# Patient Record
Sex: MALE | Race: WHITE | Employment: UNEMPLOYED | URBAN - METROPOLITAN AREA
[De-identification: names, ages, dates, MRNs, and addresses within clinical notes are randomized per-mention and may not be internally consistent; named-entity substitution may affect disease eponyms.]

---

## 2017-01-11 DIAGNOSIS — Z94.0 S/P KIDNEY TRANSPLANT: Primary | ICD-10-CM

## 2017-01-11 DIAGNOSIS — Z94.83 PANCREAS REPLACED BY TRANSPLANT (H): ICD-10-CM

## 2017-01-11 DIAGNOSIS — Z94.0 KIDNEY REPLACED BY TRANSPLANT: Primary | ICD-10-CM

## 2017-01-11 RX ORDER — SODIUM BICARBONATE 650 MG/1
TABLET ORAL
Qty: 90 TABLET | Refills: 0 | Status: SHIPPED | OUTPATIENT
Start: 2017-01-11 | End: 2017-01-24

## 2017-01-11 RX ORDER — TACROLIMUS 0.5 MG/1
CAPSULE, GELATIN COATED ORAL
Qty: 60 CAPSULE | Refills: 0 | Status: SHIPPED | OUTPATIENT
Start: 2017-01-11 | End: 2017-01-16

## 2017-01-11 RX ORDER — MYCOPHENOLATE MOFETIL 500 MG/1
TABLET, FILM COATED ORAL
Qty: 60 TABLET | Refills: 0 | Status: SHIPPED | OUTPATIENT
Start: 2017-01-11 | End: 2017-01-16

## 2017-01-11 RX ORDER — TACROLIMUS 1 MG/1
1 CAPSULE, GELATIN COATED ORAL 2 TIMES DAILY
Qty: 60 CAPSULE | Refills: 0 | Status: SHIPPED | OUTPATIENT
Start: 2017-01-11 | End: 2017-01-16

## 2017-01-11 NOTE — Clinical Note
January 11, 2017        Dear Jose Alfredo Tomlinson,      This letter is regarding your medication refills.    For the best outcome for your transplant and in order for us to refill your prescriptions, we encourage you to have a yearly clinic appointment with a physician and to have current labs. If you are unable to return to our transplant center there are several alternatives. Please call your coordinator to discuss them. .  To make an appointment with a physician here at [facility], please call:  The Transplant Office at [phone] or [phone].  .      The Transplant Staff at [facility]

## 2017-01-12 NOTE — TELEPHONE ENCOUNTER
aspirin 81 MG      Last Written Prescription Date:  12/23/15  Last Fill Quantity: 100,   # refills: 3  Last Office Visit : 7/26/16  Future Office visit:  none

## 2017-01-16 ENCOUNTER — TELEPHONE (OUTPATIENT)
Dept: TRANSPLANT | Facility: CLINIC | Age: 57
End: 2017-01-16

## 2017-01-16 DIAGNOSIS — Z94.83 PANCREAS REPLACED BY TRANSPLANT (H): ICD-10-CM

## 2017-01-16 DIAGNOSIS — Z94.0 KIDNEY REPLACED BY TRANSPLANT: Primary | ICD-10-CM

## 2017-01-16 RX ORDER — TACROLIMUS 0.5 MG/1
0.5 CAPSULE, GELATIN COATED ORAL 2 TIMES DAILY
Qty: 60 CAPSULE | Refills: 0 | Status: SHIPPED | OUTPATIENT
Start: 2017-01-16 | End: 2017-01-24

## 2017-01-16 RX ORDER — SULFAMETHOXAZOLE AND TRIMETHOPRIM 400; 80 MG/1; MG/1
1 TABLET ORAL
Qty: 10 TABLET | Refills: 0 | Status: SHIPPED | OUTPATIENT
Start: 2017-01-16 | End: 2017-01-24

## 2017-01-16 RX ORDER — MYCOPHENOLATE MOFETIL 500 MG/1
500 TABLET, FILM COATED ORAL 2 TIMES DAILY
Qty: 60 TABLET | Refills: 0 | Status: SHIPPED | OUTPATIENT
Start: 2017-01-16 | End: 2017-01-24

## 2017-01-16 RX ORDER — TACROLIMUS 1 MG/1
1 CAPSULE, GELATIN COATED ORAL 2 TIMES DAILY
Qty: 60 CAPSULE | Refills: 0 | Status: SHIPPED | OUTPATIENT
Start: 2017-01-16 | End: 2017-01-24

## 2017-01-16 NOTE — TELEPHONE ENCOUNTER
I called Chang to find out where he has been having labs drawn as I called [facility] and they said they have nothing on him since 8/2014. He insists that [facility] is where he goes and is concerned that we have not received labs on him since June and no-one from here has notified him or inquired, please give him a call at [phone]

## 2017-01-16 NOTE — Clinical Note
OUTPATIENT LABORATORY TEST ORDER:  After First Year    Patient Name: Jose Alfredo Tomlinson                           Transplant Date: 10-  YOB: 1960                                                 Issue Date and time: 1/16/2017 9:24 AM  [facility] MR: [id number]                                               Exp. Date (1 year after date issued)    Diagnoses:   Kidney Transplant (ICD-10  Z94.0)      Pancreas Transplant (ICD-10  Z94.83)     Long term use of medications (ICD-10  Z79.899)             Lab results to be available on the same day drawn.   Please release results to patient upon their request    Please fax to the Transplant Center at [phone].  Monthly   Complete Blood Count with Platelets    Basic Metabolic Panel (Sodium, Potassium, Chloride, CO2, Creatinine, Urea Nitrogen, Glucose, Calcium)   Amylase, Lipase   Timed Urine Amylase: patient will bring in timed urine collection.  Please report total volume and units per hour as well as units per liter to the patient.  Calculate using the following formula:    units x total volume x 1 = units    liter   1000  # of hours  hour   /Tacrolimus/Prograf drug level (mail to [facility] mailers and instructions provided by the pt)     Rapamune drug level (mail to [facility] mailers and instructions provided by the pt)      Every 6 months,     Due :  and             BK PCR Quantitative/Serum               Complete Lipid Panel fasting (Cholesterol, Triglycerides, HDL, LDL)            Glycosylated Hemoglobin (A1c)              Urine for Protein/Creatinine    Yearly      Due:    DSA/PRA  1. Please draw 20ml of blood in red top (plain) tube for Antileukocyte Antibody (ALA / PRA).  2. Label tubes with the patient s name, complete lab slip.    3. Mailers, lab slips with instructions are sent to patient separately.  4. Call the Outreach Lab at [phone] to reorder mailers.  5. Mail blood to (this address is also on the  mailers):  IMMUNOLOGY LABORATORY  [facility]  [address] [facility] [address]    [address] [place]  [address]  If you have any questions, please call The Transplant Center at [phone] or [phone].    .

## 2017-01-16 NOTE — Clinical Note
OUTPATIENT LABORATORY TEST ORDER:  After First Year    Patient Name: Jose Alfredo Tomlinson                           Transplant Date: 10-  YOB: 1960                                                 Issue Date and time: 1/16/2017 9:24 AM  [facility] MR: [id number]                                               Exp. Date (1 year after date issued)    Diagnoses:   Kidney Transplant (ICD-10  Z94.0)      Pancreas Transplant (ICD-10  Z94.83)     Long term use of medications (ICD-10  Z79.899)             Lab results to be available on the same day drawn.   Please release results to patient upon their request    Please fax to the Transplant Center at [phone].  Monthly   Complete Blood Count with Platelets    Basic Metabolic Panel (Sodium, Potassium, Chloride, CO2, Creatinine, Urea Nitrogen, Glucose, Calcium)   Amylase, Lipase   Timed Urine Amylase: patient will bring in timed urine collection.  Please report total volume and units per hour as well as units per liter to the patient.  Calculate using the following formula:    units x total volume x 1 = units    liter   1000  # of hours  hour   /Tacrolimus/Prograf drug level (mail to [facility] mailers and instructions provided by the pt)     Rapamune drug level (mail to [facility] mailers and instructions provided by the pt)      Every 6 months,     Due :  and             BK PCR Quantitative/Serum               Complete Lipid Panel fasting (Cholesterol, Triglycerides, HDL, LDL)            Glycosylated Hemoglobin (A1c)              Urine for Protein/Creatinine    Yearly      Due:    DSA/PRA  1. Please draw 20ml of blood in red top (plain) tube for Antileukocyte Antibody (ALA / PRA).  2. Label tubes with the patient s name, complete lab slip.    3. Mailers, lab slips with instructions are sent to patient separately.  4. Call the Outreach Lab at [phone] to reorder mailers.  5. Mail blood to (this address is also on the  mailers):  IMMUNOLOGY LABORATORY  [facility]  [address] [facility] [address]    [address] [place]  [address]  If you have any questions, please call The Transplant Center at [phone] or [phone].      .

## 2017-01-17 DIAGNOSIS — Z94.83 PANCREAS REPLACED BY TRANSPLANT (H): Primary | ICD-10-CM

## 2017-01-17 DIAGNOSIS — Z94.0 KIDNEY REPLACED BY TRANSPLANT: ICD-10-CM

## 2017-01-18 NOTE — TELEPHONE ENCOUNTER
"Per pt, [facility] did his lab work under the wrong pt \"they messed up my file.\" Pt is going to get labs at [facility] next week.   "

## 2017-01-24 ENCOUNTER — RESULTS ONLY (OUTPATIENT)
Dept: OTHER | Facility: CLINIC | Age: 57
End: 2017-01-24

## 2017-01-24 ENCOUNTER — OFFICE VISIT (OUTPATIENT)
Dept: NEPHROLOGY | Facility: CLINIC | Age: 57
End: 2017-01-24
Attending: INTERNAL MEDICINE
Payer: MEDICARE

## 2017-01-24 VITALS
DIASTOLIC BLOOD PRESSURE: 75 MMHG | OXYGEN SATURATION: 95 % | BODY MASS INDEX: 24.98 KG/M2 | HEART RATE: 81 BPM | SYSTOLIC BLOOD PRESSURE: 149 MMHG | WEIGHT: 184.4 LBS | TEMPERATURE: 98 F | HEIGHT: 72 IN

## 2017-01-24 DIAGNOSIS — Z94.83 PANCREAS REPLACED BY TRANSPLANT (H): ICD-10-CM

## 2017-01-24 DIAGNOSIS — Z94.0 KIDNEY REPLACED BY TRANSPLANT: Primary | ICD-10-CM

## 2017-01-24 DIAGNOSIS — I10 BENIGN ESSENTIAL HYPERTENSION: ICD-10-CM

## 2017-01-24 DIAGNOSIS — G62.9 PERIPHERAL POLYNEUROPATHY: ICD-10-CM

## 2017-01-24 DIAGNOSIS — Z94.0 KIDNEY REPLACED BY TRANSPLANT: ICD-10-CM

## 2017-01-24 DIAGNOSIS — R11.15 NON-INTRACTABLE CYCLICAL VOMITING WITH NAUSEA: ICD-10-CM

## 2017-01-24 LAB
AMYLASE SERPL-CCNC: 48 U/L (ref 30–110)
ANION GAP SERPL CALCULATED.3IONS-SCNC: 7 MMOL/L (ref 3–14)
BUN SERPL-MCNC: 24 MG/DL (ref 7–30)
CALCIUM SERPL-MCNC: 8.5 MG/DL (ref 8.5–10.1)
CHLORIDE SERPL-SCNC: 113 MMOL/L (ref 94–109)
CO2 SERPL-SCNC: 21 MMOL/L (ref 20–32)
CREAT SERPL-MCNC: 0.93 MG/DL (ref 0.66–1.25)
ERYTHROCYTE [DISTWIDTH] IN BLOOD BY AUTOMATED COUNT: 13.1 % (ref 10–15)
GFR SERPL CREATININE-BSD FRML MDRD: 84 ML/MIN/1.7M2
GLUCOSE SERPL-MCNC: 98 MG/DL (ref 70–99)
HCT VFR BLD AUTO: 38.3 % (ref 40–53)
HGB BLD-MCNC: 12.4 G/DL (ref 13.3–17.7)
LIPASE SERPL-CCNC: 132 U/L (ref 73–393)
MCH RBC QN AUTO: 28.6 PG (ref 26.5–33)
MCHC RBC AUTO-ENTMCNC: 32.4 G/DL (ref 31.5–36.5)
MCV RBC AUTO: 88 FL (ref 78–100)
PLATELET # BLD AUTO: 131 10E9/L (ref 150–450)
POTASSIUM SERPL-SCNC: 4.6 MMOL/L (ref 3.4–5.3)
RBC # BLD AUTO: 4.34 10E12/L (ref 4.4–5.9)
SODIUM SERPL-SCNC: 141 MMOL/L (ref 133–144)
WBC # BLD AUTO: 13.6 10E9/L (ref 4–11)

## 2017-01-24 PROCEDURE — 36415 COLL VENOUS BLD VENIPUNCTURE: CPT | Performed by: INTERNAL MEDICINE

## 2017-01-24 PROCEDURE — 86832 HLA CLASS I HIGH DEFIN QUAL: CPT | Performed by: INTERNAL MEDICINE

## 2017-01-24 PROCEDURE — 80048 BASIC METABOLIC PNL TOTAL CA: CPT | Performed by: INTERNAL MEDICINE

## 2017-01-24 PROCEDURE — 85027 COMPLETE CBC AUTOMATED: CPT | Performed by: INTERNAL MEDICINE

## 2017-01-24 PROCEDURE — 86833 HLA CLASS II HIGH DEFIN QUAL: CPT | Performed by: INTERNAL MEDICINE

## 2017-01-24 PROCEDURE — 82150 ASSAY OF AMYLASE: CPT | Performed by: INTERNAL MEDICINE

## 2017-01-24 PROCEDURE — 99212 OFFICE O/P EST SF 10 MIN: CPT | Mod: ZF

## 2017-01-24 PROCEDURE — 83690 ASSAY OF LIPASE: CPT | Performed by: INTERNAL MEDICINE

## 2017-01-24 RX ORDER — TACROLIMUS 0.5 MG/1
0.5 CAPSULE, GELATIN COATED ORAL 2 TIMES DAILY
Qty: 60 CAPSULE | Refills: 11 | Status: SHIPPED | OUTPATIENT
Start: 2017-01-24 | End: 2018-01-09

## 2017-01-24 RX ORDER — SULFAMETHOXAZOLE AND TRIMETHOPRIM 400; 80 MG/1; MG/1
1 TABLET ORAL
Qty: 10 TABLET | Refills: 11 | Status: SHIPPED | OUTPATIENT
Start: 2017-01-26 | End: 2018-02-05

## 2017-01-24 RX ORDER — SODIUM BICARBONATE 650 MG/1
TABLET ORAL
Qty: 90 TABLET | Refills: 11 | Status: SHIPPED | OUTPATIENT
Start: 2017-01-24 | End: 2018-01-09

## 2017-01-24 RX ORDER — AMLODIPINE BESYLATE 2.5 MG/1
2.5 TABLET ORAL DAILY
Qty: 90 TABLET | Refills: 3 | Status: SHIPPED | OUTPATIENT
Start: 2017-01-24 | End: 2018-02-05

## 2017-01-24 RX ORDER — TACROLIMUS 1 MG/1
1 CAPSULE, GELATIN COATED ORAL 2 TIMES DAILY
Qty: 60 CAPSULE | Refills: 11 | Status: SHIPPED | OUTPATIENT
Start: 2017-01-24 | End: 2018-01-09

## 2017-01-24 RX ORDER — ONDANSETRON 4 MG/1
TABLET, ORALLY DISINTEGRATING ORAL
Qty: 10 TABLET | Refills: 3 | Status: SHIPPED | OUTPATIENT
Start: 2017-01-24 | End: 2017-05-31

## 2017-01-24 RX ORDER — MYCOPHENOLATE MOFETIL 500 MG/1
500 TABLET, FILM COATED ORAL 2 TIMES DAILY
Qty: 60 TABLET | Refills: 11 | Status: SHIPPED | OUTPATIENT
Start: 2017-01-24 | End: 2018-01-09

## 2017-01-24 ASSESSMENT — PAIN SCALES - GENERAL: PAINLEVEL: NO PAIN (0)

## 2017-01-24 NOTE — LETTER
1/24/2017       RE: Jose Alfredo Tomlinson  [address]     [place] [address]     Dear Colleague,    Thank you for referring your patient, Jose Alfredo Tomlinson, to the [facility] NEPHROLOGY at [facility]. Please see a copy of my visit note below.    Assessment and Plan:  1. SPK - with stable functions   2. Immunosuppression management   3. Hypertension controlled   4. Renal Osteodystrophy   5. Opportunistic infection prophylaxis     Continue the current regimen. Encouraged him to get his labs regularly. Continue to follow with PCP and nephrologist   RTC in 1 year or sooner as needed     Assessment and plan was discussed with patient and he voiced his understanding and agreement.    Reason for Visit:  Mr. Tomlinson is here for routine follow up.    HPI:   Jose Alfredo Tomlinson is a 56 year old male with ESKD from T1DM and is status post SPK on 10/29/2007.         Transplant Hx:       Tx: SPK  Date: 10/29/2007       Present Maintenance IS: Tacrolimus and Mycophenolate mofetil       Baseline Creatinine: < 1 mg/dL        Recent DSA: No         Biopsy: No    Mr. Tomlinson is here for routine follow up. He is s/p SPK. Remains off insulin. No specific complaints.   He follows up with his PCP     Home BP: Controlled.      ROS:   A comprehensive review of systems was obtained and negative, except as noted in the HPI or PMH.    Active Medical Problems:  Patient Active Problem List   Diagnosis     Pancreas transplanted (H)     S/P kidney transplant     History of diabetes mellitus, type I     Hyperlipidemia     Peripheral neuropathy (H)     PAD (peripheral artery disease) (H)     Skin exam, screening for cancer     Seborrheic keratosis     Acquired perforating dermatosis     Cellulitis     Atrial bigeminy       Personal Hx:  Social History     Social History     Marital Status:      Spouse Name: N/A     Number of Children: N/A     Years of Education: N/A     Occupational History      Not on file.     Social History Main Topics     Smoking status: Never Smoker      Smokeless tobacco: Never Used     Alcohol Use: No     Drug Use: No     Sexual Activity:     Partners: Female     Other Topics Concern     Not on file     Social History Narrative    Lives alone        Allergies:  No Known Allergies    Medications:  Prior to Admission medications    Medication Sig Start Date End Date Taking? Authorizing Provider   PROGRAF 1 MG PO CAPSULE Take 1 capsule (1 mg) by mouth 2 times daily - total dose 1.5mg twice per day. 1/24/17  Yes Bryn Davidson MD   PROGRAF 0.5 MG PO CAPSULE Take 1 capsule (0.5 mg) by mouth 2 times daily Total dose = 1.5 mg BID 1/24/17  Yes Bryn Davidson MD   CELLCEPT 500 MG PO TABLET Take 1 tablet (500 mg) by mouth 2 times daily 1/24/17  Yes Bryn Davidson MD   sulfamethoxazole-trimethoprim (BACTRIM) 400-80 MG per tablet Take 1 tablet by mouth twice a week 1/26/17  Yes Bryn Davidson MD   sodium bicarbonate 650 MG tablet TAKE ONE TABLET BY MOUTH THREE TIMES A DAY 1/24/17  Yes Bryn Davidson MD   ondansetron (ZOFRAN-ODT) 4 MG ODT tab DISSOLVE ONE TABLET ON TONGUE EVERY 8 HOURS AS NEEDED FOR NAUSEA 1/24/17  Yes Bryn Davidson MD   amLODIPine (NORVASC) 2.5 MG tablet Take 1 tablet (2.5 mg) by mouth daily 1/24/17  Yes Bryn Davidson MD   aspirin 81 MG EC tablet Take 1 tablet (81 mg) by mouth daily 1/12/17  Yes Rosio Salas MD   Cholecalciferol 400 UNITS CAPS Take 1 capsule by mouth daily 12/12/16  Yes Rosio Salas MD   multivitamin, therapeutic with minerals (MULTI-VITAMIN) TABS tablet Take 1 tablet by mouth daily 12/12/16  Yes Rosio Salas MD   ascorbic acid (VITAMIN C) 500 MG tablet Take 2 tablets (1,000 mg) by mouth daily 10/17/16  Yes Rosio Salas MD   omeprazole (PRILOSEC) 20 MG capsule Take 1 capsule (20 mg) by mouth 2 times daily Annual [facility] APPT. DUE JAN 2017 9/19/16  Yes Logeais, Rosio Curtis MD   order for DME  Equipment being ordered: therapeutic shoes and insoles. For Peripheral neuropathy, diabetes type 1 and poor circulation 8/16/16  Yes Rosio Salas MD   simvastatin (ZOCOR) 20 MG tablet Take 1 tablet (20 mg) by mouth At Bedtime 5/26/16  Yes Rosio Salas MD   aspirin 81 MG tablet Take 1 tablet (81 mg) by mouth daily 12/23/15  Yes Rosio Salas MD   sildenafil (VIAGRA) 100 MG tablet Take 0.5-1 tablets ( mg) by mouth daily as needed for erectile dysfunction Take 30 min to 4 hours before intercourse.  Never use with nitroglycerin, terazosin or doxazosin. 2/17/15  Yes Rosio Salas MD       Vitals:  /75 mmHg  Pulse 81  Temp(Src) 98  F (36.7  C) (Oral)  Ht 1.829 m (6')  Wt 83.643 kg (184 lb 6.4 oz)  BMI 25.00 kg/m2  SpO2 95%    Exam:   GENERAL APPEARANCE: alert and no distress  HENT: mouth without ulcers or lesions  LYMPHATICS: no cervical or supraclavicular nodes  RESP: lungs clear to auscultation - no rales, rhonchi or wheezes  CV: regular rhythm, normal rate, no rub, no murmur  EDEMA: no LE edema bilaterally  ABDOMEN: soft, nondistended, nontender, bowel sounds normal  MS: extremities normal - no gross deformities noted, no evidence of inflammation in joints, no muscle tenderness  SKIN: no rash    Results:   Reviewed         Again, thank you for allowing me to participate in the care of your patient.      Sincerely,    Bryn Davidson MD

## 2017-01-24 NOTE — NURSING NOTE
Chief Complaint   Patient presents with     RECHECK     FOLLOW UP KIDNEY TRANSPLANT       Initial /75 mmHg  Pulse 81  Temp(Src) 98  F (36.7  C) (Oral)  Ht 1.829 m (6')  Wt 83.643 kg (184 lb 6.4 oz)  BMI 25.00 kg/m2  SpO2 95% Estimated body mass index is 25 kg/(m^2) as calculated from the following:    Height as of this encounter: 1.829 m (6').    Weight as of this encounter: 83.643 kg (184 lb 6.4 oz).  BP completed using cuff size: ema HEREDIA CMA

## 2017-01-24 NOTE — MR AVS SNAPSHOT
After Visit Summary   1/24/2017    Jose Alfredo Tomlinson    MRN: [id number]           Patient Information     Date Of Birth          1960        Visit Information        Provider Department      1/24/2017 3:40 PM Bryn Davidson MD [facility] Nephrology        Today's Diagnoses     Kidney replaced by transplant    -  1     Pancreas replaced by transplant (H)         Non-intractable cyclical vomiting with nausea         Benign essential hypertension            Follow-ups after your visit        Your next 10 appointments already scheduled     Jan 24, 2017  3:40 PM   (Arrive by 3:10 PM)   Return Kidney Transplant with Bryn Davidson MD   [facility] Nephrology ([facility])    [address] [place] [address] [id number] [phone]            Jul 25, 2017  1:35 PM   (Arrive by 1:05 PM)   Return Kidney Transplant with Bryn Davidson MD   [facility] Nephrology ([facility])    [address] [place] [address] [id number] [phone]              Who to contact     If you have questions or need follow up information about today's clinic visit or your schedule please contact [facility] NEPHROLOGY directly at [phone].  Normal or non-critical lab and imaging results will be communicated to you by MyChart, letter or phone within 4 business days after the clinic has received the results. If you do not hear from us within 7 days, please contact the clinic through MyChart or phone. If you have a critical or abnormal lab result, we will notify you by phone as soon as possible.  Submit refill requests through [employer] or call your pharmacy and they will forward the refill request to us. Please allow 3 business days for your refill to be completed.          Additional Information About Your Visit        [employer] Information     [employer] gives you secure access to your electronic health record. If you see a primary care provider, you can  also send messages to your care team and make appointments. If you have questions, please call your primary care clinic.  If you do not have a primary care provider, please call [phone] and they will assist you.        Care EveryWhere ID     This is your Care EveryWhere ID. This could be used by other organizations to access your [facility] medical records  [id number]        Your Vitals Were     Pulse Temperature Height BMI (Body Mass Index) Pulse Oximetry       81 98  F (36.7  C) (Oral) 1.829 m (6') 25.00 kg/m2 95%        Blood Pressure from Last 3 Encounters:   01/24/17 149/75   07/26/16 128/77   06/26/16 110/56    Weight from Last 3 Encounters:   01/24/17 83.643 kg (184 lb 6.4 oz)   07/26/16 90.946 kg (200 lb 8 oz)   06/26/16 86.183 kg (190 lb)              Today, you had the following     No orders found for display         Today's Medication Changes          These changes are accurate as of: 1/24/17  3:17 PM.  If you have any questions, ask your nurse or doctor.               These medicines have changed or have updated prescriptions.        Dose/Directions    ondansetron 4 MG ODT tab   Commonly known as:  ZOFRAN-ODT   This may have changed:  See the new instructions.   Used for:  Non-intractable cyclical vomiting with nausea   Changed by:  Bryn Davidson MD        DISSOLVE ONE TABLET ON TONGUE EVERY 8 HOURS AS NEEDED FOR NAUSEA   Quantity:  10 tablet   Refills:  3       sodium bicarbonate 650 MG tablet   This may have changed:  See the new instructions.   Used for:  Kidney replaced by transplant, Pancreas replaced by transplant (H)   Changed by:  Bryn Davidson MD        TAKE ONE TABLET BY MOUTH THREE TIMES A DAY   Quantity:  90 tablet   Refills:  11            Where to get your medicines      These medications were sent to [facility] - [place], [place] - [address]  [address], [facility] [address]    Hours:  Mon-Fri 8:30am-5:00pm Toll Free  [phone] Phone:  [phone]    - amLODIPine 2.5 MG tablet  - mycophenolate tablet  - ondansetron 4 MG ODT tab  - sodium bicarbonate 650 MG tablet  - sulfamethoxazole-trimethoprim 400-80 MG per tablet  - tacrolimus capsule  - tacrolimus capsule             Primary Care Provider Office Phone # Fax #    Rosio Salas [phone]       [address]  [facility] [address]        Thank you!     Thank you for choosing [facility] NEPHROLOGY  for your care. Our goal is always to provide you with excellent care. Hearing back from our patients is one way we can continue to improve our services. Please take a few minutes to complete the written survey that you may receive in the mail after your visit with us. Thank you!             Your Updated Medication List - Protect others around you: Learn how to safely use, store and throw away your medicines at www.disposemymeds.org.          This list is accurate as of: 1/24/17  3:17 PM.  Always use your most recent med list.                   Brand Name Dispense Instructions for use    amLODIPine 2.5 MG tablet    NORVASC    90 tablet    Take 1 tablet (2.5 mg) by mouth daily       ascorbic acid 500 MG tablet    VITAMIN C    180 tablet    Take 2 tablets (1,000 mg) by mouth daily       * aspirin 81 MG tablet     100 tablet    Take 1 tablet (81 mg) by mouth daily       * aspirin 81 MG EC tablet     90 tablet    Take 1 tablet (81 mg) by mouth daily       Cholecalciferol 400 UNITS Caps     90 capsule    Take 1 capsule by mouth daily       multivitamin, therapeutic with minerals Tabs tablet     100 each    Take 1 tablet by mouth daily       mycophenolate tablet     60 tablet    Take 1 tablet (500 mg) by mouth 2 times daily       omeprazole 20 MG CR capsule    priLOSEC    180 capsule    Take 1 capsule (20 mg) by mouth 2 times daily Annual [facility] APPT. DUE JAN 2017       ondansetron 4 MG ODT tab    ZOFRAN-ODT    10 tablet    DISSOLVE ONE  TABLET ON TONGUE EVERY 8 HOURS AS NEEDED FOR NAUSEA       order for DME     1 Package    Equipment being ordered: therapeutic shoes and insoles. For Peripheral neuropathy, diabetes type 1 and poor circulation       sildenafil 100 MG cap/tab    VIAGRA    2 tablet    Take 0.5-1 tablets ( mg) by mouth daily as needed for erectile dysfunction Take 30 min to 4 hours before intercourse.  Never use with nitroglycerin, terazosin or doxazosin.       simvastatin 20 MG tablet    ZOCOR    90 tablet    Take 1 tablet (20 mg) by mouth At Bedtime       sodium bicarbonate 650 MG tablet     90 tablet    TAKE ONE TABLET BY MOUTH THREE TIMES A DAY       sulfamethoxazole-trimethoprim 400-80 MG per tablet   Start taking on:  1/26/2017    BACTRIM    10 tablet    Take 1 tablet by mouth twice a week       * tacrolimus capsule     60 capsule    Take 1 capsule (1 mg) by mouth 2 times daily - total dose 1.5mg twice per day.       * tacrolimus capsule     60 capsule    Take 1 capsule (0.5 mg) by mouth 2 times daily Total dose = 1.5 mg BID       * Notice:  This list has 4 medication(s) that are the same as other medications prescribed for you. Read the directions carefully, and ask your doctor or other care provider to review them with you.

## 2017-01-25 LAB — PRA DONOR SPECIFIC ABY: NORMAL

## 2017-01-27 LAB
DONOR IDENTIFICATION: NORMAL
DONOR IDENTIFICATION: NORMAL
DSA COMMENTS: NORMAL
DSA COMMENTS: NORMAL
DSA PRESENT: NO
DSA PRESENT: NO
DSA TEST METHOD: NORMAL
DSA TEST METHOD: NORMAL
ORGAN: NORMAL
ORGAN: NORMAL
PROTOCOL CUTOFF: NORMAL
SA1 CELL: NORMAL
SA1 COMMENTS: NORMAL
SA1 HI RISK ABY: NORMAL
SA1 MOD RISK ABY: NORMAL
SA1 TEST METHOD: NORMAL
SA2 CELL: NORMAL
SA2 COMMENTS: NORMAL
SA2 HI RISK ABY UA: NORMAL
SA2 MOD RISK ABY: NORMAL
SA2 TEST METHOD: NORMAL
UNACCEPTABLE ANTIGEN: NORMAL
UNOS CPRA: 4

## 2017-01-30 ENCOUNTER — TELEPHONE (OUTPATIENT)
Dept: TRANSPLANT | Facility: CLINIC | Age: 57
End: 2017-01-30

## 2017-01-30 NOTE — TELEPHONE ENCOUNTER
Pt states you know the issue that is going on with labs and he would like to speak with you so everything is ready for this coming month please call pt

## 2017-01-30 NOTE — TELEPHONE ENCOUNTER
Patient aware [facility] has updated lab order and will get labs in 1 week. Patient asked why transplant office hadn't informed him earlier he was overdue for labs, explained no computer system to inform staff of that and patient can call 1-2 days after labs are drawn to ensure transplant office has received results. Patient agreed.

## 2017-02-02 NOTE — PROGRESS NOTES
Assessment and Plan:  1. SPK - with stable functions   2. Immunosuppression management   3. Hypertension controlled   4. Renal Osteodystrophy   5. Opportunistic infection prophylaxis     Continue the current regimen. Encouraged him to get his labs regularly. Continue to follow with PCP and nephrologist   RTC in 1 year or sooner as needed     Assessment and plan was discussed with patient and he voiced his understanding and agreement.    Reason for Visit:  Mr. Tomlinson is here for routine follow up.    HPI:   Jose Alfredo Tomlinson is a 56 year old male with ESKD from T1DM and is status post SPK on 10/29/2007.         Transplant Hx:       Tx: SPK  Date: 10/29/2007       Present Maintenance IS: Tacrolimus and Mycophenolate mofetil       Baseline Creatinine: < 1 mg/dL        Recent DSA: No         Biopsy: No    Mr. Tomlinson is here for routine follow up. He is s/p SPK. Remains off insulin. No specific complaints.   He follows up with his PCP     Home BP: Controlled.      ROS:   A comprehensive review of systems was obtained and negative, except as noted in the HPI or PMH.    Active Medical Problems:  Patient Active Problem List   Diagnosis     Pancreas transplanted (H)     S/P kidney transplant     History of diabetes mellitus, type I     Hyperlipidemia     Peripheral neuropathy (H)     PAD (peripheral artery disease) (H)     Skin exam, screening for cancer     Seborrheic keratosis     Acquired perforating dermatosis     Cellulitis     Atrial bigeminy       Personal Hx:  Social History     Social History     Marital Status:      Spouse Name: N/A     Number of Children: N/A     Years of Education: N/A     Occupational History     Not on file.     Social History Main Topics     Smoking status: Never Smoker      Smokeless tobacco: Never Used     Alcohol Use: No     Drug Use: No     Sexual Activity:     Partners: Female     Other Topics Concern     Not on file     Social History Narrative    Lives alone         Allergies:  No Known Allergies    Medications:  Prior to Admission medications    Medication Sig Start Date End Date Taking? Authorizing Provider   PROGRAF 1 MG PO CAPSULE Take 1 capsule (1 mg) by mouth 2 times daily - total dose 1.5mg twice per day. 1/24/17  Yes Bryn Davidson MD   PROGRAF 0.5 MG PO CAPSULE Take 1 capsule (0.5 mg) by mouth 2 times daily Total dose = 1.5 mg BID 1/24/17  Yes Bryn Davidson MD   CELLCEPT 500 MG PO TABLET Take 1 tablet (500 mg) by mouth 2 times daily 1/24/17  Yes Bryn Davidson MD   sulfamethoxazole-trimethoprim (BACTRIM) 400-80 MG per tablet Take 1 tablet by mouth twice a week 1/26/17  Yes Bryn Davidson MD   sodium bicarbonate 650 MG tablet TAKE ONE TABLET BY MOUTH THREE TIMES A DAY 1/24/17  Yes Bryn Davidson MD   ondansetron (ZOFRAN-ODT) 4 MG ODT tab DISSOLVE ONE TABLET ON TONGUE EVERY 8 HOURS AS NEEDED FOR NAUSEA 1/24/17  Yes Bryn Davidson MD   amLODIPine (NORVASC) 2.5 MG tablet Take 1 tablet (2.5 mg) by mouth daily 1/24/17  Yes Bryn Davidson MD   aspirin 81 MG EC tablet Take 1 tablet (81 mg) by mouth daily 1/12/17  Yes Rosio Salas MD   Cholecalciferol 400 UNITS CAPS Take 1 capsule by mouth daily 12/12/16  Yes Rosio Salas MD   multivitamin, therapeutic with minerals (MULTI-VITAMIN) TABS tablet Take 1 tablet by mouth daily 12/12/16  Yes Rosio Salas MD   ascorbic acid (VITAMIN C) 500 MG tablet Take 2 tablets (1,000 mg) by mouth daily 10/17/16  Yes Rosio Salas MD   omeprazole (PRILOSEC) 20 MG capsule Take 1 capsule (20 mg) by mouth 2 times daily Annual KRISTIAN WASHINGTON APPT. DUE JAN 2017 9/19/16  Yes Rosio Salas MD   order for DME Equipment being ordered: therapeutic shoes and insoles. For Peripheral neuropathy, diabetes type 1 and poor circulation 8/16/16  Yes Rosio Salas MD   simvastatin (ZOCOR) 20 MG tablet Take 1 tablet (20 mg) by mouth At Bedtime 5/26/16  Yes Rosio Salas MD   aspirin 81  MG tablet Take 1 tablet (81 mg) by mouth daily 12/23/15  Yes Rosio Salas MD   sildenafil (VIAGRA) 100 MG tablet Take 0.5-1 tablets ( mg) by mouth daily as needed for erectile dysfunction Take 30 min to 4 hours before intercourse.  Never use with nitroglycerin, terazosin or doxazosin. 2/17/15  Yes Rosio Salas MD       Vitals:  /75 mmHg  Pulse 81  Temp(Src) 98  F (36.7  C) (Oral)  Ht 1.829 m (6')  Wt 83.643 kg (184 lb 6.4 oz)  BMI 25.00 kg/m2  SpO2 95%    Exam:   GENERAL APPEARANCE: alert and no distress  HENT: mouth without ulcers or lesions  LYMPHATICS: no cervical or supraclavicular nodes  RESP: lungs clear to auscultation - no rales, rhonchi or wheezes  CV: regular rhythm, normal rate, no rub, no murmur  EDEMA: no LE edema bilaterally  ABDOMEN: soft, nondistended, nontender, bowel sounds normal  MS: extremities normal - no gross deformities noted, no evidence of inflammation in joints, no muscle tenderness  SKIN: no rash    Results:   Reviewed

## 2017-02-10 DIAGNOSIS — E78.00 HYPERCHOLESTEREMIA: ICD-10-CM

## 2017-02-13 RX ORDER — SIMVASTATIN 20 MG
TABLET ORAL
Qty: 90 TABLET | Refills: 2 | OUTPATIENT
Start: 2017-02-13

## 2017-02-13 RX ORDER — SIMVASTATIN 20 MG
20 TABLET ORAL AT BEDTIME
Qty: 90 TABLET | Refills: 1 | Status: SHIPPED | OUTPATIENT
Start: 2017-02-13 | End: 2019-02-01

## 2017-02-13 NOTE — TELEPHONE ENCOUNTER
simvastatin (ZOCOR) 20 MG tablet     Last Written Prescription Date: 5-26-16  Last Fill Quantity: 90, # refills: 2  Last Office Visit : 7-26-16  Pending Visit:none    Lab Results   Component Value Date    CHOL 137 05/10/2016     Lab Results   Component Value Date    HDL 49 05/10/2016     Lab Results   Component Value Date    LDL 71 05/10/2016     Lab Results   Component Value Date    TRIG 88 05/10/2016     Lab Results   Component Value Date    CHOLHDLRATIO 2.7 03/03/2015     Kathleen M Doege RN

## 2017-02-18 PROBLEM — I10 BENIGN ESSENTIAL HYPERTENSION: Status: ACTIVE | Noted: 2017-02-18

## 2017-02-18 PROBLEM — Z94.83 PANCREAS REPLACED BY TRANSPLANT (H): Status: ACTIVE | Noted: 2017-02-18

## 2017-02-18 PROBLEM — Z94.0 KIDNEY REPLACED BY TRANSPLANT: Status: ACTIVE | Noted: 2017-02-18

## 2017-03-07 DIAGNOSIS — K21.9 ESOPHAGEAL REFLUX: ICD-10-CM

## 2017-03-09 NOTE — TELEPHONE ENCOUNTER
omeprazole (PRILOSEC) 20 MG      Last Written Prescription Date:  9/19/16  Last Fill Quantity: 180,   # refills: 1  Last Office Visit : 7/26/16  Future Office visit:  NONE

## 2017-04-13 ENCOUNTER — OFFICE VISIT (OUTPATIENT)
Dept: INTERNAL MEDICINE | Facility: CLINIC | Age: 57
End: 2017-04-13

## 2017-04-13 VITALS
HEART RATE: 72 BPM | DIASTOLIC BLOOD PRESSURE: 74 MMHG | RESPIRATION RATE: 16 BRPM | WEIGHT: 189.1 LBS | SYSTOLIC BLOOD PRESSURE: 131 MMHG | BODY MASS INDEX: 25.65 KG/M2

## 2017-04-13 DIAGNOSIS — E10.40 TYPE 1 DIABETES MELLITUS WITH DIABETIC NEUROPATHY (H): Primary | ICD-10-CM

## 2017-04-13 DIAGNOSIS — Z86.31 H/O DIABETIC FOOT ULCER: ICD-10-CM

## 2017-04-13 DIAGNOSIS — M79.89 BILATERAL SWELLING OF FEET: ICD-10-CM

## 2017-04-13 DIAGNOSIS — M21.372 BILATERAL FOOT-DROP: ICD-10-CM

## 2017-04-13 DIAGNOSIS — M79.671 PAIN IN BOTH FEET: ICD-10-CM

## 2017-04-13 DIAGNOSIS — M79.672 PAIN IN BOTH FEET: ICD-10-CM

## 2017-04-13 DIAGNOSIS — M21.371 BILATERAL FOOT-DROP: ICD-10-CM

## 2017-04-13 DIAGNOSIS — R20.0 NUMBNESS IN FEET: ICD-10-CM

## 2017-04-13 ASSESSMENT — PAIN SCALES - GENERAL: PAINLEVEL: NO PAIN (0)

## 2017-04-13 NOTE — NURSING NOTE
Chief Complaint   Patient presents with     Diabetes     patient would like a RX for diabetic shoes     YAYA PRINGLE at 8:06 AM on 4/13/2017.

## 2017-04-13 NOTE — PROGRESS NOTES
"CC:  Needs orthotics/diabetic shoes rx    S:  Here for the above.  Reviewed medical history and indications for special shoes/orthotics.  Also wears AFO's for foot drop and these will be incorporated into the design of the shoes. Though he no longer has \"diabetes\" post pancreas transplantation, he has residual diabetic neuropathy and related pain and numbness. Also, bilateral foot drop. Has had ulcers in the past. We reviewed routine HCM.  Gets labs elsewhere, he will track results down and states that they are up to date.  He is due for a routine eye exam, will rx a referral.  Reviewed any needs for rx refills.    Patient Active Problem List   Diagnosis     Pancreas transplanted (H)     S/P kidney transplant     History of diabetes mellitus, type I     Hyperlipidemia     Peripheral neuropathy (H)     PAD (peripheral artery disease) (H)     Skin exam, screening for cancer     Seborrheic keratosis     Acquired perforating dermatosis     Cellulitis     Atrial bigeminy     Kidney replaced by transplant     Pancreas replaced by transplant (H)     Benign essential hypertension     Current Outpatient Prescriptions   Medication Sig Dispense Refill     simvastatin (ZOCOR) 20 MG tablet Take 1 tablet (20 mg) by mouth At Bedtime 90 tablet 1     PROGRAF 1 MG PO CAPSULE Take 1 capsule (1 mg) by mouth 2 times daily - total dose 1.5mg twice per day. 60 capsule 11     PROGRAF 0.5 MG PO CAPSULE Take 1 capsule (0.5 mg) by mouth 2 times daily Total dose = 1.5 mg BID 60 capsule 11     CELLCEPT 500 MG PO TABLET Take 1 tablet (500 mg) by mouth 2 times daily 60 tablet 11     sulfamethoxazole-trimethoprim (BACTRIM) 400-80 MG per tablet Take 1 tablet by mouth twice a week 10 tablet 11     sodium bicarbonate 650 MG tablet TAKE ONE TABLET BY MOUTH THREE TIMES A DAY 90 tablet 11     ondansetron (ZOFRAN-ODT) 4 MG ODT tab DISSOLVE ONE TABLET ON TONGUE EVERY 8 HOURS AS NEEDED FOR NAUSEA 10 tablet 3     amLODIPine (NORVASC) 2.5 MG tablet Take 1 " tablet (2.5 mg) by mouth daily 90 tablet 3     aspirin 81 MG EC tablet Take 1 tablet (81 mg) by mouth daily 90 tablet 3     Cholecalciferol 400 UNITS CAPS Take 1 capsule by mouth daily 90 capsule 3     multivitamin, therapeutic with minerals (MULTI-VITAMIN) TABS tablet Take 1 tablet by mouth daily 100 each 3     ascorbic acid (VITAMIN C) 500 MG tablet Take 2 tablets (1,000 mg) by mouth daily 180 tablet 3     omeprazole (PRILOSEC) 20 MG capsule Take 1 capsule (20 mg) by mouth 2 times daily Annual LOGEAIS MD APPT. DUE JAN 2017 180 capsule 1     order for DME Equipment being ordered: therapeutic shoes and insoles. For Peripheral neuropathy, diabetes type 1 and poor circulation 1 Package 1     aspirin 81 MG tablet Take 1 tablet (81 mg) by mouth daily 100 tablet 3     No Known Allergies  /74  Pulse 72  Resp 16  Wt 85.8 kg (189 lb 1.6 oz)  BMI 25.65 kg/m2   Monofilament and vibratory sensation absent to level of knees bilaterally  DP pulses 2+  No evidence of tinea pedis, no ulcers    Jose Alfredo was seen today for diabetes.    Diagnoses and all orders for this visit:    Type 1 diabetes mellitus with diabetic neuropathy (H)  -     OPHTHALMOLOGY ADULT REFERRAL  -     ORTHO SHOE CUSTOM SHOES    Pain in both feet  -     ORTHO SHOE CUSTOM SHOES    Numbness in feet  -     ORTHO SHOE CUSTOM SHOES    H/O diabetic foot ulcer  -     ORTHO SHOE CUSTOM SHOES    Bilateral foot-drop  -     ORTHO SHOE CUSTOM SHOES    Bilateral swelling of feet  -     ORTHO SHOE CUSTOM SHOES    Total time spent 15 minutes and 25 minutes.  More than 50% of the time spent with Mr. Tomlinson on counseling / coordinating his care          Barbra Joseph M.D.  Internal Medicine  [facility]   pager [phone]

## 2017-04-13 NOTE — MR AVS SNAPSHOT
After Visit Summary   4/13/2017    Jose Alfredo Tomlinson    MRN: [id number]           Patient Information     Date Of Birth          1960        Visit Information        Provider Department      4/13/2017 8:00 AM Barbra Joseph MD [facility]        Today's Diagnoses     Type 1 diabetes mellitus with diabetic neuropathy (H)    -  1    Pain in both feet        Numbness in feet        H/O diabetic foot ulcer        Bilateral foot-drop        Bilateral swelling of feet          Care Instructions    [facility] Medication Refill Request Information:  * Please contact your pharmacy regarding ANY request for medication refills.  ** [facility] Prescription Fax = [phone]  * Please allow 3 business days for routine medication refills.  * Please allow 5 business days for controlled substance medication refills.     [facility] Test Result notification information:  *You will be notified with in 7-10 days of your appointment day regarding the results of your test.  If you are on MyChart you will be notified as soon as the provider has reviewed the results and signed off on them.          Follow-ups after your visit        Additional Services     OPHTHALMOLOGY ADULT REFERRAL       Your provider has referred you to: [facility]    Please be aware that coverage of these services is subject to the terms and limitations of your health insurance plan.  Call member services at your health plan with any benefit or coverage questions.      Please bring the following with you to your appointment:    (1) Any X-Rays, CTs or MRIs which have been performed.  Contact the facility where they were done to arrange for  prior to your scheduled appointment.    (2) List of current medications  (3) This referral request   (4) Any documents/labs given to you for this referral                  Your next 10 appointments already scheduled     Jul 25, 2017  1:35 PM CDT   (Arrive by 1:05 PM)    Return Kidney Transplant with Bryn Davidson MD   [facility] ([facility])    [address] [address] [phone]              Who to contact     Please call your clinic at [phone] to:    Ask questions about your health    Make or cancel appointments    Discuss your medicines    Learn about your test results    Speak to your doctor   If you have compliments or concerns about an experience at your clinic, or if you wish to file a complaint, please contact [facility] Patient Relations at [phone] or email us at [email or web address]         Additional Information About Your Visit        [employer] Information     [employer] gives you secure access to your electronic health record. If you see a primary care provider, you can also send messages to your care team and make appointments. If you have questions, please call your primary care clinic.  If you do not have a primary care provider, please call [phone] and they will assist you.      [employer] is an electronic gateway that provides easy, online access to your medical records. With [employer], you can request a clinic appointment, read your test results, renew a prescription or communicate with your care team.     To access your existing account, please contact your [facility] Clinic or call [phone] for assistance.        Care EveryWhere ID     This is your Care EveryWhere ID. This could be used by other organizations to access your [facility] medical records  [id number]        Your Vitals Were     Pulse Respirations BMI (Body Mass Index)             72 16 25.65 kg/m2          Blood Pressure from Last 3 Encounters:   04/13/17 131/74   01/24/17 149/75   07/26/16 128/77    Weight from Last 3 Encounters:   04/13/17 85.8 kg (189 lb 1.6 oz)   01/24/17 83.6 kg (184 lb 6.4 oz)   07/26/16 90.9 kg (200 lb 8 oz)              We  Performed the Following     OPHTHALMOLOGY ADULT REFERRAL     ORTHO SHOE CUSTOM SHOES          Today's Medication Changes          These changes are accurate as of: 4/13/17  9:01 AM.  If you have any questions, ask your nurse or doctor.               These medicines have changed or have updated prescriptions.        Dose/Directions    omeprazole 20 MG CR capsule   Commonly known as:  priLOSEC   This may have changed:  Another medication with the same name was removed. Continue taking this medication, and follow the directions you see here.   Used for:  Esophageal reflux   Changed by:  Rosio Salas MD        Dose:  20 mg   Take 1 capsule (20 mg) by mouth 2 times daily Annual KRISTIAN WASHINGTON APPT. DUE JAN 2017   Quantity:  180 capsule   Refills:  1         Stop taking these medicines if you haven't already. Please contact your care team if you have questions.     sildenafil 100 MG cap/tab   Commonly known as:  VIAGRA   Stopped by:  Barbra Joseph MD                    Primary Care Provider Office Phone # Fax #    Rosio Salas [phone]       [address] [address]        Thank you!     Thank you for choosing [facility]  for your care. Our goal is always to provide you with excellent care. Hearing back from our patients is one way we can continue to improve our services. Please take a few minutes to complete the written survey that you may receive in the mail after your visit with us. Thank you!             Your Updated Medication List - Protect others around you: Learn how to safely use, store and throw away your medicines at www.disposemymeds.org.          This list is accurate as of: 4/13/17  9:01 AM.  Always use your most recent med list.                   Brand Name Dispense Instructions for use    amLODIPine 2.5 MG tablet    NORVASC    90 tablet    Take 1 tablet (2.5 mg) by mouth daily       ascorbic acid 500 MG tablet     VITAMIN C    180 tablet    Take 2 tablets (1,000 mg) by mouth daily       * aspirin 81 MG tablet     100 tablet    Take 1 tablet (81 mg) by mouth daily       * aspirin 81 MG EC tablet     90 tablet    Take 1 tablet (81 mg) by mouth daily       Cholecalciferol 400 UNITS Caps     90 capsule    Take 1 capsule by mouth daily       multivitamin, therapeutic with minerals Tabs tablet     100 each    Take 1 tablet by mouth daily       mycophenolate tablet     60 tablet    Take 1 tablet (500 mg) by mouth 2 times daily       omeprazole 20 MG CR capsule    priLOSEC    180 capsule    Take 1 capsule (20 mg) by mouth 2 times daily Annual KRISTIAN WASHINGTON APPT. DUE JAN 2017       ondansetron 4 MG ODT tab    ZOFRAN-ODT    10 tablet    DISSOLVE ONE TABLET ON TONGUE EVERY 8 HOURS AS NEEDED FOR NAUSEA       order for DME     1 Package    Equipment being ordered: therapeutic shoes and insoles. For Peripheral neuropathy, diabetes type 1 and poor circulation       simvastatin 20 MG tablet    ZOCOR    90 tablet    Take 1 tablet (20 mg) by mouth At Bedtime       sodium bicarbonate 650 MG tablet     90 tablet    TAKE ONE TABLET BY MOUTH THREE TIMES A DAY       sulfamethoxazole-trimethoprim 400-80 MG per tablet    BACTRIM    10 tablet    Take 1 tablet by mouth twice a week       * tacrolimus capsule     60 capsule    Take 1 capsule (1 mg) by mouth 2 times daily - total dose 1.5mg twice per day.       * tacrolimus capsule     60 capsule    Take 1 capsule (0.5 mg) by mouth 2 times daily Total dose = 1.5 mg BID       * Notice:  This list has 4 medication(s) that are the same as other medications prescribed for you. Read the directions carefully, and ask your doctor or other care provider to review them with you.

## 2017-04-13 NOTE — PATIENT INSTRUCTIONS
Primary Care Center Medication Refill Request Information:  * Please contact your pharmacy regarding ANY request for medication refills.  ** [facility] Prescription Fax = [phone]  * Please allow 3 business days for routine medication refills.  * Please allow 5 business days for controlled substance medication refills.     [facility] Test Result notification information:  *You will be notified with in 7-10 days of your appointment day regarding the results of your test.  If you are on MyChart you will be notified as soon as the provider has reviewed the results and signed off on them.

## 2017-04-24 ENCOUNTER — TELEPHONE (OUTPATIENT)
Dept: INTERNAL MEDICINE | Facility: CLINIC | Age: 57
End: 2017-04-24

## 2017-04-24 NOTE — TELEPHONE ENCOUNTER
----- Message from Maye Felder sent at 4/22/2017 11:22 AM CDT -----  Regarding: PT REQUESTING CB  Contact: [phone]  Call from PT in regards to a recorded call he received telling him to call [facility].  When he called, he was on hold for a half hour so decided to call back to us to try to find out why.  I don't see any notes regarding a phone call so I didn't have an answer for him.  Just as an FYI, he was very frustrated with the fact that he received a phone call on a Saturday and then there was no one to help.  The best I could do was tell him I would send a message for a return call on Monday.  If there is info I missed, please let me know.  PT can be reached at [phone].    Message lleft with pt that this writer was unable to locate in his chart a phone call was sent out to him on 02/22/2017.  Betty Wiggins RN 9:40 AM on 4/24/2017.

## 2017-05-25 ENCOUNTER — MEDICAL CORRESPONDENCE (OUTPATIENT)
Dept: HEALTH INFORMATION MANAGEMENT | Facility: CLINIC | Age: 57
End: 2017-05-25

## 2017-05-31 DIAGNOSIS — R11.15 NON-INTRACTABLE CYCLICAL VOMITING WITH NAUSEA: ICD-10-CM

## 2017-05-31 DIAGNOSIS — K21.9 ESOPHAGEAL REFLUX: ICD-10-CM

## 2017-05-31 RX ORDER — ONDANSETRON 4 MG/1
TABLET, ORALLY DISINTEGRATING ORAL
Qty: 10 TABLET | Refills: 3 | Status: SHIPPED | OUTPATIENT
Start: 2017-05-31 | End: 2017-09-19

## 2017-05-31 NOTE — TELEPHONE ENCOUNTER
Drug Name: ondansetron 4mg odt  Last Fill Date: 5/8/17  Quantity: 10    Ruba Luong   [facility]  [phone]

## 2017-06-01 NOTE — TELEPHONE ENCOUNTER
Omeprazole      Last Written Prescription Date:  9/19/16  Last Fill Quantity: 180,   # refills: 1  Last Office Visit : 4/13/17  Future Office visit:  none

## 2017-06-20 ENCOUNTER — MEDICAL CORRESPONDENCE (OUTPATIENT)
Dept: HEALTH INFORMATION MANAGEMENT | Facility: CLINIC | Age: 57
End: 2017-06-20

## 2017-06-22 ENCOUNTER — MEDICAL CORRESPONDENCE (OUTPATIENT)
Dept: HEALTH INFORMATION MANAGEMENT | Facility: CLINIC | Age: 57
End: 2017-06-22

## 2017-07-25 DIAGNOSIS — E78.00 HYPERCHOLESTEREMIA: ICD-10-CM

## 2017-07-26 RX ORDER — SIMVASTATIN 20 MG
20 TABLET ORAL AT BEDTIME
Qty: 90 TABLET | Refills: 2 | Status: SHIPPED | OUTPATIENT
Start: 2017-07-26 | End: 2019-02-23

## 2017-07-26 NOTE — TELEPHONE ENCOUNTER
simvastatin (ZOCOR) 20 MG tablet      Last Written Prescription Date:  2-13-17  Last Fill Quantity: 90,   # refills: 1  Last Office Visit : 4-13-17  Future Office visit:  None      Kathleen M Doege RN

## 2017-09-19 DIAGNOSIS — R11.15 NON-INTRACTABLE CYCLICAL VOMITING WITH NAUSEA: ICD-10-CM

## 2017-09-19 DIAGNOSIS — Z94.0 S/P KIDNEY TRANSPLANT: ICD-10-CM

## 2017-09-19 DIAGNOSIS — G62.9 PERIPHERAL NEUROPATHY: ICD-10-CM

## 2017-09-19 DIAGNOSIS — Z94.83 PANCREAS TRANSPLANTED (H): ICD-10-CM

## 2017-09-21 RX ORDER — ONDANSETRON 4 MG/1
TABLET, ORALLY DISINTEGRATING ORAL
Qty: 10 TABLET | Refills: 3 | Status: SHIPPED | OUTPATIENT
Start: 2017-09-21 | End: 2018-01-09

## 2017-09-21 RX ORDER — ASCORBIC ACID 500 MG
1000 TABLET ORAL DAILY
Qty: 180 TABLET | Refills: 3 | Status: SHIPPED | OUTPATIENT
Start: 2017-09-21 | End: 2019-02-23

## 2017-09-21 NOTE — TELEPHONE ENCOUNTER
VITAMIN C 500MG      Last Written Prescription Date:  10/17/16  Last Fill Quantity: 180,   # refills: 3  Last Office Visit : 4/13/17  Future Office visit:  NONE    ondansetron       Last Written Prescription Date:  5/31/17  Last Fill Quantity: 10,   # refills: 3

## 2017-11-29 ENCOUNTER — TELEPHONE (OUTPATIENT)
Dept: INTERNAL MEDICINE | Facility: CLINIC | Age: 57
End: 2017-11-29

## 2017-11-29 NOTE — TELEPHONE ENCOUNTER
Pt states that orthopedic shoes (ordered by Dr. Joseph on 4/13)  are finally covered by the insurance now and he needs a prescription. I mailed the prescription to home per request.

## 2017-12-12 DIAGNOSIS — Z94.0 S/P KIDNEY TRANSPLANT: Primary | ICD-10-CM

## 2017-12-15 RX ORDER — OMEGA-3S/DHA/EPA/FISH OIL/D3 300MG-1000
400 CAPSULE ORAL DAILY
Qty: 90 TABLET | Refills: 1 | Status: SHIPPED | OUTPATIENT
Start: 2017-12-15 | End: 2018-05-31

## 2017-12-20 ENCOUNTER — MEDICAL CORRESPONDENCE (OUTPATIENT)
Dept: HEALTH INFORMATION MANAGEMENT | Facility: CLINIC | Age: 57
End: 2017-12-20

## 2017-12-20 ENCOUNTER — OFFICE VISIT (OUTPATIENT)
Dept: INTERNAL MEDICINE | Facility: CLINIC | Age: 57
End: 2017-12-20
Payer: MEDICARE

## 2017-12-20 VITALS
BODY MASS INDEX: 25.74 KG/M2 | SYSTOLIC BLOOD PRESSURE: 157 MMHG | WEIGHT: 189.8 LBS | DIASTOLIC BLOOD PRESSURE: 94 MMHG | HEART RATE: 82 BPM

## 2017-12-20 DIAGNOSIS — F51.04 CHRONIC INSOMNIA: ICD-10-CM

## 2017-12-20 DIAGNOSIS — R20.0 TACTILE ANESTHESIA: ICD-10-CM

## 2017-12-20 DIAGNOSIS — Z86.31 PERSONAL HISTORY OF DIABETIC FOOT ULCER: ICD-10-CM

## 2017-12-20 DIAGNOSIS — M21.371 RIGHT FOOT DROP: ICD-10-CM

## 2017-12-20 DIAGNOSIS — M79.671 RIGHT FOOT PAIN: ICD-10-CM

## 2017-12-20 DIAGNOSIS — E10.40 DIABETIC NEUROPATHY, TYPE I DIABETES MELLITUS (H): Primary | ICD-10-CM

## 2017-12-20 DIAGNOSIS — M79.89 SWELLING OF LIMB: ICD-10-CM

## 2017-12-20 PROBLEM — M79.672 LEFT FOOT PAIN: Status: ACTIVE | Noted: 2017-12-20

## 2017-12-20 PROBLEM — M21.331 WRIST DROP, BILATERAL: Status: ACTIVE | Noted: 2017-12-20

## 2017-12-20 PROBLEM — M21.332 WRIST DROP, BILATERAL: Status: ACTIVE | Noted: 2017-12-20

## 2017-12-20 ASSESSMENT — PAIN SCALES - GENERAL: PAINLEVEL: NO PAIN (0)

## 2017-12-20 NOTE — PROGRESS NOTES
CC:  Order for DME    S:  Jose Alfredo is here for a required face-to-face encounter regarding an order for a    Right carbon fiber AFO replacement due to fracture at posterior strut and due to age of brace greater than 5 years.    Jose Alfredo requires AFOs for support and stability to enable safe ambulation    His pertinent medical history is as noted below.    ROS:  Briefly, he mentioned chronic insomnia.  Mainly has trouble getting back to sleep when he wakes up at night.  Has been to a sleep clinic in the past and is not interested in taking medications for it.  We discussed cognitive behavioral therapy.  Patient Active Problem List   Diagnosis     Pancreas transplanted (H)     S/P kidney transplant     History of diabetes mellitus, type I     Hyperlipidemia     Peripheral neuropathy     PAD (peripheral artery disease) (H)     Skin exam, screening for cancer     Seborrheic keratosis     Acquired perforating dermatosis     Cellulitis     Atrial bigeminy     Kidney replaced by transplant     Pancreas replaced by transplant (H)     Benign essential hypertension     Diabetic neuropathy, type I diabetes mellitus (H)     Right foot pain     Left foot pain     Tactile anesthesia     Personal history of diabetic foot ulcer     Right foot drop     Wrist drop, bilateral     Swelling of limb     Past Surgical History:   Procedure Laterality Date     COLONOSCOPY  12/31/2013    Procedure: COMBINED COLONOSCOPY, SINGLE BIOPSY/POLYPECTOMY BY BIOPSY;  COLONOSCOPY;  Surgeon: Isac Colby MD;  Location:  GI     ESOPHAGOSCOPY, GASTROSCOPY, DUODENOSCOPY (EGD), COMBINED  1/20/2012    Procedure:COMBINED ESOPHAGOSCOPY, GASTROSCOPY, DUODENOSCOPY (EGD); Surgeon:GAB MARTIN; Location: GI     TRANSPLANT  2007    K/P     Current Outpatient Prescriptions   Medication Sig Dispense Refill     order for DME Right carbon fiber AFO replacement due to fracture at posterior strut and due to age of brace greater than 5 years.  Patient  requires AFOs for support and stability to enable safe ambulation 1 Units 1     cholecalciferol (VITAMIN D) 400 UNITS tablet Take 1 tablet (400 Units) by mouth daily 90 tablet 1     ascorbic acid (VITAMIN C) 500 MG tablet Take 2 tablets (1,000 mg) by mouth daily 180 tablet 3     ondansetron (ZOFRAN-ODT) 4 MG ODT tab DISSOLVE ONE TABLET ON TONGUE EVERY 8 HOURS AS NEEDED FOR NAUSEA 10 tablet 3     simvastatin (ZOCOR) 20 MG tablet Take 1 tablet (20 mg) by mouth At Bedtime 90 tablet 2     omeprazole (PRILOSEC) 20 MG CR capsule Take 1 capsule (20 mg) by mouth 2 times daily 180 capsule 2     simvastatin (ZOCOR) 20 MG tablet Take 1 tablet (20 mg) by mouth At Bedtime 90 tablet 1     PROGRAF 1 MG PO CAPSULE Take 1 capsule (1 mg) by mouth 2 times daily - total dose 1.5mg twice per day. 60 capsule 11     PROGRAF 0.5 MG PO CAPSULE Take 1 capsule (0.5 mg) by mouth 2 times daily Total dose = 1.5 mg BID 60 capsule 11     CELLCEPT 500 MG PO TABLET Take 1 tablet (500 mg) by mouth 2 times daily 60 tablet 11     sulfamethoxazole-trimethoprim (BACTRIM) 400-80 MG per tablet Take 1 tablet by mouth twice a week 10 tablet 11     sodium bicarbonate 650 MG tablet TAKE ONE TABLET BY MOUTH THREE TIMES A DAY 90 tablet 11     amLODIPine (NORVASC) 2.5 MG tablet Take 1 tablet (2.5 mg) by mouth daily 90 tablet 3     aspirin 81 MG EC tablet Take 1 tablet (81 mg) by mouth daily 90 tablet 3     Cholecalciferol 400 UNITS CAPS Take 1 capsule by mouth daily 90 capsule 3     multivitamin, therapeutic with minerals (MULTI-VITAMIN) TABS tablet Take 1 tablet by mouth daily 100 each 3     omeprazole (PRILOSEC) 20 MG capsule Take 1 capsule (20 mg) by mouth 2 times daily Annual LOGEAIS MD APPT. DUE JAN 2017 180 capsule 1     order for DME Equipment being ordered: therapeutic shoes and insoles. For Peripheral neuropathy, diabetes type 1 and poor circulation 1 Package 1     aspirin 81 MG tablet Take 1 tablet (81 mg) by mouth daily 100 tablet 3     No Known  Allergies  BP (!) 157/94  Pulse 82  Wt 86.1 kg (189 lb 12.8 oz)  BMI 25.74 kg/m2  BP Readings from Last 6 Encounters:   12/20/17 (!) 157/94   04/13/17 131/74   01/24/17 149/75   07/26/16 128/77   06/26/16 110/56   02/05/16 135/76     Jose Alfredo was seen today for dme.    Diagnoses and all orders for this visit:    Diabetic neuropathy, type I diabetes mellitus (H), right foot pain, tactile anesthesia, hx of diabetic foot ulcer, hx foot swelling  -     order for DME; Right carbon fiber AFO replacement due to fracture at posterior strut and due to age of brace greater than 5 years.  Patient requires AFOs for support and stability to enable safe ambulation    Chronic insomnia  -     PSYCHOLOGY REFERRAL; Future for CBT.  Patient will schedule if he decides to pursue therapy.    Total time spent 15 minutes.  More than 50% of the time spent with Mr. Tomlinson on counseling / coordinating his care      Barbra Joseph M.D.  Internal Medicine  [facility]   pager [phone]

## 2017-12-20 NOTE — MR AVS SNAPSHOT
After Visit Summary   12/20/2017    Jose Alfredo Tomlinson    MRN: [id number]           Patient Information     Date Of Birth          1960        Visit Information        Provider Department      12/20/2017 9:40 AM Barbra Joseph MD [facility]        Today's Diagnoses     Diabetic neuropathy, type I diabetes mellitus (H)    -  1    Right foot pain        Tactile anesthesia        Personal history of diabetic foot ulcer        Right foot drop        Swelling of limb        Chronic insomnia          Care Instructions    [facility]: [phone]     [facility] Center Medication Refill Request Information:  * Please contact your pharmacy regarding ANY request for medication refills.  ** [facility] Prescription Fax = [phone]  * Please allow 3 business days for routine medication refills.  * Please allow 5 business days for controlled substance medication refills.     [facility] Test Result notification information:  *You will be notified with in 7-10 days of your appointment day regarding the results of your test.  If you are on MyChart you will be notified as soon as the provider has reviewed the results and signed off on them.            Follow-ups after your visit        Additional Services     PSYCHOLOGY REFERRAL       Referral for COGNITIVE BEHAVIORAL THERAPY for chronic insomnia  Patient will schedule if he decides to pursue therapy                  Future tests that were ordered for you today     Open Future Orders        Priority Expected Expires Ordered    PSYCHOLOGY REFERRAL Routine  12/20/2018 12/20/2017            Who to contact     Please call your clinic at [phone] to:    Ask questions about your health    Make or cancel appointments    Discuss your medicines    Learn about your test results    Speak to your doctor   If you have compliments or concerns about an experience at your clinic, or if you wish to file a complaint, please contact [facility] Patient Relations at [phone] or email us at [email or web address]         Additional Information About Your Visit        [employer] Information     [employer] gives you secure access to your electronic health record. If you see a primary care provider, you can also send messages to your care team and make appointments. If you have questions, please call your primary care clinic.  If you do not have a primary care provider, please call [phone] and they will assist you.      [employer] is an electronic gateway that provides easy, online access to your medical records. With [employer], you can request a clinic appointment, read your test results, renew a prescription or communicate with your care team.     To access your existing account, please contact your [facility] or call [phone] for assistance.        Care EveryWhere ID     This is your Care EveryWhere ID. This could be used by other organizations to access your [facility] medical records  [id number]        Your Vitals Were     Pulse BMI (Body Mass Index)                82 25.74 kg/m2           Blood Pressure from Last 3 Encounters:   12/20/17 (!) 157/94   04/13/17 131/74   01/24/17 149/75    Weight from Last 3 Encounters:   12/20/17 86.1 kg (189 lb 12.8 oz)   04/13/17 85.8 kg (189 lb 1.6 oz)   01/24/17 83.6 kg (184 lb 6.4 oz)                 Today's Medication Changes          These changes are accurate as of: 12/20/17  4:57 PM.  If you have any questions, ask your nurse or doctor.               These medicines have changed or have updated prescriptions.        Dose/Directions    * order for DME   This may have changed:  Another medication with the same name was added. Make sure you understand how and when to take each.   Used for:  Poor circulation, Peripheral neuropathy, Type 1 diabetes mellitus (H)   Changed by:  Rosio Salas MD        Equipment being ordered: therapeutic shoes  and insoles. For Peripheral neuropathy, diabetes type 1 and poor circulation   Quantity:  1 Package   Refills:  1       * order for DME   This may have changed:  You were already taking a medication with the same name, and this prescription was added. Make sure you understand how and when to take each.   Used for:  Diabetic neuropathy, type I diabetes mellitus (H), Right foot pain, Tactile anesthesia, Personal history of diabetic foot ulcer, Right foot drop   Changed by:  Barbra Joseph MD        Right carbon fiber AFO replacement due to fracture at posterior strut and due to age of brace greater than 5 years. Patient requires AFOs for support and stability to enable safe ambulation   Quantity:  1 Units   Refills:  1       * Notice:  This list has 2 medication(s) that are the same as other medications prescribed for you. Read the directions carefully, and ask your doctor or other care provider to review them with you.         Where to get your medicines      Some of these will need a paper prescription and others can be bought over the counter.  Ask your nurse if you have questions.     Bring a paper prescription for each of these medications     order for DME                Primary Care Provider Office Phone # Fax #    Rosio Salas [phone]       [address] [place] [address]  [facility] [id number]        Equal Access to Services     MARY URIBE AH: Charanjit Zamora, camilla akbar, андрей vergara, clemencia swift. So [facility] [phone].    ATENCIÓN: Si habla español, tiene a abel disposición servicios gratuitos de asistencia lingüística. Llame al [phone].    We comply with applicable federal civil rights laws and [place] laws. We do not discriminate on the basis of race, color, national origin, age, disability, sex, sexual orientation, or gender identity.            Thank you!     Thank you for choosing [facility]  for  your care. Our goal is always to provide you with excellent care. Hearing back from our patients is one way we can continue to improve our services. Please take a few minutes to complete the written survey that you may receive in the mail after your visit with us. Thank you!             Your Updated Medication List - Protect others around you: Learn how to safely use, store and throw away your medicines at www.disposemymeds.org.          This list is accurate as of: 12/20/17  4:57 PM.  Always use your most recent med list.                   Brand Name Dispense Instructions for use Diagnosis    amLODIPine 2.5 MG tablet    NORVASC    90 tablet    Take 1 tablet (2.5 mg) by mouth daily    Benign essential hypertension       ascorbic acid 500 MG tablet    VITAMIN C    180 tablet    Take 2 tablets (1,000 mg) by mouth daily    Pancreas transplanted (H), S/P kidney transplant, Peripheral neuropathy       * aspirin 81 MG tablet     100 tablet    Take 1 tablet (81 mg) by mouth daily    Pancreas transplanted (H), S/P kidney transplant, Peripheral neuropathy       * aspirin 81 MG EC tablet     90 tablet    Take 1 tablet (81 mg) by mouth daily    S/P kidney transplant       * Cholecalciferol 400 UNITS Caps     90 capsule    Take 1 capsule by mouth daily    S/P kidney transplant       * cholecalciferol 400 UNITS tablet    Vitamin D    90 tablet    Take 1 tablet (400 Units) by mouth daily    S/P kidney transplant       multivitamin, therapeutic with minerals Tabs tablet     100 each    Take 1 tablet by mouth daily    Pancreas transplanted (H), S/P kidney transplant, Peripheral neuropathy       mycophenolate 500 MG tablet     60 tablet    Take 1 tablet (500 mg) by mouth 2 times daily    Kidney replaced by transplant, Pancreas replaced by transplant (H)       * omeprazole 20 MG CR capsule    priLOSEC    180 capsule    Take 1 capsule (20 mg) by mouth 2 times daily Annual LOGEAIS MD APPT. DUE JAN 2017    Esophageal reflux       *  omeprazole 20 MG CR capsule    priLOSEC    180 capsule    Take 1 capsule (20 mg) by mouth 2 times daily    Esophageal reflux       ondansetron 4 MG ODT tab    ZOFRAN-ODT    10 tablet    DISSOLVE ONE TABLET ON TONGUE EVERY 8 HOURS AS NEEDED FOR NAUSEA    Non-intractable cyclical vomiting with nausea       * order for DME     1 Package    Equipment being ordered: therapeutic shoes and insoles. For Peripheral neuropathy, diabetes type 1 and poor circulation    Poor circulation, Peripheral neuropathy, Type 1 diabetes mellitus (H)       * order for DME     1 Units    Right carbon fiber AFO replacement due to fracture at posterior strut and due to age of brace greater than 5 years. Patient requires AFOs for support and stability to enable safe ambulation    Diabetic neuropathy, type I diabetes mellitus (H), Right foot pain, Tactile anesthesia, Personal history of diabetic foot ulcer, Right foot drop       * simvastatin 20 MG tablet    ZOCOR    90 tablet    Take 1 tablet (20 mg) by mouth At Bedtime    Hypercholesteremia       * simvastatin 20 MG tablet    ZOCOR    90 tablet    Take 1 tablet (20 mg) by mouth At Bedtime    Hypercholesteremia       sodium bicarbonate 650 MG tablet     90 tablet    TAKE ONE TABLET BY MOUTH THREE TIMES A DAY    Kidney replaced by transplant, Pancreas replaced by transplant (H)       sulfamethoxazole-trimethoprim 400-80 MG per tablet    BACTRIM    10 tablet    Take 1 tablet by mouth twice a week    Kidney replaced by transplant, Pancreas replaced by transplant (H)       * tacrolimus 1 MG capsule     60 capsule    Take 1 capsule (1 mg) by mouth 2 times daily - total dose 1.5mg twice per day.    Kidney replaced by transplant, Pancreas replaced by transplant (H)       * tacrolimus 0.5 MG capsule     60 capsule    Take 1 capsule (0.5 mg) by mouth 2 times daily Total dose = 1.5 mg BID    Kidney replaced by transplant, Pancreas replaced by transplant (H)       * Notice:  This list has 12 medication(s)  that are the same as other medications prescribed for you. Read the directions carefully, and ask your doctor or other care provider to review them with you.

## 2017-12-20 NOTE — NURSING NOTE
Chief Complaint   Patient presents with     Dme     Patient here for DME order.       Ivonne Malik CMA at 9:50 AM on 12/20/2017.

## 2017-12-20 NOTE — PROGRESS NOTES
Rooming Note  Health Maintenance   Health Maintenance Due   Topic Date Due     TSH W/ FREE T4 REFLEX Q2 YEAR  06/10/1961     EYE EXAM Q1 YEAR  03/21/2015     A1C Q6 MO  07/25/2016     MICROALBUMIN Q1 YEAR  01/25/2017     LIPID MONITORING Q1 YEAR  05/10/2017     ADVANCE DIRECTIVE PLANNING Q5 YRS  06/10/2017     INFLUENZA VACCINE (SYSTEM ASSIGNED)  09/01/2017    Patient declined to discuss HM.  Blood Pressure   BP Readings from Last 1 Encounters:   04/13/17 131/74    Single BP recheck started, 9:52 AM (4 minutes)

## 2017-12-20 NOTE — PATIENT INSTRUCTIONS
[facility]: [phone]     [facility] Medication Refill Request Information:  * Please contact your pharmacy regarding ANY request for medication refills.  ** [facility] Prescription Fax = [phone]  * Please allow 3 business days for routine medication refills.  * Please allow 5 business days for controlled substance medication refills.     [facility] Test Result notification information:  *You will be notified with in 7-10 days of your appointment day regarding the results of your test.  If you are on MyChart you will be notified as soon as the provider has reviewed the results and signed off on them.

## 2018-01-09 DIAGNOSIS — Z94.0 S/P KIDNEY TRANSPLANT: ICD-10-CM

## 2018-01-09 DIAGNOSIS — Z94.0 KIDNEY REPLACED BY TRANSPLANT: ICD-10-CM

## 2018-01-09 DIAGNOSIS — Z94.83 PANCREAS REPLACED BY TRANSPLANT (H): ICD-10-CM

## 2018-01-09 DIAGNOSIS — R11.15 NON-INTRACTABLE CYCLICAL VOMITING WITH NAUSEA: ICD-10-CM

## 2018-01-09 RX ORDER — TACROLIMUS 1 MG/1
1 CAPSULE, GELATIN COATED ORAL 2 TIMES DAILY
Qty: 60 CAPSULE | Refills: 0 | Status: SHIPPED | OUTPATIENT
Start: 2018-01-09 | End: 2018-02-05

## 2018-01-09 RX ORDER — MYCOPHENOLATE MOFETIL 500 MG/1
500 TABLET, FILM COATED ORAL 2 TIMES DAILY
Qty: 60 TABLET | Refills: 0 | Status: SHIPPED | OUTPATIENT
Start: 2018-01-09 | End: 2018-02-05

## 2018-01-09 RX ORDER — TACROLIMUS 0.5 MG/1
0.5 CAPSULE, GELATIN COATED ORAL 2 TIMES DAILY
Qty: 60 CAPSULE | Refills: 0 | Status: SHIPPED | OUTPATIENT
Start: 2018-01-09 | End: 2018-02-05

## 2018-01-09 NOTE — TELEPHONE ENCOUNTER
Drug: Cellcept   Last Fill Date: 12/18/2017  Quantity: 60              Drug: Prograf 0.5mg  Last Fill Date: 12/18/2017  Quantity: 60          Drug: Prograf 1mg  Last Fill Date: 12/18/2017  Quantity: 60

## 2018-01-10 RX ORDER — SODIUM BICARBONATE 650 MG/1
TABLET ORAL
Qty: 90 TABLET | Refills: 11 | Status: SHIPPED | OUTPATIENT
Start: 2018-01-10 | End: 2018-12-11

## 2018-01-11 RX ORDER — ONDANSETRON 4 MG/1
TABLET, ORALLY DISINTEGRATING ORAL
Qty: 10 TABLET | Refills: 5 | Status: SHIPPED | OUTPATIENT
Start: 2018-01-11 | End: 2018-02-07

## 2018-01-11 RX ORDER — MULTIVITAMIN WITH FOLIC ACID 400 MCG
1 TABLET ORAL DAILY
Qty: 90 TABLET | Refills: 3 | Status: SHIPPED | OUTPATIENT
Start: 2018-01-11 | End: 2018-12-11

## 2018-01-16 ENCOUNTER — RADIANT APPOINTMENT (OUTPATIENT)
Dept: GENERAL RADIOLOGY | Facility: CLINIC | Age: 58
End: 2018-01-16
Attending: INTERNAL MEDICINE
Payer: MEDICARE

## 2018-01-16 ENCOUNTER — OFFICE VISIT (OUTPATIENT)
Dept: INTERNAL MEDICINE | Facility: CLINIC | Age: 58
End: 2018-01-16
Payer: MEDICARE

## 2018-01-16 VITALS
HEART RATE: 76 BPM | SYSTOLIC BLOOD PRESSURE: 154 MMHG | BODY MASS INDEX: 26.85 KG/M2 | RESPIRATION RATE: 18 BRPM | WEIGHT: 198 LBS | DIASTOLIC BLOOD PRESSURE: 84 MMHG

## 2018-01-16 DIAGNOSIS — M25.522 LEFT ELBOW PAIN: Primary | ICD-10-CM

## 2018-01-16 DIAGNOSIS — M25.522 LEFT ELBOW PAIN: ICD-10-CM

## 2018-01-16 LAB — RADIOLOGIST FLAGS: NORMAL

## 2018-01-16 NOTE — MR AVS SNAPSHOT
After Visit Summary   1/16/2018    Jose Alfredo Tomlinson    MRN: [id number]           Patient Information     Date Of Birth          1960        Visit Information        Provider Department      1/16/2018 5:00 PM Tereza Arias MD [facility]        Today's Diagnoses     Left elbow pain    -  1      Care Instructions    Primary Care Center Medication Refill Request Information:  * Please contact your pharmacy regarding ANY request for medication refills.  ** [facility] Prescription Fax = [phone]  * Please allow 3 business days for routine medication refills.  * Please allow 5 business days for controlled substance medication refills.     Primary Care Center Test Result notification information:  *You will be notified with in 7-10 days of your appointment day regarding the results of your test.  If you are on MyChart you will be notified as soon as the provider has reviewed the results and signed off on them.    Primary Care Center [phone]     Xray today  Likely this is Golfer's Elbow  Can try at home exercises in handout  Can try elbow brace  Can take Aleve or ibuprofen as needed for pain  Ice may also help          Follow-ups after your visit        Your next 10 appointments already scheduled     Jan 16, 2018  5:00 PM CST   (Arrive by 4:45 PM)   Return Visit with Tereza Acevedo MD   [facility] ([facility])    [address] [place] [id number] [phone]              Who to contact     Please call your clinic at [phone] to:    Ask questions about your health    Make or cancel appointments    Discuss your medicines    Learn about your test results    Speak to your doctor   If you have compliments or concerns about an experience at your clinic, or if you wish to file a complaint, please contact [facility] Patient Relations at [phone] or email us at  [email or web address]         Additional Information About Your Visit        Chapatizhart Information     [employer] gives you secure access to your electronic health record. If you see a primary care provider, you can also send messages to your care team and make appointments. If you have questions, please call your primary care clinic.  If you do not have a primary care provider, please call [phone] and they will assist you.      [email or web address] is an electronic gateway that provides easy, online access to your medical records. With [email or web address], you can request a clinic appointment, read your test results, renew a prescription or communicate with your care team.     To access your existing account, please contact your [facility] or call [phone] for assistance.        Care EveryWhere ID     This is your Care EveryWhere ID. This could be used by other organizations to access your [place] medical records  [id number]        Your Vitals Were     Pulse Respirations BMI (Body Mass Index)             76 18 26.85 kg/m2          Blood Pressure from Last 3 Encounters:   01/16/18 154/84   12/20/17 (!) 157/94   04/13/17 131/74    Weight from Last 3 Encounters:   01/16/18 89.8 kg (198 lb)   12/20/17 86.1 kg (189 lb 12.8 oz)   04/13/17 85.8 kg (189 lb 1.6 oz)              Today, you had the following     No orders found for display       Primary Care Provider Office Phone # Fax #    Rosio Salas [phone]       [address] [place] [address] [address] [place] [address]        Equal Access to Services     MARY URIBE : Hadii aad ku hadasho Soomaali, waaxda luqadaha, qaybta kaalmada adeegyakenyon, clemencia santiago . So [facility] [phone].    ATENCIÓN: Si habla español, tiene a abel disposición servicios gratuitos de asistencia lingüística. Llame al [phone].    We comply with applicable federal civil rights laws and [place] laws. We do not discriminate on  the basis of race, color, national origin, age, disability, sex, sexual orientation, or gender identity.            Thank you!     Thank you for choosing [facility]  for your care. Our goal is always to provide you with excellent care. Hearing back from our patients is one way we can continue to improve our services. Please take a few minutes to complete the written survey that you may receive in the mail after your visit with us. Thank you!             Your Updated Medication List - Protect others around you: Learn how to safely use, store and throw away your medicines at www.disposemymeds.org.          This list is accurate as of: 1/16/18  4:46 PM.  Always use your most recent med list.                   Brand Name Dispense Instructions for use Diagnosis    amLODIPine 2.5 MG tablet    NORVASC    90 tablet    Take 1 tablet (2.5 mg) by mouth daily    Benign essential hypertension       ascorbic acid 500 MG tablet    VITAMIN C    180 tablet    Take 2 tablets (1,000 mg) by mouth daily    Pancreas transplanted (H), S/P kidney transplant, Peripheral neuropathy       * aspirin 81 MG tablet     100 tablet    Take 1 tablet (81 mg) by mouth daily    Pancreas transplanted (H), S/P kidney transplant, Peripheral neuropathy       * aspirin 81 MG EC tablet     90 tablet    Take 1 tablet (81 mg) by mouth daily    S/P kidney transplant       * Cholecalciferol 400 UNITS Caps     90 capsule    Take 1 capsule by mouth daily    S/P kidney transplant       * cholecalciferol 400 UNITS tablet    Vitamin D    90 tablet    Take 1 tablet (400 Units) by mouth daily    S/P kidney transplant       multivitamin, therapeutic with minerals Tabs tablet     100 each    Take 1 tablet by mouth daily    Pancreas transplanted (H), S/P kidney transplant, Peripheral neuropathy       mycophenolate 500 MG tablet     60 tablet    Take 1 tablet (500 mg) by mouth 2 times daily    Kidney replaced by transplant, Pancreas replaced by transplant  (H)       * omeprazole 20 MG CR capsule    priLOSEC    180 capsule    Take 1 capsule (20 mg) by mouth 2 times daily Annual KRISTIAN WASHINGTON APPT. DUE JAN 2017    Esophageal reflux       * omeprazole 20 MG CR capsule    priLOSEC    180 capsule    Take 1 capsule (20 mg) by mouth 2 times daily    Esophageal reflux       ondansetron 4 MG ODT tab    ZOFRAN-ODT    10 tablet    DISSOLVE 1 TABLET ON THE TONGUE EVERY 8 HOURS AS NEEDED FOR NAUSEA    Non-intractable cyclical vomiting with nausea       * order for DME     1 Package    Equipment being ordered: therapeutic shoes and insoles. For Peripheral neuropathy, diabetes type 1 and poor circulation    Poor circulation, Peripheral neuropathy, Type 1 diabetes mellitus (H)       * order for DME     1 Units    Right carbon fiber AFO replacement due to fracture at posterior strut and due to age of brace greater than 5 years. Patient requires AFOs for support and stability to enable safe ambulation    Diabetic neuropathy, type I diabetes mellitus (H), Right foot pain, Tactile anesthesia, Personal history of diabetic foot ulcer, Right foot drop       * simvastatin 20 MG tablet    ZOCOR    90 tablet    Take 1 tablet (20 mg) by mouth At Bedtime    Hypercholesteremia       * simvastatin 20 MG tablet    ZOCOR    90 tablet    Take 1 tablet (20 mg) by mouth At Bedtime    Hypercholesteremia       sodium bicarbonate 650 MG tablet     90 tablet    TAKE ONE TABLET BY MOUTH THREE TIMES A DAY    Kidney replaced by transplant, Pancreas replaced by transplant (H)       sulfamethoxazole-trimethoprim 400-80 MG per tablet    BACTRIM    10 tablet    Take 1 tablet by mouth twice a week    Kidney replaced by transplant, Pancreas replaced by transplant (H)       TAB-A-AMBER Tabs     90 tablet    Take 1 tablet by mouth daily    Non-intractable cyclical vomiting with nausea, S/P kidney transplant       * tacrolimus 0.5 MG capsule     60 capsule    Take 1 capsule (0.5 mg) by mouth 2 times daily Total dose = 1.5  mg BID    Kidney replaced by transplant, Pancreas replaced by transplant (H)       * tacrolimus 1 MG capsule     60 capsule    Take 1 capsule (1 mg) by mouth 2 times daily - total dose 1.5mg twice per day.    Kidney replaced by transplant, Pancreas replaced by transplant (H)       * Notice:  This list has 12 medication(s) that are the same as other medications prescribed for you. Read the directions carefully, and ask your doctor or other care provider to review them with you.

## 2018-01-16 NOTE — PATIENT INSTRUCTIONS
Primary Care Center Medication Refill Request Information:  * Please contact your pharmacy regarding ANY request for medication refills.  ** [facility] Prescription Fax = [phone]  * Please allow 3 business days for routine medication refills.  * Please allow 5 business days for controlled substance medication refills.     Primary Care Center Test Result notification information:  *You will be notified with in 7-10 days of your appointment day regarding the results of your test.  If you are on MyChart you will be notified as soon as the provider has reviewed the results and signed off on them.    Primary Care Center [phone]     Xray today  Likely this is Golfer's Elbow  Can try at home exercises in handout  Can try elbow brace  Can take Aleve or ibuprofen as needed for pain  Ice may also help

## 2018-01-16 NOTE — NURSING NOTE
Chief Complaint   Patient presents with     Elbow left     Patient is here for left elbow pain; possibly related from a fall 1 month ago     Cesario Sy CMA (AAMA) at 4:37 PM on 1/16/2018

## 2018-01-16 NOTE — PROGRESS NOTES
PRIMARY CARE CENTER       SUBJECTIVE:  Jose Alfredo Tomlinson is a 57 year old male who comes in for evaluation of L elbow pain. It is really bothering him today, but also was yesterday. He notes in the past month, he was moving, and may have fallen. It is painful over the medial aspect of his elbow, radiating up into his arm. He denies swelling or erythema.     Past Medical History:   Diagnosis Date     Cataracts      Depression      Gastroparesis      GERD (gastroesophageal reflux disease)      History of diabetes mellitus, type I      Hyperlipidemia      Hypertension      Pancreas transplanted (H) 2007    hx of rejection in past      Peripheral neuropathy      S/P kidney transplant 2007     Medications and allergies reviewed by me today.     ROS:   Constitutional, HEENT, cardiovascular, pulmonary, gi and gu systems are negative, except as otherwise noted.      OBJECTIVE:  /84 (BP Location: Right arm, Patient Position: Chair, Cuff Size: Adult Regular)  Pulse 76  Resp 18  Wt 89.8 kg (198 lb)  BMI 26.85 kg/m2   Wt Readings from Last 1 Encounters:   01/16/18 89.8 kg (198 lb)     Gen: Pleasant male, in NAD  MSK: L elbow without erythema or swelling, no warmth, full ROM, pain with supination. Pain with palpation of medial epicondyle. Pain with flexion of wrist when elbow extended.      ASSESSMENT/PLAN:    Jose Alfredo was seen today for elbow left.    Diagnoses and all orders for this visit:    Left elbow pain. Suspect golfer's elbow. Will r/o fracture with xray. Otherwise, provided handout for treatment of Golfer's Elbow, including bracing and exercises. Also can try ice and NSAIDs.   -     XR Elbow Left G/E 3 Views; Future         Pt should return to clinic for f/u with me in PRN     Tereza Acevedo MD  01/16/18

## 2018-01-17 ENCOUNTER — TELEPHONE (OUTPATIENT)
Dept: INTERNAL MEDICINE | Facility: CLINIC | Age: 58
End: 2018-01-17

## 2018-01-17 NOTE — TELEPHONE ENCOUNTER
Xray shows he may actually have a fracture of his elbow. Please have him see sports med in next few days. Thx.

## 2018-01-19 ENCOUNTER — OFFICE VISIT (OUTPATIENT)
Dept: ORTHOPEDICS | Facility: CLINIC | Age: 58
End: 2018-01-19
Payer: MEDICARE

## 2018-01-19 VITALS — HEIGHT: 71 IN | RESPIRATION RATE: 16 BRPM | WEIGHT: 198 LBS | BODY MASS INDEX: 27.72 KG/M2

## 2018-01-19 DIAGNOSIS — S42.445D CLOSED NONDISPLACED AVULSION FRACTURE OF MEDIAL EPICONDYLE OF LEFT HUMERUS WITH ROUTINE HEALING, SUBSEQUENT ENCOUNTER: Primary | ICD-10-CM

## 2018-01-19 NOTE — MR AVS SNAPSHOT
After Visit Summary   1/19/2018    Jose Alfredo Tomlinson    MRN: [id number]           Patient Information     Date Of Birth          1960        Visit Information        Provider Department      1/19/2018 1:45 PM Marilyn Elizondo MD [facility]        Today's Diagnoses     Closed nondisplaced avulsion fracture of medial epicondyle of left humerus with routine healing, subsequent encounter    -  1       Follow-ups after your visit        Additional Services     TOYA PT, HAND, AND CHIROPRACTIC REFERRAL       **This order will print in the [facility] Scheduling Office**    Physical Therapy, Hand Therapy and Chiropractic Care are available through:    *[facility]  *[facility]  *[facility]    Call one number to schedule at any of the above locations: [phone].    Your provider has referred you to: Hand Therapy    Indication/Reason for Referral: Elbow Pain  Onset of Illness: 1 month  Therapy Orders: Evaluate and Treat  Special Programs: None  Special Request: None    Deion Albarran      Additional Comments for the Therapist or Chiropractor: irregular medial epicondyle, 1 month out avulsion fracture    Please be aware that coverage of these services is subject to the terms and limitations of your health insurance plan.  Call member services at your health plan with any benefit or coverage questions.      Please bring the following to your appointment:    *Your personal calendar for scheduling future appointments  *Comfortable clothing                  Follow-up notes from your care team     Return in about 2 weeks (around 2/2/2018), or if symptoms worsen or fail to improve.      Your next 10 appointments already scheduled     Jan 26, 2018  4:00 PM CST   (Arrive by 3:45 PM)   TOYA Talamantes with [facility] ([facility])    [address] [place] [address]  "  [phone]              Who to contact     Please call your clinic at [phone] to:    Ask questions about your health    Make or cancel appointments    Discuss your medicines    Learn about your test results    Speak to your doctor   If you have compliments or concerns about an experience at your clinic, or if you wish to file a complaint, please contact [facility] Patient Relations at [phone] or email us at [email or web address]         Additional Information About Your Visit        [employer]oSae[employer]hart Information     [employer] gives you secure access to your electronic health record. If you see a primary care provider, you can also send messages to your care team and make appointments. If you have questions, please call your primary care clinic.  If you do not have a primary care provider, please call [phone] and they will assist you.      [employer] is an electronic gateway that provides easy, online access to your medical records. With [employer], you can request a clinic appointment, read your test results, renew a prescription or communicate with your care team.     To access your existing account, please contact your [facility] or call [phone] for assistance.        Care EveryWhere ID     This is your Care EveryWhere ID. This could be used by other organizations to access your [employer] medical records  [id number]        Your Vitals Were     Respirations Height BMI (Body Mass Index)             16 5' 11\" (1.803 m) 27.62 kg/m2          Blood Pressure from Last 3 Encounters:   01/16/18 154/84   12/20/17 (!) 157/94   04/13/17 131/74    Weight from Last 3 Encounters:   01/19/18 198 lb (89.8 kg)   01/16/18 198 lb (89.8 kg)   12/20/17 189 lb 12.8 oz (86.1 kg)              We Performed the Following     TOYA PT, HAND, AND CHIROPRACTIC REFERRAL        Primary Care Provider Office Phone # Fax #    Rosio Salas [phone] "       [address] [facility] [address]  [facility] [address]        Equal Access to Services     MARY URIBE : Hadii aad ku hadannamariesoni Zamora, wanenoda raffy, qaregulota netochecokenyon vergara, clemencia mobellmally swift. So [facility] [phone].    ATENCIÓN: Si habla español, tiene a abel disposición servicios gratuitos de asistencia lingüística. Llame al [phone].    We comply with applicable federal civil rights laws and [place] laws. We do not discriminate on the basis of race, color, national origin, age, disability, sex, sexual orientation, or gender identity.            Thank you!     Thank you for choosing [facility]  for your care. Our goal is always to provide you with excellent care. Hearing back from our patients is one way we can continue to improve our services. Please take a few minutes to complete the written survey that you may receive in the mail after your visit with us. Thank you!             Your Updated Medication List - Protect others around you: Learn how to safely use, store and throw away your medicines at www.disposemymeds.org.          This list is accurate as of: 1/19/18 11:59 PM.  Always use your most recent med list.                   Brand Name Dispense Instructions for use Diagnosis    amLODIPine 2.5 MG tablet    NORVASC    90 tablet    Take 1 tablet (2.5 mg) by mouth daily    Benign essential hypertension       ascorbic acid 500 MG tablet    VITAMIN C    180 tablet    Take 2 tablets (1,000 mg) by mouth daily    Pancreas transplanted (H), S/P kidney transplant, Peripheral neuropathy       * aspirin 81 MG tablet     100 tablet    Take 1 tablet (81 mg) by mouth daily    Pancreas transplanted (H), S/P kidney transplant, Peripheral neuropathy       * aspirin 81 MG EC tablet     90 tablet    Take 1 tablet (81 mg) by mouth daily    S/P kidney transplant       * Cholecalciferol 400 UNITS Caps     90 capsule    Take 1 capsule by mouth daily    S/P kidney transplant       *  cholecalciferol 400 UNITS tablet    Vitamin D    90 tablet    Take 1 tablet (400 Units) by mouth daily    S/P kidney transplant       multivitamin, therapeutic with minerals Tabs tablet     100 each    Take 1 tablet by mouth daily    Pancreas transplanted (H), S/P kidney transplant, Peripheral neuropathy       mycophenolate 500 MG tablet     60 tablet    Take 1 tablet (500 mg) by mouth 2 times daily    Kidney replaced by transplant, Pancreas replaced by transplant (H)       * omeprazole 20 MG CR capsule    priLOSEC    180 capsule    Take 1 capsule (20 mg) by mouth 2 times daily Annual LOGEAALICIA WASHINGTON APPT. DUE JAN 2017    Esophageal reflux       * omeprazole 20 MG CR capsule    priLOSEC    180 capsule    Take 1 capsule (20 mg) by mouth 2 times daily    Esophageal reflux       ondansetron 4 MG ODT tab    ZOFRAN-ODT    10 tablet    DISSOLVE 1 TABLET ON THE TONGUE EVERY 8 HOURS AS NEEDED FOR NAUSEA    Non-intractable cyclical vomiting with nausea       * order for DME     1 Package    Equipment being ordered: therapeutic shoes and insoles. For Peripheral neuropathy, diabetes type 1 and poor circulation    Poor circulation, Peripheral neuropathy, Type 1 diabetes mellitus (H)       * order for DME     1 Units    Right carbon fiber AFO replacement due to fracture at posterior strut and due to age of brace greater than 5 years. Patient requires AFOs for support and stability to enable safe ambulation    Diabetic neuropathy, type I diabetes mellitus (H), Right foot pain, Tactile anesthesia, Personal history of diabetic foot ulcer, Right foot drop       * simvastatin 20 MG tablet    ZOCOR    90 tablet    Take 1 tablet (20 mg) by mouth At Bedtime    Hypercholesteremia       * simvastatin 20 MG tablet    ZOCOR    90 tablet    Take 1 tablet (20 mg) by mouth At Bedtime    Hypercholesteremia       sodium bicarbonate 650 MG tablet     90 tablet    TAKE ONE TABLET BY MOUTH THREE TIMES A DAY    Kidney replaced by transplant, Pancreas  replaced by transplant (H)       sulfamethoxazole-trimethoprim 400-80 MG per tablet    BACTRIM    10 tablet    Take 1 tablet by mouth twice a week    Kidney replaced by transplant, Pancreas replaced by transplant (H)       TAB-A-AMBER Tabs     90 tablet    Take 1 tablet by mouth daily    Non-intractable cyclical vomiting with nausea, S/P kidney transplant       * tacrolimus 0.5 MG capsule     60 capsule    Take 1 capsule (0.5 mg) by mouth 2 times daily Total dose = 1.5 mg BID    Kidney replaced by transplant, Pancreas replaced by transplant (H)       * tacrolimus 1 MG capsule     60 capsule    Take 1 capsule (1 mg) by mouth 2 times daily - total dose 1.5mg twice per day.    Kidney replaced by transplant, Pancreas replaced by transplant (H)       * Notice:  This list has 12 medication(s) that are the same as other medications prescribed for you. Read the directions carefully, and ask your doctor or other care provider to review them with you.

## 2018-01-19 NOTE — PROGRESS NOTES
Subjective:   Jose Alfredo Tomlinson is a 57 year old male who is c/o left elbow pain. Fell while moving boxes and onto left knee and elbow. Reports that pain got worse the past several days. Flexion is the worst movement. GF is nurse and brought him in to physician.   Less pain if keeps in extension.    Background:   Date of injury: One month ago  Duration of symptoms: 1 months  Mechanism of Injury: Acute; Fall onto elbow  Aggravating factors: Typing, moving boxes overhead, flexing  Relieving Factors: acetaminophen  Prior Evaluation: Prior Physician Evalutation: Dr. Cherelle Acevedo    PAST MEDICAL, SOCIAL, SURGICAL AND FAMILY HISTORY: He  has a past medical history of Cataracts; Depression; Gastroparesis; GERD (gastroesophageal reflux disease); History of diabetes mellitus, type I; Hyperlipidemia; Hypertension; Pancreas transplanted (H) (2007); Peripheral neuropathy; and S/P kidney transplant (2007).  He  has a past surgical history that includes transplant (2007); Esophagoscopy, gastroscopy, duodenoscopy (EGD), combined (1/20/2012); and Colonoscopy (12/31/2013).  His family history includes Arthritis in his mother; CANCER in his maternal grandfather and paternal grandfather; DIABETES in his father, paternal grandfather, and son.  He reports that he has never smoked. He has never used smokeless tobacco. He reports that he does not drink alcohol or use illicit drugs.      ALLERGIES: He has No Known Allergies.    CURRENT MEDICATIONS: He has a current medication list which includes the following prescription(s): tab-a-leila, ondansetron, aspirin, sodium bicarbonate, mycophenolate, tacrolimus, tacrolimus, order for dme, cholecalciferol, ascorbic acid, simvastatin, omeprazole, simvastatin, sulfamethoxazole-trimethoprim, amlodipine, cholecalciferol, multivitamin, therapeutic with minerals, omeprazole, order for dme, and aspirin.     REVIEW OF SYSTEMS: 10 point review of systems is negative except as noted above.     Exam:  "  Resp 16  Ht 5' 11\" (1.803 m)  Wt 198 lb (89.8 kg)  BMI 27.62 kg/m2           CONSTITUTIONAL: alert, no distress, cooperative and over weight  HEAD: Normocephalic. No masses, lesions, tenderness or abnormalities  SKIN: no suspicious lesions or rashes  GAIT: normal  NEUROLOGIC: Non-focal, Normal muscle tone and strength, reflexes normal, sensation grossly normal.  PSYCHIATRIC: affect normal/bright and mentation appears normal.    MUSCULOSKELETAL: left elbow pain  Inspection: no swelling, old ecchymosis  Tender: medial epicondyle and flexor muscle of forearm  Non-tender: lateral epicondyle, common extensor tendon and extensor muscle of forearm  Range of Motion: all normal  Strength: elbow strength decreased  Special tests: normal stability, ulnar Tinel's negative, no pain with resisted wrist extension, pain with resisted wrist flexion:          Assessment/Plan:   Pt is a 58 yo white male with PMHx of PAD, transplant kidney and pancreas, Type 1 DM presenting with left elbow pain  1. Left elbow pain- concern for avulsion fracture of medial epicondyle, 1 month out- gentle ROM with PT, if not improving consider further imaging  RTC 2-4 weeks, with reimaging    X-RAY INTERPRETATION:   X-Ray of the Left Elbow: 3-view, ap, lateral, oblique:  ordered and interpreted in the office today was positive for IMPRESSION: Significant irregularity about the medial epicondyles.  Clinical correlation for possible avulsion injury of the medial flexor  tendons.     [Consider Follow Up: Significant cortical irregularity about the  medial epicondyle of the elbow.]  "

## 2018-01-19 NOTE — LETTER
1/19/2018      RE: Jose Alfredo Tomlinson  [address] [place] [address] [address]     [place] [address]        Subjective:   Jose Alfredo Tomlinson is a 57 year old male who is c/o left elbow pain. Fell while moving boxes and onto left knee and elbow. Reports that pain got worse the past several days. Flexion is the worst movement. GF is nurse and brought him in to physician.   Less pain if keeps in extension.    Background:   Date of injury: One month ago  Duration of symptoms: 1 months  Mechanism of Injury: Acute; Fall onto elbow  Aggravating factors: Typing, moving boxes overhead, flexing  Relieving Factors: acetaminophen  Prior Evaluation: Prior Physician Evalutation: Dr. Cherelle Acevedo    PAST MEDICAL, SOCIAL, SURGICAL AND FAMILY HISTORY: He  has a past medical history of Cataracts; Depression; Gastroparesis; GERD (gastroesophageal reflux disease); History of diabetes mellitus, type I; Hyperlipidemia; Hypertension; Pancreas transplanted (H) (2007); Peripheral neuropathy; and S/P kidney transplant (2007).  He  has a past surgical history that includes transplant (2007); Esophagoscopy, gastroscopy, duodenoscopy (EGD), combined (1/20/2012); and Colonoscopy (12/31/2013).  His family history includes Arthritis in his mother; CANCER in his maternal grandfather and paternal grandfather; DIABETES in his father, paternal grandfather, and son.  He reports that he has never smoked. He has never used smokeless tobacco. He reports that he does not drink alcohol or use illicit drugs.      ALLERGIES: He has No Known Allergies.    CURRENT MEDICATIONS: He has a current medication list which includes the following prescription(s): tab-a-leila, ondansetron, aspirin, sodium bicarbonate, mycophenolate, tacrolimus, tacrolimus, order for dme, cholecalciferol, ascorbic acid, simvastatin, omeprazole, simvastatin, sulfamethoxazole-trimethoprim, amlodipine, cholecalciferol, multivitamin, therapeutic with minerals, omeprazole, order for dme, and  "aspirin.     REVIEW OF SYSTEMS: 10 point review of systems is negative except as noted above.     Exam:   Resp 16  Ht 5' 11\" (1.803 m)  Wt 198 lb (89.8 kg)  BMI 27.62 kg/m2           CONSTITUTIONAL: alert, no distress, cooperative and over weight  HEAD: Normocephalic. No masses, lesions, tenderness or abnormalities  SKIN: no suspicious lesions or rashes  GAIT: normal  NEUROLOGIC: Non-focal, Normal muscle tone and strength, reflexes normal, sensation grossly normal.  PSYCHIATRIC: affect normal/bright and mentation appears normal.    MUSCULOSKELETAL: left elbow pain  Inspection: no swelling, old ecchymosis  Tender: medial epicondyle and flexor muscle of forearm  Non-tender: lateral epicondyle, common extensor tendon and extensor muscle of forearm  Range of Motion: all normal  Strength: elbow strength decreased  Special tests: normal stability, ulnar Tinel's negative, no pain with resisted wrist extension, pain with resisted wrist flexion:          Assessment/Plan:   Pt is a 56 yo white male with PMHx of PAD, transplant kidney and pancreas, Type 1 DM presenting with left elbow pain  1. Left elbow pain- concern for avulsion fracture of medial epicondyle, 1 month out- gentle ROM with PT, if not improving consider further imaging  RTC 2-4 weeks, with reimaging    X-RAY INTERPRETATION:   X-Ray of the Left Elbow: 3-view, ap, lateral, oblique:  ordered and interpreted in the office today was positive for IMPRESSION: Significant irregularity about the medial epicondyles.  Clinical correlation for possible avulsion injury of the medial flexor  tendons.     [Consider Follow Up: Significant cortical irregularity about the  medial epicondyle of the elbow.]    Marilyn Elizondo MD    "

## 2018-02-05 DIAGNOSIS — K21.9 ESOPHAGEAL REFLUX: ICD-10-CM

## 2018-02-05 DIAGNOSIS — I10 BENIGN ESSENTIAL HYPERTENSION: ICD-10-CM

## 2018-02-05 DIAGNOSIS — Z94.0 KIDNEY REPLACED BY TRANSPLANT: Primary | ICD-10-CM

## 2018-02-05 DIAGNOSIS — R11.15 NON-INTRACTABLE CYCLICAL VOMITING WITH NAUSEA: ICD-10-CM

## 2018-02-05 DIAGNOSIS — Z94.83 PANCREAS REPLACED BY TRANSPLANT (H): ICD-10-CM

## 2018-02-05 RX ORDER — ONDANSETRON 4 MG/1
TABLET, ORALLY DISINTEGRATING ORAL
Qty: 10 TABLET | Refills: 5 | OUTPATIENT
Start: 2018-02-05

## 2018-02-05 NOTE — TELEPHONE ENCOUNTER
prilosec   Last Written Prescription Date:  6/1/17  Last Fill Quantity: 180,   # refills: 2  norvasc      Last Written Prescription Date:  1/24/17  Last Fill Quantity: 90,   # refills: 3  Last Office Visit : 1/16/18  Future Office visit:  no    Routing refill request to clinic nurse for review/approval because:norvasc failed protocol  Prilosec, med warning/overrride neede

## 2018-02-06 RX ORDER — TACROLIMUS 1 MG/1
1 CAPSULE, GELATIN COATED ORAL 2 TIMES DAILY
Qty: 60 CAPSULE | Refills: 0 | Status: SHIPPED | OUTPATIENT
Start: 2018-02-06 | End: 2018-03-09

## 2018-02-06 RX ORDER — SULFAMETHOXAZOLE AND TRIMETHOPRIM 400; 80 MG/1; MG/1
1 TABLET ORAL
Qty: 10 TABLET | Refills: 0 | Status: SHIPPED | OUTPATIENT
Start: 2018-02-08 | End: 2018-03-09

## 2018-02-06 RX ORDER — TACROLIMUS 0.5 MG/1
0.5 CAPSULE, GELATIN COATED ORAL 2 TIMES DAILY
Qty: 60 CAPSULE | Refills: 0 | Status: SHIPPED | OUTPATIENT
Start: 2018-02-06 | End: 2018-03-09

## 2018-02-06 RX ORDER — MYCOPHENOLATE MOFETIL 500 MG/1
500 TABLET, FILM COATED ORAL 2 TIMES DAILY
Qty: 60 TABLET | Refills: 0 | Status: SHIPPED | OUTPATIENT
Start: 2018-02-06 | End: 2018-03-09

## 2018-02-06 NOTE — TELEPHONE ENCOUNTER
Spoke to patient because no labs in EPIC x 1 year, patient states he goes to [facility] and that he has had problems in the past with labs arriving here. Patient will call lab and have faxed, patient states from now on he will come to [facility] for labs and will do so next week. Patient also agreed to annual neph follow up and request sent to scheduling.

## 2018-02-07 RX ORDER — AMLODIPINE BESYLATE 2.5 MG/1
2.5 TABLET ORAL DAILY
Qty: 90 TABLET | Refills: 3 | Status: SHIPPED | OUTPATIENT
Start: 2018-02-07 | End: 2019-03-05

## 2018-02-07 RX ORDER — ONDANSETRON 4 MG/1
TABLET, ORALLY DISINTEGRATING ORAL
Qty: 10 TABLET | Refills: 5 | Status: SHIPPED | OUTPATIENT
Start: 2018-02-07 | End: 2018-07-24

## 2018-02-23 DIAGNOSIS — Z94.83 PANCREAS REPLACED BY TRANSPLANT (H): ICD-10-CM

## 2018-02-23 DIAGNOSIS — Z94.0 KIDNEY REPLACED BY TRANSPLANT: ICD-10-CM

## 2018-02-23 LAB
AMYLASE SERPL-CCNC: 61 U/L (ref 30–110)
ANION GAP SERPL CALCULATED.3IONS-SCNC: 8 MMOL/L (ref 3–14)
BUN SERPL-MCNC: 31 MG/DL (ref 7–30)
CALCIUM SERPL-MCNC: 9 MG/DL (ref 8.5–10.1)
CHLORIDE SERPL-SCNC: 114 MMOL/L (ref 94–109)
CO2 SERPL-SCNC: 19 MMOL/L (ref 20–32)
CREAT SERPL-MCNC: 0.85 MG/DL (ref 0.66–1.25)
ERYTHROCYTE [DISTWIDTH] IN BLOOD BY AUTOMATED COUNT: 13.2 % (ref 10–15)
GFR SERPL CREATININE-BSD FRML MDRD: >90 ML/MIN/1.7M2
GLUCOSE SERPL-MCNC: 90 MG/DL (ref 70–99)
HCT VFR BLD AUTO: 42.4 % (ref 40–53)
HGB BLD-MCNC: 14.1 G/DL (ref 13.3–17.7)
LIPASE SERPL-CCNC: 146 U/L (ref 73–393)
MCH RBC QN AUTO: 29.3 PG (ref 26.5–33)
MCHC RBC AUTO-ENTMCNC: 33.3 G/DL (ref 31.5–36.5)
MCV RBC AUTO: 88 FL (ref 78–100)
PLATELET # BLD AUTO: 159 10E9/L (ref 150–450)
POTASSIUM SERPL-SCNC: 4.3 MMOL/L (ref 3.4–5.3)
RBC # BLD AUTO: 4.82 10E12/L (ref 4.4–5.9)
SODIUM SERPL-SCNC: 141 MMOL/L (ref 133–144)
TACROLIMUS BLD-MCNC: 7.5 UG/L (ref 5–15)
TME LAST DOSE: NORMAL H
WBC # BLD AUTO: 7.9 10E9/L (ref 4–11)

## 2018-02-23 PROCEDURE — 80197 ASSAY OF TACROLIMUS: CPT | Performed by: INTERNAL MEDICINE

## 2018-03-07 ENCOUNTER — TELEPHONE (OUTPATIENT)
Dept: NEPHROLOGY | Facility: CLINIC | Age: 58
End: 2018-03-07

## 2018-03-07 NOTE — TELEPHONE ENCOUNTER
Left voicemail for patient to call back (follow up from last transplant appointment).    Macrina Andre RN

## 2018-03-09 DIAGNOSIS — Z94.0 KIDNEY REPLACED BY TRANSPLANT: ICD-10-CM

## 2018-03-09 DIAGNOSIS — Z94.83 PANCREAS REPLACED BY TRANSPLANT (H): ICD-10-CM

## 2018-03-09 RX ORDER — TACROLIMUS 0.5 MG/1
0.5 CAPSULE, GELATIN COATED ORAL 2 TIMES DAILY
Qty: 60 CAPSULE | Refills: 11 | Status: SHIPPED | OUTPATIENT
Start: 2018-03-09 | End: 2019-02-04

## 2018-03-09 RX ORDER — TACROLIMUS 1 MG/1
1 CAPSULE, GELATIN COATED ORAL 2 TIMES DAILY
Qty: 60 CAPSULE | Refills: 11 | Status: SHIPPED | OUTPATIENT
Start: 2018-03-09 | End: 2019-02-04

## 2018-03-09 RX ORDER — SULFAMETHOXAZOLE AND TRIMETHOPRIM 400; 80 MG/1; MG/1
1 TABLET ORAL
Qty: 10 TABLET | Refills: 11 | Status: SHIPPED | OUTPATIENT
Start: 2018-03-12 | End: 2019-03-05

## 2018-03-09 RX ORDER — MYCOPHENOLATE MOFETIL 500 MG/1
500 TABLET, FILM COATED ORAL 2 TIMES DAILY
Qty: 60 TABLET | Refills: 11 | Status: SHIPPED | OUTPATIENT
Start: 2018-03-09 | End: 2019-02-04

## 2018-03-13 ENCOUNTER — OFFICE VISIT (OUTPATIENT)
Dept: NEPHROLOGY | Facility: CLINIC | Age: 58
End: 2018-03-13
Attending: INTERNAL MEDICINE
Payer: MEDICARE

## 2018-03-13 VITALS
HEIGHT: 71 IN | HEART RATE: 91 BPM | OXYGEN SATURATION: 96 % | BODY MASS INDEX: 26.74 KG/M2 | WEIGHT: 191 LBS | SYSTOLIC BLOOD PRESSURE: 151 MMHG | TEMPERATURE: 98.5 F | DIASTOLIC BLOOD PRESSURE: 86 MMHG

## 2018-03-13 DIAGNOSIS — I10 BENIGN ESSENTIAL HYPERTENSION: ICD-10-CM

## 2018-03-13 DIAGNOSIS — Z94.83 PANCREAS REPLACED BY TRANSPLANT (H): ICD-10-CM

## 2018-03-13 DIAGNOSIS — R35.0 URINARY FREQUENCY: ICD-10-CM

## 2018-03-13 DIAGNOSIS — Z94.0 STATUS POST KIDNEY TRANSPLANT: Primary | ICD-10-CM

## 2018-03-13 DIAGNOSIS — D84.9 IMMUNOSUPPRESSION (H): ICD-10-CM

## 2018-03-13 DIAGNOSIS — E78.2 MIXED HYPERLIPIDEMIA: ICD-10-CM

## 2018-03-13 LAB
ALBUMIN UR-MCNC: 100 MG/DL
APPEARANCE UR: ABNORMAL
BACTERIA #/AREA URNS HPF: ABNORMAL /HPF
BILIRUB UR QL STRIP: NEGATIVE
COLOR UR AUTO: YELLOW
GLUCOSE UR STRIP-MCNC: NEGATIVE MG/DL
HGB UR QL STRIP: ABNORMAL
KETONES UR STRIP-MCNC: NEGATIVE MG/DL
LEUKOCYTE ESTERASE UR QL STRIP: ABNORMAL
MUCOUS THREADS #/AREA URNS LPF: PRESENT /LPF
NITRATE UR QL: NEGATIVE
PH UR STRIP: 6 PH (ref 5–7)
RBC #/AREA URNS AUTO: 24 /HPF (ref 0–2)
SOURCE: ABNORMAL
SP GR UR STRIP: 1.01 (ref 1–1.03)
UROBILINOGEN UR STRIP-MCNC: 0 MG/DL (ref 0–2)
WBC #/AREA URNS AUTO: >182 /HPF (ref 0–5)
WBC CLUMPS #/AREA URNS HPF: PRESENT /HPF

## 2018-03-13 PROCEDURE — 87088 URINE BACTERIA CULTURE: CPT | Performed by: INTERNAL MEDICINE

## 2018-03-13 PROCEDURE — 81001 URINALYSIS AUTO W/SCOPE: CPT | Performed by: INTERNAL MEDICINE

## 2018-03-13 PROCEDURE — 87186 SC STD MICRODIL/AGAR DIL: CPT | Performed by: INTERNAL MEDICINE

## 2018-03-13 PROCEDURE — G0463 HOSPITAL OUTPT CLINIC VISIT: HCPCS | Mod: ZF

## 2018-03-13 PROCEDURE — 87086 URINE CULTURE/COLONY COUNT: CPT | Performed by: INTERNAL MEDICINE

## 2018-03-13 ASSESSMENT — PAIN SCALES - GENERAL: PAINLEVEL: NO PAIN (0)

## 2018-03-13 NOTE — PROGRESS NOTES
Assessment and Plan:  1. SABINE -the patient currently has excellent allograft function.  His most recent creatinine is 0.85 mg/dL and stable.  From a pancreas perspective he does not check his blood sugars at home, but fasting sugar on most recent check in February was 90 mg/dL. his last hemoglobin A1c was checked in January 2016 and was 4.9 g percent.    The patient's amylase on last check in February was 61.  This is ranged between 48 and 72 over the last 7 years.    Is 146 on last check and this has ranged between 132-197 over the last few years.    From an immunosuppression standpoint he is currently on tacrolimus 1.5 mg p.o. twice daily.  His last Prograf trough was 7.5 ng/mL.  He is currently on CellCept 500 mg p.o. twice daily.  2.  Immunosuppression: See above.  No changes were made today.  3.  Urinary frequency: We will send a urinalysis as well as urine culture to evaluate for urinary tract infection.  4.  Hypertension: The patient is currently on amlodipine 2.5 mg daily.  If his blood pressure is elevated greater than 140/90 consistently at home, we will increase his amlodipine to 5 mg p.o. nightly.  He will also need to check his cuff that he has at home against a cuff in a local clinic.  5.  Hyperlipidemia: The patient is currently on statin medicine and will continue this.  Goal LDL is ideally less than 70 but for sure less than 100.  6.  Need for skin cancer screening: The patient is currently on immunosuppression and will need to be followed by dermatology to evaluate for any potential skin cancers.  Assessment and plan was discussed with patient and he voiced his understanding and agreement.    Reason for Visit:  Mr. Tomlinson is here for routine follow up and sabine.    HPI:   Jose Alfredo Tomlinson is a 57 year old male with ESKD from Type 1 Diabetes and is status post SABINE on 10/29/2007.         Transplant Hx:       Tx: SABINE  Date: 10/29/07       Present Maintenance IS: Tacrolimus and Mycophenolate mofetil        Baseline Creatinine: 0.85 mg / dl       Recent DSA: No; last in 2017       Biopsy: No    The patient is a 57-year-old male with history of type 1 diabetes who is status post pancreas after kidney transplant in 2007.  The patient is currently maintained on immunosuppression with tacrolimus and CellCept.  X    The patient notes that he feels that he may be having onset of a urinary tract infection with urinary frequency.  As such we will send a urinalysis with urine culture today.    Other than urinary frequency he otherwise has no complaints.  Specifically he denies any chest pain or shortness of breath.  He has no nausea or vomiting.  He has no fever shakes or chills.  His bowel movements are regular.    Home BP: at goal.      ROS:   A comprehensive review of systems was obtained and negative, except as noted in the HPI or PMH.    Active Medical Problems:  Patient Active Problem List   Diagnosis     Pancreas transplanted (H)     S/P kidney transplant     History of diabetes mellitus, type I     Hyperlipidemia     Peripheral neuropathy     PAD (peripheral artery disease) (H)     Skin exam, screening for cancer     Seborrheic keratosis     Acquired perforating dermatosis     Cellulitis     Atrial bigeminy     Kidney replaced by transplant     Pancreas replaced by transplant (H)     Benign essential hypertension     Diabetic neuropathy, type I diabetes mellitus (H)     Right foot pain     Left foot pain     Tactile anesthesia     Personal history of diabetic foot ulcer     Right foot drop     Wrist drop, bilateral     Swelling of limb       Personal Hx:  Social History     Social History     Marital status:      Spouse name: N/A     Number of children: N/A     Years of education: N/A     Occupational History     Not on file.     Social History Main Topics     Smoking status: Never Smoker     Smokeless tobacco: Never Used     Alcohol use No     Drug use: No     Sexual activity: Yes     Partners: Female     Other  Topics Concern     Not on file     Social History Narrative    Lives alone        Allergies:  No Known Allergies    Medications:  Prior to Admission medications    Medication Sig Start Date End Date Taking? Authorizing Provider   CELLCEPT (BRAND) 500 MG TABLET Take 1 tablet (500 mg) by mouth 2 times daily 3/9/18  Yes Bryn Davidson MD   sulfamethoxazole-trimethoprim (BACTRIM) 400-80 MG per tablet Take 1 tablet by mouth twice a week 3/12/18  Yes Bryn Davidson MD   PROGRAF (BRAND) 0.5 MG CAPSULE Take 1 capsule (0.5 mg) by mouth 2 times daily (total dose 1.5 mg twice per day) 3/9/18  Yes Bryn Davidson MD   PROGRAF (BRAND) 1 MG CAPSULE Take 1 capsule (1 mg) by mouth 2 times daily (total dose 1.5 mg twice per day) 3/9/18  Yes Bryn Davidson MD   omeprazole (PRILOSEC) 20 MG CR capsule TAKE 1 CAPSULE BY MOUTH 2 TIMES DAILY 2/7/18  Yes Rosio Salas MD   amLODIPine (NORVASC) 2.5 MG tablet Take 1 tablet (2.5 mg) by mouth daily 2/7/18  Yes Rosio Salas MD   ondansetron (ZOFRAN-ODT) 4 MG ODT tab DISSOLVE 1 TABLET ON THE TONGUE EVERY 8 HOURS AS NEEDED FOR NAUSEA 2/7/18  Yes Rosio Salas MD   Multiple Vitamin (TAB-A-AMBER) TABS Take 1 tablet by mouth daily 1/11/18  Yes Rosio Salas MD   aspirin 81 MG EC tablet Take 1 tablet (81 mg) by mouth daily 1/11/18  Yes Rosio Salas MD   sodium bicarbonate 650 MG tablet TAKE ONE TABLET BY MOUTH THREE TIMES A DAY 1/10/18  Yes Bryn Davidson MD   order for DME Right carbon fiber AFO replacement due to fracture at posterior strut and due to age of brace greater than 5 years.  Patient requires AFOs for support and stability to enable safe ambulation 12/20/17  Yes Barbra Joseph MD   cholecalciferol (VITAMIN D) 400 UNITS tablet Take 1 tablet (400 Units) by mouth daily 12/15/17  Yes Rosio Salas MD   ascorbic acid (VITAMIN C) 500 MG tablet Take 2 tablets (1,000 mg) by mouth daily 9/21/17  Yes Rosio Salas MD  "  simvastatin (ZOCOR) 20 MG tablet Take 1 tablet (20 mg) by mouth At Bedtime 7/26/17  Yes Rosio Salas MD   omeprazole (PRILOSEC) 20 MG CR capsule Take 1 capsule (20 mg) by mouth 2 times daily 6/1/17  Yes Rosio Salas MD   simvastatin (ZOCOR) 20 MG tablet Take 1 tablet (20 mg) by mouth At Bedtime 2/13/17  Yes Rosio Salas MD   Cholecalciferol 400 UNITS CAPS Take 1 capsule by mouth daily 12/12/16  Yes Rosio Salas MD   multivitamin, therapeutic with minerals (MULTI-VITAMIN) TABS tablet Take 1 tablet by mouth daily 12/12/16  Yes Rosio Salas MD   omeprazole (PRILOSEC) 20 MG capsule Take 1 capsule (20 mg) by mouth 2 times daily Annual [facility] APPT. DUE JAN 2017 9/19/16  Yes Rosio Salas MD   order for DME Equipment being ordered: therapeutic shoes and insoles. For Peripheral neuropathy, diabetes type 1 and poor circulation 8/16/16  Yes Rosio Salas MD   aspirin 81 MG tablet Take 1 tablet (81 mg) by mouth daily 12/23/15  Yes Rosio Salas MD       Vitals:  /86 (BP Location: Right arm, Patient Position: Sitting, Cuff Size: Adult Large)  Pulse 91  Temp 98.5  F (36.9  C) (Oral)  Ht 1.803 m (5' 11\")  Wt 86.6 kg (191 lb)  SpO2 96%  BMI 26.64 kg/m2    Exam:   GENERAL APPEARANCE: alert and no distress  HENT: mouth without ulcers or lesions  LYMPHATICS: no cervical or supraclavicular nodes  RESP: lungs clear to auscultation - no rales, rhonchi or wheezes  CV: regular rhythm, normal rate, no rub, no murmur  EDEMA: no LE edema bilaterally  ABDOMEN: soft, nondistended, nontender, bowel sounds normal  MS: extremities normal - no gross deformities noted, no evidence of inflammation in joints, no muscle tenderness  SKIN: no rash    Results:   Labs reviewed with patient    "

## 2018-03-13 NOTE — NURSING NOTE
"Chief Complaint   Patient presents with     RECHECK     kidney TX       Initial /86 (BP Location: Right arm, Patient Position: Sitting, Cuff Size: Adult Large)  Pulse 91  Temp 98.5  F (36.9  C) (Oral)  Ht 1.803 m (5' 11\")  Wt 86.6 kg (191 lb)  SpO2 96%  BMI 26.64 kg/m2 Estimated body mass index is 26.64 kg/(m^2) as calculated from the following:    Height as of this encounter: 1.803 m (5' 11\").    Weight as of this encounter: 86.6 kg (191 lb).  Medication Reconciliation: complete     Maynor Booth MA    "

## 2018-03-13 NOTE — MR AVS SNAPSHOT
After Visit Summary   3/13/2018    Jose Alfredo Tomlinson    MRN: [id number]           Patient Information     Date Of Birth          1960        Visit Information        Provider Department      3/13/2018 4:00 PM Sourav Beavers MD [facility]        Today's Diagnoses     Status post kidney transplant    -  1    Urinary frequency        Pancreas replaced by transplant (H)        Immunosuppression (H)        Benign essential hypertension        Mixed hyperlipidemia           Follow-ups after your visit        Who to contact     If you have questions or need follow up information about today's clinic visit or your schedule please contact [facility] directly at [phone].  Normal or non-critical lab and imaging results will be communicated to you by [employer], letter or phone within 4 business days after the clinic has received the results. If you do not hear from us within 7 days, please contact the clinic through [employer] or phone. If you have a critical or abnormal lab result, we will notify you by phone as soon as possible.  Submit refill requests through [employer] or call your pharmacy and they will forward the refill request to us. Please allow 3 business days for your refill to be completed.          Additional Information About Your Visit        MyChart Information     [employer] gives you secure access to your electronic health record. If you see a primary care provider, you can also send messages to your care team and make appointments. If you have questions, please call your primary care clinic.  If you do not have a primary care provider, please call [phone] and they will assist you.        Care EveryWhere ID     This is your Care EveryWhere ID. This could be used by other organizations to access your [place] medical records  [id number]        Your Vitals Were     Pulse Temperature Height Pulse Oximetry BMI (Body Mass Index)       91 98.5  F (36.9  C) (Oral) 1.803 m (5'  "11\") 96% 26.64 kg/m2        Blood Pressure from Last 3 Encounters:   03/13/18 151/86   01/16/18 154/84   12/20/17 (!) 157/94    Weight from Last 3 Encounters:   03/13/18 86.6 kg (191 lb)   01/19/18 89.8 kg (198 lb)   01/16/18 89.8 kg (198 lb)               Primary Care Provider Office Phone # Fax #    PaxinosKirsten Salas [phone] [address] [address]        Equal Access to Services     [facility] : Hadii morenita escalante hadannamarieo Soemile, waaxda luqadaha, qaybta kaalmada aderober, clemencia santiago . So [facility] [phone].    ATENCIÓN: Si habla español, tiene a abel disposición servicios gratuitos de asistencia lingüística. [facility] [phone].    We comply with applicable federal civil rights laws and [place] laws. We do not discriminate on the basis of race, color, national origin, age, disability, sex, sexual orientation, or gender identity.            Thank you!     Thank you for choosing [facility]  for your care. Our goal is always to provide you with excellent care. Hearing back from our patients is one way we can continue to improve our services. Please take a few minutes to complete the written survey that you may receive in the mail after your visit with us. Thank you!             Your Updated Medication List - Protect others around you: Learn how to safely use, store and throw away your medicines at www.disposemymeds.org.          This list is accurate as of 3/13/18 11:59 PM.  Always use your most recent med list.                   Brand Name Dispense Instructions for use Diagnosis    amLODIPine 2.5 MG tablet    NORVASC    90 tablet    Take 1 tablet (2.5 mg) by mouth daily    Benign essential hypertension       ascorbic acid 500 MG tablet    VITAMIN C    180 tablet    Take 2 tablets (1,000 mg) by mouth daily    Pancreas transplanted (H), S/P kidney transplant, Peripheral neuropathy       * aspirin 81 MG tablet     100 tablet    " Take 1 tablet (81 mg) by mouth daily    Pancreas transplanted (H), S/P kidney transplant, Peripheral neuropathy       * aspirin 81 MG EC tablet     90 tablet    Take 1 tablet (81 mg) by mouth daily    S/P kidney transplant       * Cholecalciferol 400 UNITS Caps     90 capsule    Take 1 capsule by mouth daily    S/P kidney transplant       * cholecalciferol 400 UNITS tablet    Vitamin D    90 tablet    Take 1 tablet (400 Units) by mouth daily    S/P kidney transplant       multivitamin, therapeutic with minerals Tabs tablet     100 each    Take 1 tablet by mouth daily    Pancreas transplanted (H), S/P kidney transplant, Peripheral neuropathy       mycophenolate 500 MG tablet     60 tablet    Take 1 tablet (500 mg) by mouth 2 times daily    Kidney replaced by transplant, Pancreas replaced by transplant (H)       * omeprazole 20 MG CR capsule    priLOSEC    180 capsule    Take 1 capsule (20 mg) by mouth 2 times daily Annual [facility] APPT. DUE JAN 2017    Esophageal reflux       * omeprazole 20 MG CR capsule    priLOSEC    180 capsule    Take 1 capsule (20 mg) by mouth 2 times daily    Esophageal reflux       * omeprazole 20 MG CR capsule    priLOSEC    180 capsule    TAKE 1 CAPSULE BY MOUTH 2 TIMES DAILY    Esophageal reflux       ondansetron 4 MG ODT tab    ZOFRAN-ODT    10 tablet    DISSOLVE 1 TABLET ON THE TONGUE EVERY 8 HOURS AS NEEDED FOR NAUSEA    Non-intractable cyclical vomiting with nausea       * order for DME     1 Package    Equipment being ordered: therapeutic shoes and insoles. For Peripheral neuropathy, diabetes type 1 and poor circulation    Poor circulation, Peripheral neuropathy, Type 1 diabetes mellitus (H)       * order for DME     1 Units    Right carbon fiber AFO replacement due to fracture at posterior strut and due to age of brace greater than 5 years. Patient requires AFOs for support and stability to enable safe ambulation    Diabetic neuropathy, type I diabetes mellitus (H), Right foot  pain, Tactile anesthesia, Personal history of diabetic foot ulcer, Right foot drop       * simvastatin 20 MG tablet    ZOCOR    90 tablet    Take 1 tablet (20 mg) by mouth At Bedtime    Hypercholesteremia       * simvastatin 20 MG tablet    ZOCOR    90 tablet    Take 1 tablet (20 mg) by mouth At Bedtime    Hypercholesteremia       sodium bicarbonate 650 MG tablet     90 tablet    TAKE ONE TABLET BY MOUTH THREE TIMES A DAY    Kidney replaced by transplant, Pancreas replaced by transplant (H)       sulfamethoxazole-trimethoprim 400-80 MG per tablet    BACTRIM    10 tablet    Take 1 tablet by mouth twice a week    Kidney replaced by transplant, Pancreas replaced by transplant (H)       TAB-A-AMBER Tabs     90 tablet    Take 1 tablet by mouth daily    Non-intractable cyclical vomiting with nausea, S/P kidney transplant       * tacrolimus 0.5 MG capsule     60 capsule    Take 1 capsule (0.5 mg) by mouth 2 times daily (total dose 1.5 mg twice per day)    Kidney replaced by transplant, Pancreas replaced by transplant (H)       * tacrolimus 1 MG capsule     60 capsule    Take 1 capsule (1 mg) by mouth 2 times daily (total dose 1.5 mg twice per day)    Kidney replaced by transplant, Pancreas replaced by transplant (H)       * Notice:  This list has 13 medication(s) that are the same as other medications prescribed for you. Read the directions carefully, and ask your doctor or other care provider to review them with you.

## 2018-03-13 NOTE — LETTER
3/13/2018      RE: Jose Alfredo Tomlinson  [address] [place] [address] [address]     [place] [address]       Assessment and Plan:  1. SABINE -the patient currently has excellent allograft function.  His most recent creatinine is 0.85 mg/dL and stable.  From a pancreas perspective he does not check his blood sugars at home, but fasting sugar on most recent check in February was 90 mg/dL. his last hemoglobin A1c was checked in January 2016 and was 4.9 g percent.    The patient's amylase on last check in February was 61.  This is ranged between 48 and 72 over the last 7 years.    Is 146 on last check and this has ranged between 132-197 over the last few years.    From an immunosuppression standpoint he is currently on tacrolimus 1.5 mg p.o. twice daily.  His last Prograf trough was 7.5 ng/mL.  He is currently on CellCept 500 mg p.o. twice daily.  2.  Immunosuppression: See above.  No changes were made today.  3.  Urinary frequency: We will send a urinalysis as well as urine culture to evaluate for urinary tract infection.  4.  Hypertension: The patient is currently on amlodipine 2.5 mg daily.  If his blood pressure is elevated greater than 140/90 consistently at home, we will increase his amlodipine to 5 mg p.o. nightly.  He will also need to check his cuff that he has at home against a cuff in a local clinic.  5.  Hyperlipidemia: The patient is currently on statin medicine and will continue this.  Goal LDL is ideally less than 70 but for sure less than 100.  6.  Need for skin cancer screening: The patient is currently on immunosuppression and will need to be followed by dermatology to evaluate for any potential skin cancers.  Assessment and plan was discussed with patient and he voiced his understanding and agreement.    Reason for Visit:  Mr. Tomlinson is here for routine follow up and sabine.    HPI:   Jose Alfredo Tomlinson is a 57 year old male with ESKD from Type 1 Diabetes and is status post SABINE on 10/29/2007.         Transplant Hx:        Tx: SABINE  Date: 10/29/07       Present Maintenance IS: Tacrolimus and Mycophenolate mofetil       Baseline Creatinine: 0.85 mg / dl       Recent DSA: No; last in 2017       Biopsy: No    The patient is a 57-year-old male with history of type 1 diabetes who is status post pancreas after kidney transplant in 2007.  The patient is currently maintained on immunosuppression with tacrolimus and CellCept.  X    The patient notes that he feels that he may be having onset of a urinary tract infection with urinary frequency.  As such we will send a urinalysis with urine culture today.    Other than urinary frequency he otherwise has no complaints.  Specifically he denies any chest pain or shortness of breath.  He has no nausea or vomiting.  He has no fever shakes or chills.  His bowel movements are regular.    Home BP: at goal.      ROS:   A comprehensive review of systems was obtained and negative, except as noted in the HPI or PMH.    Active Medical Problems:  Patient Active Problem List   Diagnosis     Pancreas transplanted (H)     S/P kidney transplant     History of diabetes mellitus, type I     Hyperlipidemia     Peripheral neuropathy     PAD (peripheral artery disease) (H)     Skin exam, screening for cancer     Seborrheic keratosis     Acquired perforating dermatosis     Cellulitis     Atrial bigeminy     Kidney replaced by transplant     Pancreas replaced by transplant (H)     Benign essential hypertension     Diabetic neuropathy, type I diabetes mellitus (H)     Right foot pain     Left foot pain     Tactile anesthesia     Personal history of diabetic foot ulcer     Right foot drop     Wrist drop, bilateral     Swelling of limb       Personal Hx:  Social History     Social History     Marital status:      Spouse name: N/A     Number of children: N/A     Years of education: N/A     Occupational History     Not on file.     Social History Main Topics     Smoking status: Never Smoker     Smokeless tobacco:  Never Used     Alcohol use No     Drug use: No     Sexual activity: Yes     Partners: Female     Other Topics Concern     Not on file     Social History Narrative    Lives alone        Allergies:  No Known Allergies    Medications:  Prior to Admission medications    Medication Sig Start Date End Date Taking? Authorizing Provider   CELLCEPT (BRAND) 500 MG TABLET Take 1 tablet (500 mg) by mouth 2 times daily 3/9/18  Yes Bryn Davidson MD   sulfamethoxazole-trimethoprim (BACTRIM) 400-80 MG per tablet Take 1 tablet by mouth twice a week 3/12/18  Yes Bryn Davidson MD   PROGRAF (BRAND) 0.5 MG CAPSULE Take 1 capsule (0.5 mg) by mouth 2 times daily (total dose 1.5 mg twice per day) 3/9/18  Yes Bryn Davidson MD   PROGRAF (BRAND) 1 MG CAPSULE Take 1 capsule (1 mg) by mouth 2 times daily (total dose 1.5 mg twice per day) 3/9/18  Yes Bryn Davidson MD   omeprazole (PRILOSEC) 20 MG CR capsule TAKE 1 CAPSULE BY MOUTH 2 TIMES DAILY 2/7/18  Yes Rosio Salas MD   amLODIPine (NORVASC) 2.5 MG tablet Take 1 tablet (2.5 mg) by mouth daily 2/7/18  Yes Rosio Salas MD   ondansetron (ZOFRAN-ODT) 4 MG ODT tab DISSOLVE 1 TABLET ON THE TONGUE EVERY 8 HOURS AS NEEDED FOR NAUSEA 2/7/18  Yes Rosio Salas MD   Multiple Vitamin (TAB-A-AMBER) TABS Take 1 tablet by mouth daily 1/11/18  Yes Rosio Salas MD   aspirin 81 MG EC tablet Take 1 tablet (81 mg) by mouth daily 1/11/18  Yes Rosio Salas MD   sodium bicarbonate 650 MG tablet TAKE ONE TABLET BY MOUTH THREE TIMES A DAY 1/10/18  Yes Bryn Davidson MD   order for DME Right carbon fiber AFO replacement due to fracture at posterior strut and due to age of brace greater than 5 years.  Patient requires AFOs for support and stability to enable safe ambulation 12/20/17  Yes Barbra Joseph MD   cholecalciferol (VITAMIN D) 400 UNITS tablet Take 1 tablet (400 Units) by mouth daily 12/15/17  Yes Rosio Salas MD   ascorbic acid (VITAMIN  "C) 500 MG tablet Take 2 tablets (1,000 mg) by mouth daily 9/21/17  Yes Rosio Salas MD   simvastatin (ZOCOR) 20 MG tablet Take 1 tablet (20 mg) by mouth At Bedtime 7/26/17  Yes Rosio Salas MD   omeprazole (PRILOSEC) 20 MG CR capsule Take 1 capsule (20 mg) by mouth 2 times daily 6/1/17  Yes Rosio Salas MD   simvastatin (ZOCOR) 20 MG tablet Take 1 tablet (20 mg) by mouth At Bedtime 2/13/17  Yes Rosio Salas MD   Cholecalciferol 400 UNITS CAPS Take 1 capsule by mouth daily 12/12/16  Yes Rosio Salas MD   multivitamin, therapeutic with minerals (MULTI-VITAMIN) TABS tablet Take 1 tablet by mouth daily 12/12/16  Yes Rosio Salas MD   omeprazole (PRILOSEC) 20 MG capsule Take 1 capsule (20 mg) by mouth 2 times daily Annual KRISTIAN [facility]ASHINGTON APPT. DUE JAN 2017 9/19/16  Yes Rosio Salas MD   order for DME Equipment being ordered: therapeutic shoes and insoles. For Peripheral neuropathy, diabetes type 1 and poor circulation 8/16/16  Yes Rosio Salas MD   aspirin 81 MG tablet Take 1 tablet (81 mg) by mouth daily 12/23/15  Yes Rosio Salas MD       Vitals:  /86 (BP Location: Right arm, Patient Position: Sitting, Cuff Size: Adult Large)  Pulse 91  Temp 98.5  F (36.9  C) (Oral)  Ht 1.803 m (5' 11\")  Wt 86.6 kg (191 lb)  SpO2 96%  BMI 26.64 kg/m2    Exam:   GENERAL APPEARANCE: alert and no distress  HENT: mouth without ulcers or lesions  LYMPHATICS: no cervical or supraclavicular nodes  RESP: lungs clear to auscultation - no rales, rhonchi or wheezes  CV: regular rhythm, normal rate, no rub, no murmur  EDEMA: no LE edema bilaterally  ABDOMEN: soft, nondistended, nontender, bowel sounds normal  MS: extremities normal - no gross deformities noted, no evidence of inflammation in joints, no muscle tenderness  SKIN: no rash    Results:   Labs reviewed with patient      Sourav Beavers MD      "

## 2018-03-13 NOTE — PROGRESS NOTES
Assessment and Plan:  1. SAIBNE - ESRD due to DMI on tac, mmf. Tac last 7.5.     UPC:  BK:  DSA:    Baseline creatinine 0.8.    amlyase and lipase stable.     Glucose fasting less than 100 mg / dl.     2. Immunosuppression: See above.     3. Urine frequency: check UA with culture.    4.     Assessment and plan was discussed with patient and he voiced his understanding and agreement.    Reason for Visit:  Mr. Tomlinson is here for routine follow up and kidney transplant.    HPI:   Jose Alfredo Tomlinson is a 57 year old male with ESKD from Type 1 Diabetes and is status post SPK on 10/29/2007.         Transplant Hx:       Tx: SPK  Date: 10/29/2007       Present Maintenance IS: Tacrolimus and Mycophenolate mofetil       Baseline Creatinine: 0.8-1mg / dl       Recent DSA: No         Biopsy: No        Home BP: at goal.      ROS:   A comprehensive review of systems was obtained and negative, except as noted in the HPI or PMH.    Active Medical Problems:  Patient Active Problem List   Diagnosis     Pancreas transplanted (H)     S/P kidney transplant     History of diabetes mellitus, type I     Hyperlipidemia     Peripheral neuropathy     PAD (peripheral artery disease) (H)     Skin exam, screening for cancer     Seborrheic keratosis     Acquired perforating dermatosis     Cellulitis     Atrial bigeminy     Kidney replaced by transplant     Pancreas replaced by transplant (H)     Benign essential hypertension     Diabetic neuropathy, type I diabetes mellitus (H)     Right foot pain     Left foot pain     Tactile anesthesia     Personal history of diabetic foot ulcer     Right foot drop     Wrist drop, bilateral     Swelling of limb       Personal Hx:  Social History     Social History     Marital status:      Spouse name: N/A     Number of children: N/A     Years of education: N/A     Occupational History     Not on file.     Social History Main Topics     Smoking status: Never Smoker     Smokeless tobacco: Never Used      Alcohol use No     Drug use: No     Sexual activity: Yes     Partners: Female     Other Topics Concern     Not on file     Social History Narrative    Lives alone      Allergies:  No Known Allergies    Medications:  Prior to Admission medications    Medication Sig Start Date End Date Taking? Authorizing Provider   CELLCEPT (BRAND) 500 MG TABLET Take 1 tablet (500 mg) by mouth 2 times daily 3/9/18  Yes Bryn Davidson MD   sulfamethoxazole-trimethoprim (BACTRIM) 400-80 MG per tablet Take 1 tablet by mouth twice a week 3/12/18  Yes Bryn Davidson MD   PROGRAF (BRAND) 0.5 MG CAPSULE Take 1 capsule (0.5 mg) by mouth 2 times daily (total dose 1.5 mg twice per day) 3/9/18  Yes Bryn Davidson MD   PROGRAF (BRAND) 1 MG CAPSULE Take 1 capsule (1 mg) by mouth 2 times daily (total dose 1.5 mg twice per day) 3/9/18  Yes Bryn Davidson MD   omeprazole (PRILOSEC) 20 MG CR capsule TAKE 1 CAPSULE BY MOUTH 2 TIMES DAILY 2/7/18  Yes Rosio Salas MD   amLODIPine (NORVASC) 2.5 MG tablet Take 1 tablet (2.5 mg) by mouth daily 2/7/18  Yes Rosio Salas MD   ondansetron (ZOFRAN-ODT) 4 MG ODT tab DISSOLVE 1 TABLET ON THE TONGUE EVERY 8 HOURS AS NEEDED FOR NAUSEA 2/7/18  Yes Rosio Salas MD   Multiple Vitamin (TAB-A-AMBER) TABS Take 1 tablet by mouth daily 1/11/18  Yes Rosio Salas MD   aspirin 81 MG EC tablet Take 1 tablet (81 mg) by mouth daily 1/11/18  Yes Rosio Salas MD   sodium bicarbonate 650 MG tablet TAKE ONE TABLET BY MOUTH THREE TIMES A DAY 1/10/18  Yes Bryn Davidson MD   order for DME Right carbon fiber AFO replacement due to fracture at posterior strut and due to age of brace greater than 5 years.  Patient requires AFOs for support and stability to enable safe ambulation 12/20/17  Yes Barbra Joseph MD   cholecalciferol (VITAMIN D) 400 UNITS tablet Take 1 tablet (400 Units) by mouth daily 12/15/17  Yes Rosio Salas MD   ascorbic acid (VITAMIN C) 500 MG tablet  "Take 2 tablets (1,000 mg) by mouth daily 9/21/17  Yes Rosio Salas MD   simvastatin (ZOCOR) 20 MG tablet Take 1 tablet (20 mg) by mouth At Bedtime 7/26/17  Yes Rosio Salas MD   omeprazole (PRILOSEC) 20 MG CR capsule Take 1 capsule (20 mg) by mouth 2 times daily 6/1/17  Yes Rosio Salas MD   simvastatin (ZOCOR) 20 MG tablet Take 1 tablet (20 mg) by mouth At Bedtime 2/13/17  Yes Rosio Salas MD   Cholecalciferol 400 UNITS CAPS Take 1 capsule by mouth daily 12/12/16  Yes Rosio Salas MD   multivitamin, therapeutic with minerals (MULTI-VITAMIN) TABS tablet Take 1 tablet by mouth daily 12/12/16  Yes Rosio Salas MD   omeprazole (PRILOSEC) 20 MG capsule Take 1 capsule (20 mg) by mouth 2 times daily Annual KRISTIAN [facility]ASHINGTON APPT. DUE JAN 2017 9/19/16  Yes Rosio Salas MD   order for DME Equipment being ordered: therapeutic shoes and insoles. For Peripheral neuropathy, diabetes type 1 and poor circulation 8/16/16  Yes Rosio Salas MD   aspirin 81 MG tablet Take 1 tablet (81 mg) by mouth daily 12/23/15  Yes Rosio Salas MD     Vitals:  /86 (BP Location: Right arm, Patient Position: Sitting, Cuff Size: Adult Large)  Pulse 91  Temp 98.5  F (36.9  C) (Oral)  Ht 1.803 m (5' 11\")  Wt 86.6 kg (191 lb)  SpO2 96%  BMI 26.64 kg/m2    Exam:   GENERAL APPEARANCE: alert and no distress  HENT: mouth without ulcers or lesions  LYMPHATICS: no cervical or supraclavicular nodes  RESP: lungs clear to auscultation - no rales, rhonchi or wheezes  CV: regular rhythm, normal rate, no rub, no murmur  EDEMA: no LE edema bilaterally  ABDOMEN: soft, nondistended, nontender, bowel sounds normal  MS: extremities normal - no gross deformities noted, no evidence of inflammation in joints, no muscle tenderness  SKIN: no rash    Results:   Labs reviewed with patient.     "

## 2018-03-14 DIAGNOSIS — N39.0 URINARY TRACT INFECTION WITHOUT HEMATURIA, SITE UNSPECIFIED: Primary | ICD-10-CM

## 2018-03-14 RX ORDER — CIPROFLOXACIN 500 MG/1
500 TABLET, FILM COATED ORAL 2 TIMES DAILY
Qty: 28 TABLET | Refills: 0 | Status: SHIPPED | OUTPATIENT
Start: 2018-03-14 | End: 2019-02-01

## 2018-03-16 LAB
BACTERIA SPEC CULT: ABNORMAL
Lab: ABNORMAL
SPECIMEN SOURCE: ABNORMAL

## 2018-04-02 ENCOUNTER — TELEPHONE (OUTPATIENT)
Dept: NEPHROLOGY | Facility: CLINIC | Age: 58
End: 2018-04-02

## 2018-04-02 DIAGNOSIS — R30.0 DYSURIA: Primary | ICD-10-CM

## 2018-04-02 NOTE — TELEPHONE ENCOUNTER
"S-(situation): Jose Alfredo wanting to come in for UA and blood work today.    B-(background): Treated for UTI per Dr. Beavers on 3/13/18, took Bactrim x 1 week.  S/p kidney transplant.    A-(assessment): Does report improved symptoms for \"a couple of days', now having urinary urgency, and voiding in smaller amounts, urine darker than normal.  No fever, no back pain, no nausea.  Does report feeling \"blah\" for past two days.      R-(recommendations): Wondering if he can come in for OV , or if unable to see provider today, if he can come in for labs ?  Please f/u with Jose Alfredo.        Thanks  Lakisha Shah RN  FNA    "

## 2018-04-02 NOTE — TELEPHONE ENCOUNTER
"[facility] Call Center    Phone Message    May a detailed message be left on voicemail: yes    Reason for Call: Symptoms or Concerns     If patient has red-flag symptoms, warm transfer to triage line    Current symptom or concern: Bladder infection; pt f/u on his request from this morning    Symptoms have been present for:  \"a couple\" day(s)    Has patient previously been seen for this? Yes    By Dr. Sourav Beavers:     Date: 3/13/2018    Are there any new or worsening symptoms? Yes: Increasing discomfort. Requesting update; pt states he could be seen in [facility], if provider wants to order labs.      Action Taken: Message routed to:  [facility]      "

## 2018-04-03 NOTE — TELEPHONE ENCOUNTER
Reviewed with Dr. Beavers, who advised patient have a UA/UC done. Left detailed voicemail updating patient.    Macrina Andre RN

## 2018-04-04 DIAGNOSIS — E78.00 HYPERCHOLESTEREMIA: ICD-10-CM

## 2018-04-05 DIAGNOSIS — Z94.83 PANCREAS REPLACED BY TRANSPLANT (H): ICD-10-CM

## 2018-04-05 DIAGNOSIS — Z94.0 KIDNEY REPLACED BY TRANSPLANT: ICD-10-CM

## 2018-04-05 DIAGNOSIS — R30.0 DYSURIA: ICD-10-CM

## 2018-04-05 LAB
ALBUMIN UR-MCNC: 100 MG/DL
AMYLASE SERPL-CCNC: 67 U/L (ref 30–110)
ANION GAP SERPL CALCULATED.3IONS-SCNC: 8 MMOL/L (ref 3–14)
APPEARANCE UR: ABNORMAL
BILIRUB UR QL STRIP: NEGATIVE
BUN SERPL-MCNC: 38 MG/DL (ref 7–30)
CALCIUM SERPL-MCNC: 8.7 MG/DL (ref 8.5–10.1)
CHLORIDE SERPL-SCNC: 114 MMOL/L (ref 94–109)
CO2 SERPL-SCNC: 20 MMOL/L (ref 20–32)
COLOR UR AUTO: ABNORMAL
CREAT SERPL-MCNC: 0.96 MG/DL (ref 0.66–1.25)
ERYTHROCYTE [DISTWIDTH] IN BLOOD BY AUTOMATED COUNT: 13.2 % (ref 10–15)
GFR SERPL CREATININE-BSD FRML MDRD: 81 ML/MIN/1.7M2
GLUCOSE SERPL-MCNC: 80 MG/DL (ref 70–99)
GLUCOSE UR STRIP-MCNC: NEGATIVE MG/DL
HCT VFR BLD AUTO: 40.6 % (ref 40–53)
HGB BLD-MCNC: 13.3 G/DL (ref 13.3–17.7)
HGB UR QL STRIP: ABNORMAL
KETONES UR STRIP-MCNC: NEGATIVE MG/DL
LEUKOCYTE ESTERASE UR QL STRIP: ABNORMAL
LIPASE SERPL-CCNC: 263 U/L (ref 73–393)
MCH RBC QN AUTO: 29.2 PG (ref 26.5–33)
MCHC RBC AUTO-ENTMCNC: 32.8 G/DL (ref 31.5–36.5)
MCV RBC AUTO: 89 FL (ref 78–100)
MUCOUS THREADS #/AREA URNS LPF: PRESENT /LPF
NITRATE UR QL: NEGATIVE
PH UR STRIP: 8 PH (ref 5–7)
PLATELET # BLD AUTO: 139 10E9/L (ref 150–450)
POTASSIUM SERPL-SCNC: 4.4 MMOL/L (ref 3.4–5.3)
RBC # BLD AUTO: 4.55 10E12/L (ref 4.4–5.9)
RBC #/AREA URNS AUTO: >182 /HPF (ref 0–2)
SODIUM SERPL-SCNC: 142 MMOL/L (ref 133–144)
SOURCE: ABNORMAL
SP GR UR STRIP: 1.01 (ref 1–1.03)
UROBILINOGEN UR STRIP-MCNC: 0 MG/DL (ref 0–2)
WBC # BLD AUTO: 6.5 10E9/L (ref 4–11)
WBC #/AREA URNS AUTO: 13 /HPF (ref 0–5)

## 2018-04-05 PROCEDURE — 87086 URINE CULTURE/COLONY COUNT: CPT | Performed by: INTERNAL MEDICINE

## 2018-04-05 RX ORDER — SIMVASTATIN 20 MG
20 TABLET ORAL AT BEDTIME
Qty: 30 TABLET | Refills: 0 | Status: ON HOLD | OUTPATIENT
Start: 2018-04-05 | End: 2019-03-15

## 2018-04-05 NOTE — TELEPHONE ENCOUNTER
simvastatin (ZOCOR) 20 MG tablet  Last Written Prescription Date:  7/26/17  Last Fill Quantity: 90   # refills: 2  Last Office Visit : 1/25/16  Future Office visit: none      Routing  Because: LDL

## 2018-04-06 LAB
BACTERIA SPEC CULT: NO GROWTH
Lab: NORMAL
SPECIMEN SOURCE: NORMAL

## 2018-04-13 ENCOUNTER — TELEPHONE (OUTPATIENT)
Dept: TRANSPLANT | Facility: CLINIC | Age: 58
End: 2018-04-13

## 2018-04-13 DIAGNOSIS — Z94.0 KIDNEY REPLACED BY TRANSPLANT: ICD-10-CM

## 2018-04-13 DIAGNOSIS — Z94.83 PANCREAS TRANSPLANTED (H): Primary | ICD-10-CM

## 2018-04-13 NOTE — TELEPHONE ENCOUNTER
ISSUE:  Sourav Beavers MD [place], Whitney, RN                   He has culture negative hematuria / leukocyturia. Can we send to urology for urinary frequency, burning now with negative urine culture?     Thanks,     d       PLAN:   Message left for pt: message sent to scheduling.

## 2018-04-18 ENCOUNTER — PRE VISIT (OUTPATIENT)
Dept: UROLOGY | Facility: CLINIC | Age: 58
End: 2018-04-18

## 2018-04-18 NOTE — TELEPHONE ENCOUNTER
MEDICAL RECORDS REQUEST   [facility]  Urology Clinic  [address] [place] [address]  PHONE: [phone]  Fax: [phone]        FUTURE VISIT INFORMATION                                                   Jose Alfredo Tomlinson, : 1960 scheduled for future visit at [facility]    APPOINTMENT INFORMATION:    Date: 05/15/2018    Provider:  MAUREEN CARLISLE    Reason for Visit/Diagnosis: FREQUENCY    REFERRAL INFORMATION:    Referring provider:  ISIDRA SUAREZ    Specialty: TRANSPLANT    Referring providers clinic:  [facility]    Clinic contact number:  [phone]    RECORDS REQUESTED FOR VISIT                                                     NOTES  STATUS/DETAILS   OFFICE NOTE from referring provider  yes   OFFICE NOTE from other specialist  yes   DISCHARGE SUMMARY from hospital  no   DISCHARGE REPORT from the ER  no   OPERATIVE REPORT  no   MEDICATION LIST  yes       PRE-VISIT CHECKLIST      Record collection complete Yes ALL RECORDS IN EPIC.. [facility]   Appointment appropriately scheduled           (right time/right provider) Yes   Joint diagnostic appointment coordinated correctly          (ensure right order & amount of time) Yes   MyChart activation Yes   Questionnaire complete If no, please explain IN PROCESS     Completed by: Griselda Rm

## 2018-04-24 ENCOUNTER — TELEPHONE (OUTPATIENT)
Dept: TRANSPLANT | Facility: CLINIC | Age: 58
End: 2018-04-24

## 2018-04-24 NOTE — TELEPHONE ENCOUNTER
Padmini Singh Whitney, RN                     Talked to pt   He said he would prefer to make his own appointment            Previous Messages       ----- Message -----      From: Fay Garcia RN      Sent: 4/13/2018  12:45 PM        To: Maye Tyler   Subject: urology referral                                 Appointment Request: Urology   Orders Placed: Yes   Patient Aware? Yes.   Physician Override Approved? N/A   Appointment Timeframe Requested: raul Garcia

## 2018-04-25 ENCOUNTER — PRE VISIT (OUTPATIENT)
Dept: UROLOGY | Facility: CLINIC | Age: 58
End: 2018-04-25

## 2018-05-04 ENCOUNTER — OFFICE VISIT (OUTPATIENT)
Dept: OPHTHALMOLOGY | Facility: CLINIC | Age: 58
End: 2018-05-04
Payer: MEDICARE

## 2018-05-04 DIAGNOSIS — B00.52 HERPES SIMPLEX KERATITIS OF RIGHT EYE: Primary | ICD-10-CM

## 2018-05-04 ASSESSMENT — VISUAL ACUITY
OD_SC+: -2
OS_SC: 20/40
OD_SC: 20/25
METHOD: SNELLEN - LINEAR

## 2018-05-04 ASSESSMENT — EXTERNAL EXAM - LEFT EYE: OS_EXAM: NORMAL

## 2018-05-04 ASSESSMENT — TONOMETRY
OD_IOP_MMHG: 14
IOP_METHOD: ICARE
OS_IOP_MMHG: 18

## 2018-05-04 ASSESSMENT — CONF VISUAL FIELD
OS_NORMAL: 1
OD_NORMAL: 1

## 2018-05-04 ASSESSMENT — EXTERNAL EXAM - RIGHT EYE: OD_EXAM: NORMAL

## 2018-05-04 ASSESSMENT — SLIT LAMP EXAM - LIDS
COMMENTS: NORMAL
COMMENTS: NORMAL

## 2018-05-04 NOTE — PROGRESS NOTES
Assessment/Plan  (B00.52) Herpes simplex keratitis of right eye  (primary encounter diagnosis)  Comment: New onset this morning  Plan: ganciclovir 0.15 % GEL        Discussed findings with patient. Patient to begin taking Zirgan 5x daily OD for next week. Patient should RTC early next week for evaluation (Tues or Wed ok if needed). If symptoms worsen, patient should contact clinic. Patient should use PFAT's PRN. Samples dispensed.       Complete documentation of historical and exam elements from today's encounter can  be found in the full encounter summary report (not reduplicated in this progress  note). I personally obtained the chief complaint(s) and history of present illness. I  confirmed and edited as necessary the review of systems, past medical/surgical  history, family history, social history, and examination findings as documented by  others; and I examined the patient myself. I personally reviewed the relevant tests,  images, and reports as documented above. I formulated and edited as necessary the  assessment and plan and discussed the findings and management plan with the  patient and family.    Marty Cornell, OD

## 2018-05-04 NOTE — MR AVS SNAPSHOT
After Visit Summary   5/4/2018    Jose Alfredo Tomlinson    MRN: [id number]           Patient Information     Date Of Birth          1960        Visit Information        Provider Department      5/4/2018 10:00 AM Marty Cornell, ANUP M [facility] Ophthalmology        Today's Diagnoses     Herpes simplex keratitis of right eye    -  1       Follow-ups after your visit        Who to contact     Please call your clinic at [phone] to:    Ask questions about your health    Make or cancel appointments    Discuss your medicines    Learn about your test results    Speak to your doctor            Additional Information About Your Visit        MyChart Information     [employer] gives you secure access to your electronic health record. If you see a primary care provider, you can also send messages to your care team and make appointments. If you have questions, please call your primary care clinic.  If you do not have a primary care provider, please call [phone] and they will assist you.      [employer] is an electronic gateway that provides easy, online access to your medical records. With [employer], you can request a clinic appointment, read your test results, renew a prescription or communicate with your care team.     To access your existing account, please contact your [facility] or call [phone] for assistance.        Care EveryWhere ID     This is your Care EveryWhere ID. This could be used by other organizations to access your [facility] medical records  [id number]         Blood Pressure from Last 3 Encounters:   03/13/18 151/86   01/16/18 154/84   12/20/17 (!) 157/94    Weight from Last 3 Encounters:   03/13/18 86.6 kg (191 lb)   01/19/18 89.8 kg (198 lb)   01/16/18 89.8 kg (198 lb)              Today, you had the following     No orders found for display         Today's Medication Changes          These changes are accurate as of 5/4/18 11:50 AM.  If you have any  questions, ask your nurse or doctor.               Start taking these medicines.        Dose/Directions    ganciclovir 0.15 % Gel   Used for:  Herpes simplex keratitis of right eye   Started by:  Marty Cornell, OD        Use one drop in right eye 5 times each day for 7 days.   Quantity:  1 Tube   Refills:  1            Where to get your medicines      These medications were sent to [place], [place] - [address]  [address], [facility] [address]    Hours:  TRANSPLANT PHONE NUMBER [phone] Phone:  [phone]     ganciclovir 0.15 % Gel                Primary Care Provider Office Phone # Fax #    Rosio Salas -[phone]       [address] [facility] [address]        Equal Access to Services     MARY URIBE : Charanjit escalante hadasho Soemile, waaxda luqadaha, qaybta kaalmada adegustavoyada, clemencia swift. So [facility] [phone].    ATENCIÓN: Si habla español, tiene a abel disposición servicios gratuitos de asistencia lingüística. Sang al [phone].    We comply with applicable federal civil rights laws and [place] laws. We do not discriminate on the basis of race, color, national origin, age, disability, sex, sexual orientation, or gender identity.            Thank you!     Thank you for choosing [facility] OPHTHALMOLOGY  for your care. Our goal is always to provide you with excellent care. Hearing back from our patients is one way we can continue to improve our services. Please take a few minutes to complete the written survey that you may receive in the mail after your visit with us. Thank you!             Your Updated Medication List - Protect others around you: Learn how to safely use, store and throw away your medicines at www.disposemymeds.org.          This list is accurate as of 5/4/18 11:50 AM.  Always use your most recent med list.                   Brand Name Dispense  Instructions for use Diagnosis    amLODIPine 2.5 MG tablet    NORVASC    90 tablet    Take 1 tablet (2.5 mg) by mouth daily    Benign essential hypertension       ascorbic acid 500 MG tablet    VITAMIN C    180 tablet    Take 2 tablets (1,000 mg) by mouth daily    Pancreas transplanted (H), S/P kidney transplant, Peripheral neuropathy       * aspirin 81 MG tablet     100 tablet    Take 1 tablet (81 mg) by mouth daily    Pancreas transplanted (H), S/P kidney transplant, Peripheral neuropathy       * aspirin 81 MG EC tablet     90 tablet    Take 1 tablet (81 mg) by mouth daily    S/P kidney transplant       * Cholecalciferol 400 units Caps     90 capsule    Take 1 capsule by mouth daily    S/P kidney transplant       * cholecalciferol 400 units tablet    Vitamin D    90 tablet    Take 1 tablet (400 Units) by mouth daily    S/P kidney transplant       ciprofloxacin 500 MG tablet    CIPRO    28 tablet    Take 1 tablet (500 mg) by mouth 2 times daily    Urinary tract infection without hematuria, site unspecified       ganciclovir 0.15 % Gel     1 Tube    Use one drop in right eye 5 times each day for 7 days.    Herpes simplex keratitis of right eye       multivitamin, therapeutic with minerals Tabs tablet     100 each    Take 1 tablet by mouth daily    Pancreas transplanted (H), S/P kidney transplant, Peripheral neuropathy       mycophenolate 500 MG tablet     60 tablet    Take 1 tablet (500 mg) by mouth 2 times daily    Kidney replaced by transplant, Pancreas replaced by transplant (H)       * omeprazole 20 MG CR capsule    priLOSEC    180 capsule    Take 1 capsule (20 mg) by mouth 2 times daily Annual LOGEAIS MD APPT. DUE JAN 2017    Esophageal reflux       * omeprazole 20 MG CR capsule    priLOSEC    180 capsule    Take 1 capsule (20 mg) by mouth 2 times daily    Esophageal reflux       * omeprazole 20 MG CR capsule    priLOSEC    180 capsule    TAKE 1 CAPSULE BY MOUTH 2 TIMES DAILY    Esophageal reflux        ondansetron 4 MG ODT tab    ZOFRAN-ODT    10 tablet    DISSOLVE 1 TABLET ON THE TONGUE EVERY 8 HOURS AS NEEDED FOR NAUSEA    Non-intractable cyclical vomiting with nausea       * order for DME     1 Package    Equipment being ordered: therapeutic shoes and insoles. For Peripheral neuropathy, diabetes type 1 and poor circulation    Poor circulation, Peripheral neuropathy, Type 1 diabetes mellitus (H)       * order for DME     1 Units    Right carbon fiber AFO replacement due to fracture at posterior strut and due to age of brace greater than 5 years. Patient requires AFOs for support and stability to enable safe ambulation    Diabetic neuropathy, type I diabetes mellitus (H), Right foot pain, Tactile anesthesia, Personal history of diabetic foot ulcer, Right foot drop       * simvastatin 20 MG tablet    ZOCOR    90 tablet    Take 1 tablet (20 mg) by mouth At Bedtime    Hypercholesteremia       * simvastatin 20 MG tablet    ZOCOR    90 tablet    Take 1 tablet (20 mg) by mouth At Bedtime    Hypercholesteremia       * simvastatin 20 MG tablet    ZOCOR    30 tablet    Take 1 tablet (20 mg) by mouth At Bedtime    Hypercholesteremia       sodium bicarbonate 650 MG tablet     90 tablet    TAKE ONE TABLET BY MOUTH THREE TIMES A DAY    Kidney replaced by transplant, Pancreas replaced by transplant (H)       sulfamethoxazole-trimethoprim 400-80 MG per tablet    BACTRIM    10 tablet    Take 1 tablet by mouth twice a week    Kidney replaced by transplant, Pancreas replaced by transplant (H)       TAB-A-AMBER Tabs     90 tablet    Take 1 tablet by mouth daily    Non-intractable cyclical vomiting with nausea, S/P kidney transplant       * tacrolimus 0.5 MG capsule     60 capsule    Take 1 capsule (0.5 mg) by mouth 2 times daily (total dose 1.5 mg twice per day)    Kidney replaced by transplant, Pancreas replaced by transplant (H)       * tacrolimus 1 MG capsule     60 capsule    Take 1 capsule (1 mg) by mouth 2 times daily (total  dose 1.5 mg twice per day)    Kidney replaced by transplant, Pancreas replaced by transplant (H)       * Notice:  This list has 14 medication(s) that are the same as other medications prescribed for you. Read the directions carefully, and ask your doctor or other care provider to review them with you.

## 2018-05-04 NOTE — NURSING NOTE
Chief Complaints and History of Present Illnesses   Patient presents with     Eye Pain Right Eye     HPI    Affected eye(s):  Right   Symptoms:     No blurred vision   Redness   Foreign body sensation (Comment: scratchy, gritty feeling in the RE)   Itching   Photophobia   Eye discharge         Do you have eye pain now?:  Yes   Location:  OD   Pain Level:  Moderate Pain (4)   Pain Duration:  1 day   Pain Frequency:  Constant   Pain Characteristics:  Stabbing      Comments:    Patient notes he used a visine drop this morning     Patient is DM type 1, had pancreas and kidney tx,   Lab Results       Component                Value               Date                       A1C                      4.9                 01/25/2016                 A1C                      4.9                 03/03/2015                 A1C                      4.8                 03/27/2007                 A1C                      5.7                 10/25/2006                Josee Bhat May 4, 2018 10:18 AM

## 2018-05-10 ENCOUNTER — TELEPHONE (OUTPATIENT)
Dept: OPHTHALMOLOGY | Facility: CLINIC | Age: 58
End: 2018-05-10

## 2018-05-31 DIAGNOSIS — Z94.0 S/P KIDNEY TRANSPLANT: ICD-10-CM

## 2018-06-02 RX ORDER — OMEGA-3S/DHA/EPA/FISH OIL/D3 300MG-1000
400 CAPSULE ORAL DAILY
Qty: 90 TABLET | Refills: 2 | Status: SHIPPED | OUTPATIENT
Start: 2018-06-02 | End: 2019-01-08

## 2018-07-24 DIAGNOSIS — R11.15 NON-INTRACTABLE CYCLICAL VOMITING WITH NAUSEA: ICD-10-CM

## 2018-07-25 RX ORDER — ONDANSETRON 4 MG/1
TABLET, ORALLY DISINTEGRATING ORAL
Qty: 10 TABLET | Refills: 5 | Status: SHIPPED | OUTPATIENT
Start: 2018-07-25 | End: 2019-04-02

## 2018-08-23 ENCOUNTER — HOSPITAL ENCOUNTER (EMERGENCY)
Facility: CLINIC | Age: 58
Discharge: HOME | End: 2018-08-23
Payer: MEDICARE

## 2018-08-23 ENCOUNTER — HOSPITAL ENCOUNTER (EMERGENCY)
Facility: CLINIC | Age: 58
Discharge: HOME | End: 2018-08-23
Attending: INTERNAL MEDICINE | Admitting: INTERNAL MEDICINE
Payer: MEDICARE

## 2018-08-23 ENCOUNTER — APPOINTMENT (OUTPATIENT)
Dept: GENERAL RADIOLOGY | Facility: CLINIC | Age: 58
End: 2018-08-23
Attending: INTERNAL MEDICINE
Payer: MEDICARE

## 2018-08-23 VITALS
RESPIRATION RATE: 16 BRPM | TEMPERATURE: 98.3 F | BODY MASS INDEX: 27.64 KG/M2 | DIASTOLIC BLOOD PRESSURE: 70 MMHG | HEART RATE: 73 BPM | OXYGEN SATURATION: 98 % | SYSTOLIC BLOOD PRESSURE: 173 MMHG | WEIGHT: 198.19 LBS

## 2018-08-23 DIAGNOSIS — M25.571 PAIN IN JOINT, ANKLE AND FOOT, RIGHT: Primary | ICD-10-CM

## 2018-08-23 PROCEDURE — 99283 EMERGENCY DEPT VISIT LOW MDM: CPT | Performed by: INTERNAL MEDICINE

## 2018-08-23 PROCEDURE — 73610 X-RAY EXAM OF ANKLE: CPT | Mod: RT

## 2018-08-23 PROCEDURE — 99283 EMERGENCY DEPT VISIT LOW MDM: CPT | Mod: GC | Performed by: INTERNAL MEDICINE

## 2018-08-23 RX ORDER — GABAPENTIN 300 MG/1
300 CAPSULE ORAL 3 TIMES DAILY
Qty: 90 CAPSULE | Refills: 1 | Status: ON HOLD | OUTPATIENT
Start: 2018-08-23 | End: 2019-03-15

## 2018-08-23 NOTE — DISCHARGE INSTRUCTIONS
Please follow-up with your regular doctor. Return if swelling, redness, increasing pain, pain with weight bearing or other new or worsening symptoms.   Your X-rays at the ED were negative for fracture or dislocation.    Please make an appointment to follow up with Your Primary Care Provider in the next week for follow-up.

## 2018-08-23 NOTE — ED TRIAGE NOTES
Pt presents ambulatory to triage from home. Pt states woke up this morning with right ankle pain. No known injury. No recent falls. Pt hx kidney and pancrease transplant 2010.

## 2018-08-23 NOTE — ED PROVIDER NOTES
"  History     Chief Complaint   Patient presents with     Ankle Pain     HPI  Jose Alfredo Tomlinson is a 58 year old male with complex past medical history including pancreatic-renal transplant, previous DM, peripheral neuropathy with foot drop, who presents with medial right ankle pain.   Patient woke up with intermittent stabbing pain at about midnight. Pain does not radiate, it is very severe when it \"shoots\", but has no pain between flares. No pain with weight bearing or moving ankle. Does not radiate. Has tried ice and heat, as well as topical lidocaine without improvement. It has kept him up all night. He does note that his right foot orthotics has been looser this last couple of days and somewhat moving up and down with ambulation.  Denies any previous trauma. No swelling, redness or heat. Has a new scab on the interior malleolus but no discharge and had not seen it before. No knee pain. No recent illness. No history of similar pain.    I have reviewed the Medications, Allergies, Past Medical and Surgical History, and Social History in the Epic system.    Review of Systems   ROS: 10 point ROS neg other than the symptoms noted above in the HPI.    Physical Exam   BP: 173/70  Pulse: 73  Temp: 98.3  F (36.8  C)  Resp: 16  Weight: 89.9 kg (198 lb 3.1 oz)  SpO2: 98 %    Physical Exam  General: Appears uncomfortable, speaking in full sentences  Eyes: Conjunctiva without injection or scleral icterus  ENT:  Moist mucus membranes, posterior oropharynx clear without erythema or exudates  Neck: supple, full ROM  Respiratory:  Lungs clear to auscultation bilaterally, no crackles/rubs/wheezes.  Good air movement  CV: Normal rate, regular rhythm, S1 and S2 present, no M/G/R  GI:  BS present, abdomen soft, non-tender, non-distended, no guarding or rebound tenderness  Skin: Warm, dry, well-perfused extremities, no open wounds appreciated  Musculoskeletal:   Upper and contralateral lower extremity without notable " deformities  Right Lower Extremity  Inspection: symmetric, has a scab anterior to interior malleolus, no erythema or rash  Palpation: no edema, no tenderness to palpation over the 5th metatarsal or over midfoot/navicular bones, nor with palpation of calcaneous   Range of Motion: passive normal, flexion and extension with decreased strength 2/2 known peripheral neuropathy.   Bear weights with no discomfort.   Special Tests:  Squeeze test (midshaft of tibia/fibula recreating ankle pain): negative  Syndesmotic stress test (dorsiflexion/external rotation worsening pain/laxity): negative  Neuro: Alert, 4/5 dorsiflexion on RLE, 3/5 plantar flexion, inversion and eversion. No sensation up to mid calf, symmetric.   Psychiatric: Normal affect, normal mood    ED Course     ED Interventions:  Medications - No data to display     ED Course:  I reviewed the patient's medical record.   The patient was seen and examined by myself. I discussed the course of care with the patient including diagnostic studies.    He understands and is agreeable to the plan.    Recheck. Patient with no pain currently    Procedures    XR Ankle Right G/E 3 Views   Final Result   Impression:   1. No acute osseous abnormality.   2. Mild soft tissue swelling overlying the lateral malleolus.      I have personally reviewed the examination and initial interpretation   and I agree with the findings.      JOHN HAM            Assessments & Plan (with Medical Decision Making)   MDM  Number of Diagnoses or Management Options  Pain in joint, ankle and foot, right:      Patient presents with right foot pain without history of trauma. No pain with weight bearing or ambulation, exam is unremarkable except for small scab on the medial ankle and chronic decreased sensation and strength, that is symmetric. No signs and symptoms of infection, no bony crepitus or local tenderness to suggest fracture, no deformity and no evidence of ligament injury.    Though low  risk of fracture with no previous trauma, given his decreased sensation, X-rays obtained, which were unremarkable except for some small bony proliferation of distal tip of medial malleolus likely from prior trauma. This is most likely related to his known peripheral neuropathy. Will treat with gabapentin and recommended f/u with regular doctor.     All questions were answered prior to discharge. Reasons for return as well as follow up were reviewed with the patient. He understands and agrees to this plan.    New Prescriptions    GABAPENTIN (NEURONTIN) 300 MG CAPSULE    Take 1 capsule (300 mg) by mouth 3 times daily    GABAPENTIN (NEURONTIN) 300 MG CAPSULE    Take 1 capsule (300 mg) by mouth 3 times daily       Final diagnoses:   Pain in joint, ankle and foot, right     8/23/2018   [facility], [place], EMERGENCY DEPARTMENT     Moriah Chiu MD  Resident  08/23/18 1006    This data collected with the Resident working in the Emergency Department.  Patient was seen and evaluated by myself and I repeated the history and physical exam with the patient.  The plan of care was discussed with them.  The key portions of the note including the entire assessment and plan reflect my documentation.          Colleen Mendoza MD  08/23/18 5445

## 2018-08-24 DIAGNOSIS — Z94.83 PANCREAS TRANSPLANTED (H): ICD-10-CM

## 2018-08-24 DIAGNOSIS — Z94.0 S/P KIDNEY TRANSPLANT: ICD-10-CM

## 2018-08-24 DIAGNOSIS — G62.9 PERIPHERAL NEUROPATHY: ICD-10-CM

## 2018-08-28 RX ORDER — ASCORBIC ACID 500 MG
1000 TABLET ORAL DAILY
Qty: 180 TABLET | Refills: 1 | Status: SHIPPED | OUTPATIENT
Start: 2018-08-28 | End: 2022-07-15

## 2018-11-27 ENCOUNTER — TRANSFERRED RECORDS (OUTPATIENT)
Dept: HEALTH INFORMATION MANAGEMENT | Facility: CLINIC | Age: 58
End: 2018-11-27

## 2018-12-11 ENCOUNTER — TELEPHONE (OUTPATIENT)
Dept: NEPHROLOGY | Facility: CLINIC | Age: 58
End: 2018-12-11

## 2018-12-11 DIAGNOSIS — Z94.0 KIDNEY REPLACED BY TRANSPLANT: ICD-10-CM

## 2018-12-11 DIAGNOSIS — R11.15 NON-INTRACTABLE CYCLICAL VOMITING WITH NAUSEA: ICD-10-CM

## 2018-12-11 DIAGNOSIS — Z94.83 PANCREAS REPLACED BY TRANSPLANT (H): ICD-10-CM

## 2018-12-11 DIAGNOSIS — Z94.0 S/P KIDNEY TRANSPLANT: ICD-10-CM

## 2018-12-11 RX ORDER — SODIUM BICARBONATE 650 MG/1
TABLET ORAL
Qty: 90 TABLET | Refills: 3 | Status: SHIPPED | OUTPATIENT
Start: 2018-12-11 | End: 2019-04-03

## 2018-12-11 NOTE — TELEPHONE ENCOUNTER
Last Office Visit with Nephrologist:  3/13/18.  Medication refilled per Nephrology Clinic protocol.     Macrina Andre RN

## 2018-12-14 ENCOUNTER — TELEPHONE (OUTPATIENT)
Dept: TRANSPLANT | Facility: CLINIC | Age: 58
End: 2018-12-14

## 2018-12-14 DIAGNOSIS — Z94.0 KIDNEY REPLACED BY TRANSPLANT: Primary | ICD-10-CM

## 2018-12-14 DIAGNOSIS — Z94.83 PANCREAS REPLACED BY TRANSPLANT (H): ICD-10-CM

## 2018-12-14 DIAGNOSIS — Z94.0 KIDNEY REPLACED BY TRANSPLANT: ICD-10-CM

## 2018-12-14 DIAGNOSIS — R80.9 PROTEINURIA: ICD-10-CM

## 2018-12-14 LAB
AMYLASE SERPL-CCNC: 70 U/L (ref 30–110)
ANION GAP SERPL CALCULATED.3IONS-SCNC: 10 MMOL/L (ref 3–14)
BUN SERPL-MCNC: 30 MG/DL (ref 7–30)
CALCIUM SERPL-MCNC: 9.9 MG/DL (ref 8.5–10.1)
CHLORIDE SERPL-SCNC: 110 MMOL/L (ref 94–109)
CO2 SERPL-SCNC: 20 MMOL/L (ref 20–32)
CREAT SERPL-MCNC: 0.83 MG/DL (ref 0.66–1.25)
CREAT UR-MCNC: 48 MG/DL
ERYTHROCYTE [DISTWIDTH] IN BLOOD BY AUTOMATED COUNT: 13.2 % (ref 10–15)
GFR SERPL CREATININE-BSD FRML MDRD: >90 ML/MIN/1.7M2
GLUCOSE SERPL-MCNC: 94 MG/DL (ref 70–99)
HCT VFR BLD AUTO: 43.2 % (ref 40–53)
HGB BLD-MCNC: 14.1 G/DL (ref 13.3–17.7)
LIPASE SERPL-CCNC: 221 U/L (ref 73–393)
MCH RBC QN AUTO: 28.5 PG (ref 26.5–33)
MCHC RBC AUTO-ENTMCNC: 32.6 G/DL (ref 31.5–36.5)
MCV RBC AUTO: 87 FL (ref 78–100)
PLATELET # BLD AUTO: 181 10E9/L (ref 150–450)
POTASSIUM SERPL-SCNC: 4.6 MMOL/L (ref 3.4–5.3)
PROT UR-MCNC: 0.92 G/L
PROT/CREAT 24H UR: 1.9 G/G CR (ref 0–0.2)
RBC # BLD AUTO: 4.95 10E12/L (ref 4.4–5.9)
SODIUM SERPL-SCNC: 140 MMOL/L (ref 133–144)
WBC # BLD AUTO: 7.4 10E9/L (ref 4–11)

## 2018-12-14 RX ORDER — MULTIVITAMIN WITH FOLIC ACID 400 MCG
TABLET ORAL
Qty: 90 TABLET | Refills: 0 | Status: SHIPPED | OUTPATIENT
Start: 2018-12-14 | End: 2019-03-05

## 2018-12-14 RX ORDER — ASPIRIN 81 MG/1
TABLET, DELAYED RELEASE ORAL
Qty: 90 TABLET | Refills: 0 | Status: SHIPPED | OUTPATIENT
Start: 2018-12-14 | End: 2019-03-05

## 2018-12-14 NOTE — TELEPHONE ENCOUNTER
Call placed to patient. No answer. Voice message left with instructions listed below. Will try back

## 2018-12-14 NOTE — TELEPHONE ENCOUNTER
ISSUE:  Urine protein elevated at 1.9    PLAN:  Call patient and see what his BP has been running.  Any recent illness or s/s of UTI?  Encourage good hydration and repeat urine protein in 1-2 weeks    LPN TASK:  Call with above instructions  Update lab orders

## 2018-12-17 NOTE — TELEPHONE ENCOUNTER
Call placed to patient. Patient states that he's not actively checking his BP h\e know that his BP has been elevated. Denies any s\s of UTI or illness just notes that he feels run down. Patient v\u to begin monitoring his BP daily, Improve hydration, Inform SOT if UTI symptoms occur and repeat level in 1-2 weeks. Orders placed.

## 2018-12-18 NOTE — TELEPHONE ENCOUNTER
Called and left v/m for Jose Alfredo to return call to sot office to discuss Dr. Beavers's recommendation to start Losartan.     Sourav Beavers MD Hasebroock, Rebecca, RN             Jose Alfredo has 1.9 g / g proteinuria on labs most recently checked.     Can we start losartan 25 mg daily and increase every 7 days to 100 mg daily and recheck labs on the 100 mg daily.     Goal bp is 100-130/60-80.     Low sodium diet of less than 2000 mg daily.     Thanks,     d

## 2018-12-19 RX ORDER — LOSARTAN POTASSIUM 25 MG/1
TABLET ORAL
Qty: 70 TABLET | Refills: 3 | Status: SHIPPED | OUTPATIENT
Start: 2018-12-19 | End: 2019-05-07

## 2018-12-19 NOTE — TELEPHONE ENCOUNTER
"Spoke with Jose Alfredo regarding Dr. Beavers's recommendation to start Losartan.  I explained to him that this medication will help lower his BP and decrease his proteinuria.    We discussed that he will start on 25 mg and increase the dose every 7 days by 25 mg until he reaches 100 mg and his final dose will be 100 mg daily.    I recommended he get a BMP after 2 weeks to monitor his potassium level as he is also on Bactrim.  He plans to repeat all labs including UPC in 4 weeks when he is on 100 mg daily.    Pt. states that he started monitoring his BP on Monday when we called and asked him to, but he doesn't remember the exact number, but states its been running \"high.\"  I asked that he start monitoring it daily.  I explained to him his goal is 100-130/60-80 and asked that he contact us if it is running lower that that.    I educated him on the signs of low blood pressure, and asked that he contact us if he is experiencing this.  Pt. verbalized understanding.     I told him I would have our nephrology RN check in on him over the next couple of days.    We also discussed the need for a low sodium diet, less than 2000 mg daily.  Pt. states he eats a lot of frozen meals as it is fast and convenient.  I educated him that these are high in sodium and recommend he limit these meals and focus more on whole grains, fruits, veggies, and lean meats.  Pt. verbalized understanding.   "

## 2018-12-27 ENCOUNTER — MYC MEDICAL ADVICE (OUTPATIENT)
Dept: NEPHROLOGY | Facility: CLINIC | Age: 58
End: 2018-12-27

## 2019-01-07 NOTE — TELEPHONE ENCOUNTER
"Spoke with patient. States he's up to 100mg of losartan daily. He said BP has been better, but still \"high\". Couldn't provide specific readings. He agreed to start a log and call / mychart the team with updates.    Macrina Andre RN  "

## 2019-01-08 DIAGNOSIS — K21.9 ESOPHAGEAL REFLUX: ICD-10-CM

## 2019-01-08 DIAGNOSIS — Z94.0 S/P KIDNEY TRANSPLANT: ICD-10-CM

## 2019-01-09 RX ORDER — OMEGA-3S/DHA/EPA/FISH OIL/D3 300MG-1000
CAPSULE ORAL
Qty: 90 TABLET | Refills: 0 | Status: SHIPPED | OUTPATIENT
Start: 2019-01-09 | End: 2021-01-28

## 2019-01-09 NOTE — TELEPHONE ENCOUNTER
omeprazole (PRILOSEC) 20 MG capsule   Last Written Prescription Date:  9/9/16  Last Fill Quantity: 180,   # refills: 1  Last Office Visit : 1/16/18  Future Office visit:  None scheduled    Routing refill request to RN for review/approval because:  High drug / drug interaction: Mycophenolate and omeprazole      Due for follow up.  Will authorized 90 day ihsan of Vit D.  Note to pharmacy and clinic coordinators regarding need for appointment.

## 2019-02-01 ENCOUNTER — OFFICE VISIT (OUTPATIENT)
Dept: INTERNAL MEDICINE | Facility: CLINIC | Age: 59
End: 2019-02-01
Payer: MEDICARE

## 2019-02-01 ENCOUNTER — TELEPHONE (OUTPATIENT)
Dept: GASTROENTEROLOGY | Facility: CLINIC | Age: 59
End: 2019-02-01

## 2019-02-01 VITALS
WEIGHT: 203 LBS | DIASTOLIC BLOOD PRESSURE: 79 MMHG | OXYGEN SATURATION: 100 % | BODY MASS INDEX: 28.31 KG/M2 | HEART RATE: 82 BPM | SYSTOLIC BLOOD PRESSURE: 134 MMHG

## 2019-02-01 DIAGNOSIS — Z12.11 COLON CANCER SCREENING: ICD-10-CM

## 2019-02-01 DIAGNOSIS — Z13.89 SCREENING FOR DIABETIC PERIPHERAL NEUROPATHY: ICD-10-CM

## 2019-02-01 DIAGNOSIS — Z94.0 KIDNEY REPLACED BY TRANSPLANT: ICD-10-CM

## 2019-02-01 DIAGNOSIS — E10.40 TYPE 1 DIABETES, CONTROLLED, WITH NEUROPATHY (H): ICD-10-CM

## 2019-02-01 DIAGNOSIS — Z94.83 PANCREAS REPLACED BY TRANSPLANT (H): ICD-10-CM

## 2019-02-01 DIAGNOSIS — Z23 NEED FOR VACCINATION: ICD-10-CM

## 2019-02-01 DIAGNOSIS — Z00.00 ROUTINE HISTORY AND PHYSICAL EXAMINATION OF ADULT: Primary | ICD-10-CM

## 2019-02-01 DIAGNOSIS — H02.402 PTOSIS OF LEFT EYELID: ICD-10-CM

## 2019-02-01 DIAGNOSIS — L60.8 TOENAIL DEFORMITY: ICD-10-CM

## 2019-02-01 DIAGNOSIS — S91.002A WOUND OF LEFT ANKLE, INITIAL ENCOUNTER: ICD-10-CM

## 2019-02-01 DIAGNOSIS — H53.8 BLURRED VISION: ICD-10-CM

## 2019-02-01 LAB
AMYLASE SERPL-CCNC: 60 U/L (ref 30–110)
ANION GAP SERPL CALCULATED.3IONS-SCNC: 8 MMOL/L (ref 3–14)
BUN SERPL-MCNC: 28 MG/DL (ref 7–30)
CALCIUM SERPL-MCNC: 8.8 MG/DL (ref 8.5–10.1)
CHLORIDE SERPL-SCNC: 113 MMOL/L (ref 94–109)
CHOLEST SERPL-MCNC: 164 MG/DL
CO2 SERPL-SCNC: 20 MMOL/L (ref 20–32)
CREAT SERPL-MCNC: 0.94 MG/DL (ref 0.66–1.25)
CREAT UR-MCNC: 94 MG/DL
ERYTHROCYTE [DISTWIDTH] IN BLOOD BY AUTOMATED COUNT: 13.5 % (ref 10–15)
GFR SERPL CREATININE-BSD FRML MDRD: 90 ML/MIN/{1.73_M2}
GLUCOSE SERPL-MCNC: 86 MG/DL (ref 70–99)
HBA1C MFR BLD: 4.9 % (ref 0–5.6)
HCT VFR BLD AUTO: 41.5 % (ref 40–53)
HDLC SERPL-MCNC: 44 MG/DL
HGB BLD-MCNC: 13.8 G/DL (ref 13.3–17.7)
LDLC SERPL CALC-MCNC: 104 MG/DL
LIPASE SERPL-CCNC: 123 U/L (ref 73–393)
MCH RBC QN AUTO: 28.4 PG (ref 26.5–33)
MCHC RBC AUTO-ENTMCNC: 33.3 G/DL (ref 31.5–36.5)
MCV RBC AUTO: 85 FL (ref 78–100)
NONHDLC SERPL-MCNC: 120 MG/DL
PLATELET # BLD AUTO: 168 10E9/L (ref 150–450)
POTASSIUM SERPL-SCNC: 4.3 MMOL/L (ref 3.4–5.3)
PROT UR-MCNC: 1.41 G/L
PROT/CREAT 24H UR: 1.5 G/G CR (ref 0–0.2)
RBC # BLD AUTO: 4.86 10E12/L (ref 4.4–5.9)
SODIUM SERPL-SCNC: 142 MMOL/L (ref 133–144)
TACROLIMUS BLD-MCNC: 11.9 UG/L (ref 5–15)
TME LAST DOSE: NORMAL H
TRIGL SERPL-MCNC: 82 MG/DL
WBC # BLD AUTO: 6.3 10E9/L (ref 4–11)

## 2019-02-01 PROCEDURE — 80197 ASSAY OF TACROLIMUS: CPT | Performed by: INTERNAL MEDICINE

## 2019-02-01 ASSESSMENT — PAIN SCALES - GENERAL: PAINLEVEL: NO PAIN (0)

## 2019-02-01 NOTE — PROGRESS NOTES
PRIMARY CARE CENTER      SUBJECTIVE:     Jose Alfredo Tomlinson is a 58 year old male with a PMHx of ESRD 2/2 DM I (s/p SABINE in 2007 on chronic immunosuppression with tacrolimus and CellCept), HTN, and peripheral neuropathy with right foot drop who comes in for annual physical exam.    Evaluated in the ER in August 2018 for pain in right ankle attributed to peripheral neuropathy that was treated with gabapentin. Otherwise, no interval ER visits or hospitalizations.     No acute complaints to address today. Does endorse some blurry vision of the left eye with irritation and associate ptosis with onset within the last 24 hours.      Routine health maintenance  Last colonoscopy in 2013 with recommendations for repeat colonoscopy in 5 years    Received flu vaccine for 4494-1539 flu season    Lipid panel within normal limits in 2016  HbA1c 4.9 in 2016  Urine ptn/Cr in December 2018 1.9         MEDICATIONS/ALLERGIES:     Medications and allergies reviewed by me today.      ROS:     Constitutional, HEENT, cardiovascular, pulmonary, gi and gu systems are negative, except as otherwise noted.     OBJECTIVE:     /79   Pulse 82   Wt 92.1 kg (203 lb)   SpO2 100%   BMI 28.31 kg/m     Wt Readings from Last 1 Encounters:   08/23/18 89.9 kg (198 lb 3.1 oz)       GENERAL APPEARANCE: healthy, alert and no distress     EYES: EOMI,  PERRL, mild ptosis and scleral injection on right     HENT: ear canals and TM's normal and mouth without ulcers or lesions     NECK: no asymmetry, masses, or scars and thyroid normal to palpation     RESP: lungs clear to auscultation bilaterally without rales, rhonchi or wheezes     CV: regular rate and rhythm, normal S1 S2, no murmur, click or rub     ABDOMEN:  Well-healed abdominal scars, abdomen soft, nontender, bowel sounds normal     MS: multiple wounds over the shins, bilateral toenail deformity     SKIN: no suspicious lesions or rashes     NEURO: diminished hearing bilaterally,  CN otherwise intact bilaterally, diminished sensation to filament test bilaterally to the midshin, mentation intact and speech normal     PSYCH: affect appropriate       ASSESSMENT/PLAN:     Jose Alfredo was seen today for physical.    Diagnoses and all orders for this visit:    # Routine history and physical examination of adult    # Type 1 diabetes, controlled, with neuropathy (H)  # Kidney replaced by transplant  # Screening for diabetic peripheral neuropathy  -     Lipid Profile FASTING; Future  -     HEMOGLOBIN A1C -(Today); Future  -     FOOT EXAM - NO CHARGE  -     PODIATRY/FOOT & ANKLE SURGERY REFERRAL    # Colon cancer screening  -     GASTROENTEROLOGY ADULT REF PROCEDURE ONLY    # Blurred vision  # Ptosis of left eyelid  New onset blurring of vision associated with irritation, scleral injection, ptosis. Will refer to optho for further assistance with evaluation and management.   -     OPHTHALMOLOGY ADULT REFERRAL    # Wound of left ankle, initial encounter  # Toenail deformity  Bilateral foot wounds associated with peripheral neuropathy in setting of DM.   -     PODIATRY/FOOT & ANKLE SURGERY REFERRAL    # Need for vaccination  Prior shingles outbreak. No active lesions. Recommend vaccination with shingrix with immunization following patient investigation into insurance coverage.  -     ZOSTER VACCINE RECOMBINANT ADJUVANTED IM NJX     Pt should return to clinic for f/u with Dr. Salas in one year.     Options for treatment and follow-up care were reviewed with the patient. Jose Alfredo Tomlinson engaged in the decision making process and verbalized understanding of the options discussed and agreed with the final plan.    Oriana Butterfield MD  PGY-1, Internal Medicine  Feb 1, 2019    Pt was seen and plan of care discussed with Dr. Joseph.     Teaching Physician Note:  I was present during the visit and the patient was seen and examined by me.   I discussed the case with the resident and agree with the note  as documented by the resident with the following exceptions:  None.    Barbra Joseph M.D.  Internal Medicine   pager [phone]

## 2019-02-01 NOTE — PATIENT INSTRUCTIONS
Primary [facility] Center Medication Refill Request Information:  * Please contact your pharmacy regarding ANY request for medication refills.  ** [facility] Prescription Fax = [phone]  * Please allow 3 business days for routine medication refills.  * Please allow 5 business days for controlled substance medication refills.     [facility] Test Result notification information:  *You will be notified with in 7-10 days of your appointment day regarding the results of your test.  If you are on MyChart you will be notified as soon as the provider has reviewed the results and signed off on them.    Primary [facility]: [phone]     Colonoscopy/Endoscopy/Upper-GI   [phone]

## 2019-02-01 NOTE — NURSING NOTE
Chief Complaint   Patient presents with     Physical     here for annual physical       PHILLIP Cary 7:21 AM on 2/1/2019.

## 2019-02-04 ENCOUNTER — TELEPHONE (OUTPATIENT)
Dept: TRANSPLANT | Facility: CLINIC | Age: 59
End: 2019-02-04

## 2019-02-04 DIAGNOSIS — Z94.83 PANCREAS REPLACED BY TRANSPLANT (H): ICD-10-CM

## 2019-02-04 DIAGNOSIS — Z94.83 PANCREAS TRANSPLANTED (H): ICD-10-CM

## 2019-02-04 DIAGNOSIS — Z94.0 S/P KIDNEY TRANSPLANT: ICD-10-CM

## 2019-02-04 DIAGNOSIS — Z94.0 KIDNEY TRANSPLANTED: Primary | ICD-10-CM

## 2019-02-04 DIAGNOSIS — Z94.0 KIDNEY REPLACED BY TRANSPLANT: ICD-10-CM

## 2019-02-04 DIAGNOSIS — G62.9 PERIPHERAL NEUROPATHY: ICD-10-CM

## 2019-02-04 RX ORDER — MYCOPHENOLATE MOFETIL 500 MG/1
500 TABLET, FILM COATED ORAL 2 TIMES DAILY
Qty: 60 TABLET | Refills: 11 | Status: SHIPPED | OUTPATIENT
Start: 2019-02-04 | End: 2020-01-03

## 2019-02-04 RX ORDER — TACROLIMUS 0.5 MG/1
0.5 CAPSULE, GELATIN COATED ORAL 2 TIMES DAILY
Qty: 60 CAPSULE | Refills: 11 | Status: SHIPPED | OUTPATIENT
Start: 2019-02-04 | End: 2020-01-03

## 2019-02-04 RX ORDER — TACROLIMUS 1 MG/1
1 CAPSULE, GELATIN COATED ORAL 2 TIMES DAILY
Qty: 60 CAPSULE | Refills: 11 | Status: SHIPPED | OUTPATIENT
Start: 2019-02-04 | End: 2020-01-03

## 2019-02-04 NOTE — TELEPHONE ENCOUNTER
Prograf 0.5  Last FIlled Date 1/11  Qty dispensed 60    cellcept 500mg  Last FIlled Date 1/11  Qty dispensed 60      Prograf 1mg  Last FIlled Date 1/11  Qty dispensed 60    Thank you!  Angela Alfaro CPhT  [facility]

## 2019-02-04 NOTE — TELEPHONE ENCOUNTER
ISSUE:  Tacrolimus level 11.9, goal 5-8  Last tacrolimus level was 2/2018    PLAN:  Call and confirm a 12 hour trough and current tacrolimus level of 1.5 mg twice daily   Any recent illness, diarrhea, or medication changes?  Since there are not recent tacrolimus levels, recommend repeating level this week.  Will make dose adjustment at that time if needed.  Educate patient that tacrolimus level should be drawn monthly with the rest of transplant labs    OUTCOME:  Called and left a v/m for patient to return call to sot office to discuss  Lab orders updated.

## 2019-02-05 RX ORDER — ASCORBIC ACID 500 MG
TABLET ORAL
Qty: 180 TABLET | Refills: 3 | Status: SHIPPED | OUTPATIENT
Start: 2019-02-05 | End: 2020-01-07

## 2019-02-06 NOTE — TELEPHONE ENCOUNTER
Called and left a detailed voicemail for patient to recommending patient repeat tacrolimus level with a 12 hour trough within the next week.

## 2019-02-13 NOTE — TELEPHONE ENCOUNTER
FUTURE VISIT INFORMATION      FUTURE VISIT INFORMATION:    Date: 02/21/19    Time: 800am    Location: [facility]  REFERRAL INFORMATION:    Referring provider:  EDUARD MEJIA/     Referring providers clinic:  Internal Medicine Clinic    Reason for visit/diagnosis  Ptosis of left eyelid and blurred vision    RECORDS REQUESTED FROM:       Clinic name Comments Records Status Imaging Status   [facility] Notes in [facility] per visit with  on 5/4/18 IN [facility]     Internal Medicine Clinic Notes in [facility] per visit on 2/1/19 with  IN [facility]

## 2019-02-21 ENCOUNTER — TELEPHONE (OUTPATIENT)
Dept: INTERNAL MEDICINE | Facility: CLINIC | Age: 59
End: 2019-02-21

## 2019-02-21 ENCOUNTER — OFFICE VISIT (OUTPATIENT)
Dept: OPHTHALMOLOGY | Facility: CLINIC | Age: 59
End: 2019-02-21
Payer: MEDICARE

## 2019-02-21 ENCOUNTER — PRE VISIT (OUTPATIENT)
Dept: OPHTHALMOLOGY | Facility: CLINIC | Age: 59
End: 2019-02-21

## 2019-02-21 ENCOUNTER — DOCUMENTATION ONLY (OUTPATIENT)
Dept: OPHTHALMOLOGY | Facility: CLINIC | Age: 59
End: 2019-02-21

## 2019-02-21 DIAGNOSIS — H02.402 INVOLUTIONAL PTOSIS, ACQUIRED, LEFT: Primary | ICD-10-CM

## 2019-02-21 ASSESSMENT — VISUAL ACUITY
OD_SC+: -1
METHOD: SNELLEN - LINEAR
OD_SC: 20/25
OS_SC: 20/30

## 2019-02-21 ASSESSMENT — TONOMETRY
OD_IOP_MMHG: 21
OS_IOP_MMHG: 18
IOP_METHOD: ICARE

## 2019-02-21 ASSESSMENT — EXTERNAL EXAM - RIGHT EYE: OD_EXAM: NORMAL

## 2019-02-21 ASSESSMENT — SLIT LAMP EXAM - LIDS: COMMENTS: NORMAL

## 2019-02-21 ASSESSMENT — CONF VISUAL FIELD
OS_NORMAL: 1
OD_NORMAL: 1
METHOD: COUNTING FINGERS

## 2019-02-21 NOTE — LETTER
2019         RE:  :  MRN: Jose Alfredo Tomlinson  1960  [id number]     Dear Dr. Butterfield,    Thank you for asking me to see your patient, Jose Alfredo Tomlinson, for an oculoplastic   consultation.  My assessment and plan are below.  For further details, please see my attached clinic note.                HPI:     Chief Complaint(s) and History of Present Illness(es)     Droopy Eye Lid Evaluation     Laterality: left upper lid    Severity: moderate    Associated signs and symptoms: tearing and blurred vision    Treatments tried: eye drops    Response to treatment: moderate improvement    Pain scale: 0/10           Comments     Patient has on and off droopiness of the left eye- it is not all the   time. Pt associates it with blurry vision in the left eye and notes that   artifical tears help.     Richar Dowling COT 7:54 AM 2019       The droopy eyelid is interfering with activities of daily living including computer work and reading. Lifting up his left upper eyelid certainly helps. The patient denies double vision, variability of the eyelid position, or dry eye symptoms.     EXAM:     MRD1: 3 right eye and 1 left eye   Aponeurotic ptosis   VISUAL FIELD:  Right eye untaped:n/a  Left eye untaped:18 degrees Left eye taped:52 degrees    Assessment & Plan     Jose Alfredo Tomlinson is a 58 year old male with the following diagnoses:   1. Involutional ptosis, acquired, left       Left ptosis repair MMCR     PMHx include kidney pancrease transplant   ANTICOAGULATION:  Aspirin 81 mg - can hold        Again, thank you for allowing me to participate in the care of your patient.      Sincerely,    Rj Jose MD  Department of Ophthalmology and Visual Neurosciences  [facility]    CC: Oriana Butterfield MD  [address] [place] [phone]  [facility] [id number]  VIA In Basket

## 2019-02-21 NOTE — TELEPHONE ENCOUNTER
[facility] Call Center    Phone Message    May a detailed message be left on voicemail: yes    Reason for Call: Other: .  pt was told to make an appt with  for paper work, but next available is March 15th so pt is sending message instead. If pt must be seen he would like an appt sooner than a month from now.    Action Taken: Message routed to:  [facility] ([facility] SHIELA

## 2019-02-21 NOTE — NURSING NOTE
Chief Complaints and History of Present Illnesses   Patient presents with     Droopy Eye Lid Evaluation     Chief Complaint(s) and History of Present Illness(es)     Droopy Eye Lid Evaluation     Laterality: left upper lid    Severity: moderate    Associated signs and symptoms: tearing and blurred vision    Treatments tried: eye drops    Response to treatment: moderate improvement    Pain scale: 0/10              Comments     Patient has on and off droopiness of the left eye- it is not all the time. Pt associates it with blurry vision in the left eye and notes that artifical tears help.     Richar Dowling COT 7:54 AM February 21, 2019

## 2019-02-21 NOTE — PROGRESS NOTES
Oculoplastic Clinic New Patient    Patient: Jose Alfredo Tomlinson MRN# [id number]   YOB: 1960 Age: 58 year old   Date of Visit: Feb 21, 2019    CC: Droopy eyelids obstructing vision.              HPI:     Chief Complaint(s) and History of Present Illness(es)     Droopy Eye Lid Evaluation     Laterality: left upper lid    Severity: moderate    Associated signs and symptoms: tearing and blurred vision    Treatments tried: eye drops    Response to treatment: moderate improvement    Pain scale: 0/10           Comments     Patient has on and off droopiness of the left eye- it is not all the   time. Pt associates it with blurry vision in the left eye and notes that   artifical tears help.     Richar Dowling COT 7:54 AM February 21, 2019       The droopy eyelid is interfering with activities of daily living including computer work and reading. Lifting up his left upper eyelid certainly helps. The patient denies double vision, variability of the eyelid position, or dry eye symptoms.     EXAM:     MRD1: 3 right eye and 1 left eye   Aponeurotic ptosis   VISUAL FIELD:  Right eye untaped:n/a  Left eye untaped:18 degrees Left eye taped:52 degrees    Assessment & Plan     Jose Alfredo Tomlinson is a 58 year old male with the following diagnoses:   1. Involutional ptosis, acquired, left       Left ptosis repair MMCR 9    Also has component of floppy upper eyelid with lash ptosis - observe.   PMHx include kidney pancrease transplant   ANTICOAGULATION:  Aspirin 81 mg - can hold         PHOTOS DEMONSTRATE:  Aponeurotic blepharoptosis      Attending Physician Attestation:  Complete documentation of historical and exam elements from today's encounter can be found in the full encounter summary report (not reduplicated in this progress note).  I personally obtained the chief complaint(s) and history of present illness.  I confirmed and edited as necessary the review of systems, past medical/surgical history, family history, social  history, and examination findings as documented by others; and I examined the patient myself.  I personally reviewed the relevant tests, images, and reports as documented above.  I formulated and edited as necessary the assessment and plan and discussed the findings and management plan with the patient and family. - Rj Jose MD    Today with Jose Alfredo Tomlinson, I reviewed the indications, risks, benefits, and alternatives of the proposed surgical procedure including, but not limited to, failure obtain the desired result  and need for additional surgery, bleeding, infection, loss of vision, loss of the eye, and the remote possibility of permanent damage to any organ system or death with the use of anesthesia.  I provided multiple opportunities for the questions, answered all questions to the best of my ability, and confirmed that my answers and my discussion were understood.

## 2019-02-21 NOTE — TELEPHONE ENCOUNTER
Spoke to patient on the phone to schedule appointment for this Saturday 2/23/19. Patient states that he also needs a pre op appointment. Patient was offered to schedule a pre op appointment for a different time, patient declined. Patient was advised that he would need to come in for a different appointment to have the pre op done. Hodan Cui LPN 2/21/2019 9:03 AM

## 2019-02-21 NOTE — TELEPHONE ENCOUNTER
Pt called and informed he will need a face to face appointment with Dr Joseph(per Dr Joseph) before [employer] forms can be completed. Pt given appt time and dates which he declined at this time. Clinic numbers given for pt to schedule face to face with Dr Joseph. Forms placed in Dr Joseph in folder.  Betty Wiggins RN 8:13 AM on 2/21/2019.

## 2019-02-22 ENCOUNTER — TELEPHONE (OUTPATIENT)
Dept: GASTROENTEROLOGY | Facility: CLINIC | Age: 59
End: 2019-02-22

## 2019-02-22 DIAGNOSIS — Z12.11 ENCOUNTER FOR SCREENING COLONOSCOPY: Primary | ICD-10-CM

## 2019-02-22 NOTE — TELEPHONE ENCOUNTER
Patient scheduled for  Colonoscopy    Indication for procedure. Screening    Referring Provider. Oriana Butterfield MD    ? N/a     Arrival time verified? 7:30 AM    Facility location verified? [address] [place] [address]    Instructions given regarding prep and procedure patient declined review    Prep Type Golytely    Are you taking any anticoagulants or blood thinners? No    Instructions given? N/a     Electronic implanted devices? Denies     Pre procedure teaching completed? Yes    Transportation from procedure?  policy reviewed    H&P / Pre op physical completed? N/a

## 2019-02-23 ENCOUNTER — OFFICE VISIT (OUTPATIENT)
Dept: INTERNAL MEDICINE | Facility: CLINIC | Age: 59
End: 2019-02-23
Payer: MEDICARE

## 2019-02-23 VITALS
BODY MASS INDEX: 26.58 KG/M2 | SYSTOLIC BLOOD PRESSURE: 176 MMHG | WEIGHT: 190.6 LBS | DIASTOLIC BLOOD PRESSURE: 74 MMHG | RESPIRATION RATE: 18 BRPM | HEART RATE: 79 BPM

## 2019-02-23 DIAGNOSIS — Z94.83 PANCREAS TRANSPLANTED (H): ICD-10-CM

## 2019-02-23 DIAGNOSIS — G62.9 PERIPHERAL POLYNEUROPATHY: Primary | ICD-10-CM

## 2019-02-23 DIAGNOSIS — E10.40 DIABETIC NEUROPATHY, TYPE I DIABETES MELLITUS (H): ICD-10-CM

## 2019-02-23 DIAGNOSIS — Z94.0 S/P KIDNEY TRANSPLANT: ICD-10-CM

## 2019-02-23 ASSESSMENT — PAIN SCALES - GENERAL: PAINLEVEL: NO PAIN (0)

## 2019-02-23 NOTE — NURSING NOTE
Chief Complaint   Patient presents with     Forms     Patient is here for forms       Cesario Sy CMA (AAMA) at 10:33 AM on 2/23/2019

## 2019-02-23 NOTE — PROGRESS NOTES
CC:  Primary Care Physician is Dr. Rosio Salas.Dr. Oriana Butterfield (resident)    HPI:  Requesting form to be completed for [employer], Eligibility Application, Professional Verification, Americans with Disabilities Act (ADA)--4 pages long  Reviewed form in detail with patient and completed, scanned for chart, original given to patient along with a photocopy as well    Patient Active Problem List   Diagnosis     Pancreas transplanted (H)     S/P kidney transplant     History of diabetes mellitus, type I     Hyperlipidemia     Peripheral neuropathy     PAD (peripheral artery disease) (H)     Skin exam, screening for cancer     Seborrheic keratosis     Acquired perforating dermatosis     Cellulitis     Atrial bigeminy     Kidney replaced by transplant     Pancreas replaced by transplant (H)     Benign essential hypertension     Diabetic neuropathy, type I diabetes mellitus (H)     Right foot pain     Left foot pain     Tactile anesthesia     Personal history of diabetic foot ulcer     Right foot drop     Wrist drop, bilateral     Swelling of limb     Current Outpatient Medications   Medication Sig Dispense Refill     amLODIPine (NORVASC) 2.5 MG tablet Take 1 tablet (2.5 mg) by mouth daily 90 tablet 3     ascorbic acid (VITAMIN C) 500 MG tablet Take 2 tablets (1,000 mg) by mouth daily 180 tablet 1     ascorbic acid (VITAMIN C) 500 MG tablet Take 2 tablets (1,000 mg) by mouth daily 180 tablet 3     aspirin 81 MG tablet Take 1 tablet (81 mg) by mouth daily 100 tablet 3     CELLCEPT (BRAND) 500 MG tablet Take 1 tablet (500 mg) by mouth 2 times daily 60 tablet 11     cholecalciferol (VITAMIN D) 400 units tablet TAKE ONE TABLET BY MOUTH EVERY DAY 90 tablet 0     Cholecalciferol 400 UNITS CAPS Take 1 capsule by mouth daily 90 capsule 3     gabapentin (NEURONTIN) 300 MG capsule Take 1 capsule (300 mg) by mouth 3 times daily 90 capsule 1     gabapentin (NEURONTIN) 300 MG capsule Take 1 capsule (300 mg) by mouth 3 times  daily 90 capsule 1     ganciclovir 0.15 % GEL Use one drop in right eye 5 times each day for 7 days. 1 Tube 1     losartan (COZAAR) 25 MG tablet Take 1 tablet by mouth daily. Increase by 25 mg (1 tablet) every 7 days to 100 mg daily. Final dose 100 mg (4 tablets) by mouth daily 70 tablet 3     Multiple Vitamin (TAB-A-AMBER) TABS TAKE ONE TABLET BY MOUTH EVERY DAY 90 tablet 0     multivitamin, therapeutic with minerals (MULTI-VITAMIN) TABS tablet Take 1 tablet by mouth daily 100 each 3     omeprazole (PRILOSEC) 20 MG CR capsule Take 1 capsule (20 mg) by mouth 2 times daily 180 capsule 2     ondansetron (ZOFRAN-ODT) 4 MG ODT tab DISSOLVE 1 TABLET ON THE TONGUE EVERY 8 HOURS AS NEEDED FOR NAUSEA 10 tablet 5     order for DME Right carbon fiber AFO replacement due to fracture at posterior strut and due to age of brace greater than 5 years.  Patient requires AFOs for support and stability to enable safe ambulation 1 Units 1     order for DME Equipment being ordered: therapeutic shoes and insoles. For Peripheral neuropathy, diabetes type 1 and poor circulation 1 Package 1     PROGRAF (BRAND) 0.5 MG capsule Take 1 capsule (0.5 mg) by mouth 2 times daily (total dose 1.5 mg twice per day) 60 capsule 11     PROGRAF (BRAND) 1 MG capsule Take 1 capsule (1 mg) by mouth 2 times daily (total dose 1.5 mg twice per day) 60 capsule 11     simvastatin (ZOCOR) 20 MG tablet Take 1 tablet (20 mg) by mouth At Bedtime 30 tablet 0     simvastatin (ZOCOR) 20 MG tablet Take 1 tablet (20 mg) by mouth At Bedtime 90 tablet 2     SM ASPIRIN ADULT LOW STRENGTH 81 MG EC tablet TAKE ONE TABLET BY MOUTH EVERY DAY 90 tablet 0     sodium bicarbonate 650 MG tablet TAKE ONE TABLET BY MOUTH THREE TIMES A DAY 90 tablet 3     sulfamethoxazole-trimethoprim (BACTRIM) 400-80 MG per tablet Take 1 tablet by mouth twice a week 10 tablet 11     vitamin C (ASCORBIC ACID) 500 MG tablet TAKE TWO TABLETS BY MOUTH EVERY  tablet 3     No Known Allergies  /74    Pulse 79   Resp 18   Wt 86.5 kg (190 lb 9.6 oz)   BMI 26.58 kg/m    BP Readings from Last 6 Encounters:   02/23/19 176/74   02/01/19 134/79   08/23/18 173/70   03/13/18 151/86   01/16/18 154/84   12/20/17 (!) 157/94       A/P:  [employer] form completed.  F/U with PCP (Dr. Watson/Dr. Salas) in one year per note for physical earlier this month.    Total time spent 15 minutes.  More than 50% of the time spent with Mr. Tomlinson on counseling / coordinating his care      Barbra Joseph M.D.  Internal Medicine  [facility]   pager [phone]

## 2019-02-23 NOTE — PATIENT INSTRUCTIONS
"    INSTRUCTIONS FOR FOLLOWING UP WITH YOUR PRIMARY CARE DOCTOR:    Your primary care doctor is: Dr. Oriana Watson, staff physician Dr. Rosio Salas    1.  She is a resident doctor who is supervised by various staff doctors in the    [facility]. We very much appreciate your willingness to participate in the education of our resident doctors!  Our resident doctors are in the process of specializing in Internal Medicine and related specialties.    2.  Your primary care doctor is available on Tuesday's    3.  Please make ALL follow up appointments with your primary care doctor.    4.  Make all follow up appointments BEFORE YOU LEAVE TODAY'S VISIT.   That way, you secure future appointments and you don't have to schedule them at the last minute.    7.  If you anticipate needing to see your primary care frequently, make several future appointments with your primary care doctor.That way you won't have to try to schedule them at the last minute.    8.  Do not schedule appointments in the resident clinic with the intention of seeing a doctor who supervises the residents    9.  If you primary care doctor is not available, SCHEDULE AN WITH ANY ONE OF HIS/HER COLLEAGUES IN THE [facility]    10.  If participation in the Resident Clinic is not an acceptable arrangement for you, please make an \"establish care\" appointment with a provider who is not a resident.  (Dr. Rosio Salas)      "

## 2019-02-23 NOTE — PROGRESS NOTES
Performed Visual Acuity needed for [employer] form:    Left Eye: 20/50  Right Eye: 20/30        Cesario Sy CMA (AAMA) at 11:19 AM on 2/23/2019

## 2019-02-28 ENCOUNTER — DOCUMENTATION ONLY (OUTPATIENT)
Dept: CARE COORDINATION | Facility: CLINIC | Age: 59
End: 2019-02-28

## 2019-02-28 ENCOUNTER — OFFICE VISIT (OUTPATIENT)
Dept: INTERNAL MEDICINE | Facility: CLINIC | Age: 59
End: 2019-02-28
Payer: MEDICARE

## 2019-02-28 VITALS
SYSTOLIC BLOOD PRESSURE: 129 MMHG | BODY MASS INDEX: 26.6 KG/M2 | HEIGHT: 71 IN | WEIGHT: 190 LBS | HEART RATE: 74 BPM | OXYGEN SATURATION: 96 % | DIASTOLIC BLOOD PRESSURE: 83 MMHG

## 2019-02-28 DIAGNOSIS — Z94.83 PANCREAS TRANSPLANTED (H): ICD-10-CM

## 2019-02-28 DIAGNOSIS — G62.9 PERIPHERAL POLYNEUROPATHY: ICD-10-CM

## 2019-02-28 DIAGNOSIS — I10 BENIGN ESSENTIAL HYPERTENSION: ICD-10-CM

## 2019-02-28 DIAGNOSIS — Z94.0 S/P KIDNEY TRANSPLANT: ICD-10-CM

## 2019-02-28 DIAGNOSIS — Z01.818 PREOP GENERAL PHYSICAL EXAM: ICD-10-CM

## 2019-02-28 DIAGNOSIS — I73.9 PAD (PERIPHERAL ARTERY DISEASE) (H): Primary | ICD-10-CM

## 2019-02-28 RX ORDER — LOSARTAN POTASSIUM 100 MG/1
100 TABLET ORAL DAILY
Qty: 90 TABLET | Refills: 3 | Status: ON HOLD | OUTPATIENT
Start: 2019-02-28 | End: 2019-03-15

## 2019-02-28 ASSESSMENT — MIFFLIN-ST. JEOR: SCORE: 1713.96

## 2019-02-28 ASSESSMENT — PAIN SCALES - GENERAL: PAINLEVEL: NO PAIN (0)

## 2019-02-28 NOTE — PATIENT INSTRUCTIONS
[facility] Medication Refill Request Information:  * Please contact your pharmacy regarding ANY request for medication refills.  ** [facility] Prescription Fax = [phone]  * Please allow 3 business days for routine medication refills.  * Please allow 5 business days for controlled substance medication refills.     [facility] Test Result notification information:  *You will be notified with in 7-10 days of your appointment day regarding the results of your test.  If you are on MyChart you will be notified as soon as the provider has reviewed the results and signed off on them.    [facility]: [phone]     Do not take losartan on morning of surgery.  Do not take Aspirin for 7 days prior to surgery.

## 2019-02-28 NOTE — PROGRESS NOTES
Surgeon: Rj Jose MD  Fax number for Pre Op Evaluation: N/A  Location of the surgery: [facility]   Date of the surgery: 3/15/2019  Procedure: LEFT UPPER EYELID PTOSIS REPAIR  History of reaction to anesthesia? : NO

## 2019-02-28 NOTE — PROGRESS NOTES
[facility]  Primary Care Center   Rosio Salas MD  02/28/2019     Chief Complaint:   Pre-Op Exam      History of Present Illness:   Jose Alfredo Tomlinson is 58 year old male here at the request of Dr. Jose for cardiovascular, pulmonary, and perioperative risk assessment prior to surgery.  The intended surgical procedure is left upper eyelid ptosis repair.  A copy of this note will be sent to the surgeon.     Reason for surgery:  He states that his left eye lid hs been bothering him on and off for awhile. It is also blocking his vision, particularly when looking at a computer screen. Further, that when he lifts up his eyelids, he is able to see much better. He does not drive anymore. He states that since his last visit, he has had an infection in his left eye, however this has since cleared up.     He states his kidney and pancreas transplant has gone smoothly. He no longer has diabetes, however still struggles with the neuropathy. He still uses he does have some sores on his feet, however does have an upcoming appointment with his podiatrist. He denies any open sores and infections on his feet. He is still using a walker very often due to loss of feeling in his feet. He occassionally does not need it at home, however for the most part relies on his walker.     He does have concern regarding his losartan for his blood pressure, as his insurance sent a note stating that they may not cover it next time.     Cardiovascular Risk:  This patient does not have chest pain with ambulation or walking up a flight of stairs.  There is not any chest pain with exercise.  He does not have a history of hypertension, high cholesterol.  There is a history of PAD.  There is not a history of stroke or valvular disease.    Echo 2015:  Interpretation Summary  1.  Left ventricular function is normal.The EF is 55-60%. 2.  Borderline   concentric wall thickening consistent with left ventricular hypertrophy is   present. 3.  Left  ventricular Doppler filling pattern consistent with mild   diastolic dysfunction.  Mild left atrial enlargement is present.    Pulmonary Risk:  The patient does not have any history of lung problems.    Perioperative Complications:  The patient does not have a history of bleeding or clotting problems in the past, specifically has no history of DVT, PE, or bleeding disorder.  They are not currently anticoagulated.  He has not had complications from past surgeries.  The patient does not have a family history of any anesthesia or surgical complications.    Review of Systems:   Pertinent items are noted in HPI, remainder of complete ROS is negative.      Active Medications:      amLODIPine (NORVASC) 2.5 MG tablet, Take 1 tablet (2.5 mg) by mouth daily, Disp: 90 tablet, Rfl: 3     ascorbic acid (VITAMIN C) 500 MG tablet, Take 2 tablets (1,000 mg) by mouth daily, Disp: 180 tablet, Rfl: 1     aspirin 81 MG tablet, Take 1 tablet (81 mg) by mouth daily, Disp: 100 tablet, Rfl: 3     CELLCEPT (BRAND) 500 MG tablet, Take 1 tablet (500 mg) by mouth 2 times daily, Disp: 60 tablet, Rfl: 11     cholecalciferol (VITAMIN D) 400 units tablet, TAKE ONE TABLET BY MOUTH EVERY DAY, Disp: 90 tablet, Rfl: 0     Cholecalciferol 400 UNITS CAPS, Take 1 capsule by mouth daily, Disp: 90 capsule, Rfl: 3     gabapentin (NEURONTIN) 300 MG capsule, Take 1 capsule (300 mg) by mouth 3 times daily, Disp: 90 capsule, Rfl: 1     gabapentin (NEURONTIN) 300 MG capsule, Take 1 capsule (300 mg) by mouth 3 times daily, Disp: 90 capsule, Rfl: 1     ganciclovir 0.15 % GEL, Use one drop in right eye 5 times each day for 7 days., Disp: 1 Tube, Rfl: 1     losartan (COZAAR) 25 MG tablet, Take 1 tablet by mouth daily. Increase by 25 mg (1 tablet) every 7 days to 100 mg daily. Final dose 100 mg (4 tablets) by mouth daily, Disp: 70 tablet, Rfl: 3     Multiple Vitamin (TAB-A-AMBER) TABS, TAKE ONE TABLET BY MOUTH EVERY DAY, Disp: 90 tablet, Rfl: 0     multivitamin,  therapeutic with minerals (MULTI-VITAMIN) TABS tablet, Take 1 tablet by mouth daily, Disp: 100 each, Rfl: 3     omeprazole (PRILOSEC) 20 MG CR capsule, Take 1 capsule (20 mg) by mouth 2 times daily, Disp: 180 capsule, Rfl: 2     ondansetron (ZOFRAN-ODT) 4 MG ODT tab, DISSOLVE 1 TABLET ON THE TONGUE EVERY 8 HOURS AS NEEDED FOR NAUSEA, Disp: 10 tablet, Rfl: 5     PROGRAF (BRAND) 0.5 MG capsule, Take 1 capsule (0.5 mg) by mouth 2 times daily (total dose 1.5 mg twice per day), Disp: 60 capsule, Rfl: 11     PROGRAF (BRAND) 1 MG capsule, Take 1 capsule (1 mg) by mouth 2 times daily (total dose 1.5 mg twice per day), Disp: 60 capsule, Rfl: 11     simvastatin (ZOCOR) 20 MG tablet, Take 1 tablet (20 mg) by mouth At Bedtime, Disp: 30 tablet, Rfl: 0     SM ASPIRIN ADULT LOW STRENGTH 81 MG EC tablet, TAKE ONE TABLET BY MOUTH EVERY DAY, Disp: 90 tablet, Rfl: 0     sodium bicarbonate 650 MG tablet, TAKE ONE TABLET BY MOUTH THREE TIMES A DAY, Disp: 90 tablet, Rfl: 3     sulfamethoxazole-trimethoprim (BACTRIM) 400-80 MG per tablet, Take 1 tablet by mouth twice a week, Disp: 10 tablet, Rfl: 11     vitamin C (ASCORBIC ACID) 500 MG tablet, TAKE TWO TABLETS BY MOUTH EVERY DAY, Disp: 180 tablet, Rfl: 3      Allergies:   Patient has no known allergies.      Past Medical History:  Cataracts  Gastroparesis  Gastroesophageal reflux disease  Type 1 diabetes  Hyperlipidemia  Hypertension  Pancreas transplant  Peripheral neuropathy  Kidney transplant   perihperal artery disease  Seborrheic keratosis  Acquired perforating dermatitis  Cellulitis  Atrial bigeminy  Tactile anesthesia  Diabetic foot ulcer  Wrist drop bilateral      Past Surgical History:  Cataract IOL  Colonoscopy  Esophagoscopy, gastroscopy, duodenoscopy, combined  Transplant of kidney and pancreas     Family History:   Diabetes - son, father, paternal grandfather  Arthritis - mother  Skin cancer - paternal grandfather, maternal grandfather      Social History:   This patient  "presented alone.   Smoking status: never  Smokeless tobacco: never  Alcohol use: no  Drug use: no     Physical Exam:   /83 (BP Location: Right arm, Patient Position: Sitting, Cuff Size: Adult Regular)   Pulse 74   Ht 1.803 m (5' 11\")   Wt 86.2 kg (190 lb)   SpO2 96%   BMI 26.50 kg/m     Constitutional: Alert, oriented, pleasant, no acute distress  Head: Normocephalic, atraumatic  Eyes: Extra-ocular movements intact, no scleral icterus  ENT: Oropharynx clear, moist mucus membranes, fair dentition  Neck: Supple, no lymphadenopathy, no thyromegaly   Cardiovascular: Regular rate and rhythm, no murmurs, rubs or gallops, peripheral pulses full/symmetric  Respiratory: Good air movement bilaterally, lungs clear, no wheezes/rales/rhonchi  Musculoskeletal: No edema, normal muscle tone, ambulates with a walker.   Neurologic: Alert and oriented, cranial nerves 2-12 intact. Bilateral sensory neuropathy present.   Psychiatric: normal mentation, affect and mood     Recent Labs:  Component      Latest Ref Rng & Units 2/1/2019   Sodium      133 - 144 mmol/L 142   Potassium      3.4 - 5.3 mmol/L 4.3   Chloride      94 - 109 mmol/L 113 (H)   Carbon Dioxide      20 - 32 mmol/L 20   Anion Gap      3 - 14 mmol/L 8   Glucose      70 - 99 mg/dL 86   Urea Nitrogen      7 - 30 mg/dL 28   Creatinine      0.66 - 1.25 mg/dL 0.94   GFR Estimate      >60 mL/min/1.73:m2 90   GFR Estimate If Black      >60 mL/min/1.73:m2 >90   Calcium      8.5 - 10.1 mg/dL 8.8   WBC      4.0 - 11.0 10e9/L 6.3   RBC Count      4.4 - 5.9 10e12/L 4.86   Hemoglobin      13.3 - 17.7 g/dL 13.8   Hematocrit      40.0 - 53.0 % 41.5   MCV      78 - 100 fl 85   MCH      26.5 - 33.0 pg 28.4   MCHC      31.5 - 36.5 g/dL 33.3   RDW      10.0 - 15.0 % 13.5   Platelet Count      150 - 450 10e9/L 168   Cholesterol      <200 mg/dL 164   Triglycerides      <150 mg/dL 82   HDL Cholesterol      >39 mg/dL 44   LDL Cholesterol Calculated      <100 mg/dL 104 (H)   Non HDL " Cholesterol      <130 mg/dL 120   Hemoglobin A1C      0 - 5.6 % 4.9       EKG:  EKG was indicated based on risk assessment.  Sinus, normal axis, normal intervals, no acute changes    Pregnancy test needed: No     Assessment and Plan:  1. Pre-operative assessment for left upper eyelid ptosis repair.   The patient is at LOW risk for cardiovascular complications and at LOW risk for pulmonary complications of this MODERATE risk surgery.    --Approval given to proceed with proposed procedure, without further diagnostic evaluation  --Patient is taking an  Ace inhibitor or Angiotensin Receptor Blocker (ARB) and should HOLD this medication for the 24 hours prior to surgery.  --Patient is currently taking    Anticoagulant or Antiplatelet Medication Use  ASPIRIN: Discontinue ASA 7-10 days prior to procedure to reduce bleeding risk.  It should be resumed post-operatively.    --Patient is to take all other scheduled medications on the day of surgery     The patient has been told to not take any aspirin or NSAIDs for 10 days prior to surgery. The patient has been instructed as to what to do with medications prior to surgery.    PAD (peripheral artery disease) (H)  Stable, no acute changes or concerns warranting further evaluation.   - EKG Performed in Clinic w/ Provider Reading Fee    Benign essential hypertension  Well controlled on losartan. The 100 mg tablets should be covered, however we should receive a note if insurance will no longer cover this.   - losartan (COZAAR) 100 MG tablet  Dispense: 90 tablet; Refill: 3  - EKG Performed in Clinic w/ Provider Reading Fee    S/P kidney transplant  Pancreas transplanted (H)  Currently stable, and being monitored by nephrology.     Peripheral polyneuropathy    Preop general physical exam  - EKG Performed in Clinic w/ Provider Reading Fee          Scribe Disclosure:   Verenice NGUYEN, am serving as a scribe to document services personally performed by Rosio Salas MD at  this visit, based upon the provider's statements to me. All documentation has been reviewed by the aforementioned provider prior to being entered into the official medical record.     Portions of this medical record were completed by a scribe. UPON MY REVIEW AND AUTHENTICATION BY ELECTRONIC SIGNATURE, this confirms (a) I performed the applicable clinical services, and (b) the record is accurate.    Rosio Salas MD  Internal Medicine

## 2019-02-28 NOTE — H&P (VIEW-ONLY)
[facility]  Primary Care Center   Rosio Salas MD  02/28/2019     Chief Complaint:   Pre-Op Exam      History of Present Illness:   Jose Alfredo Tomlinson is 58 year old male here at the request of Dr. Jose for cardiovascular, pulmonary, and perioperative risk assessment prior to surgery.  The intended surgical procedure is left upper eyelid ptosis repair.  A copy of this note will be sent to the surgeon.     Reason for surgery:  He states that his left eye lid hs been bothering him on and off for awhile. It is also blocking his vision, particularly when looking at a computer screen. Further, that when he lifts up his eyelids, he is able to see much better. He does not drive anymore. He states that since his last visit, he has had an infection in his left eye, however this has since cleared up.     He states his kidney and pancreas transplant has gone smoothly. He no longer has diabetes, however still struggles with the neuropathy. He still uses he does have some sores on his feet, however does have an upcoming appointment with his podiatrist. He denies any open sores and infections on his feet. He is still using a walker very often due to loss of feeling in his feet. He occassionally does not need it at home, however for the most part relies on his walker.     He does have concern regarding his losartan for his blood pressure, as his insurance sent a note stating that they may not cover it next time.     Cardiovascular Risk:  This patient does not have chest pain with ambulation or walking up a flight of stairs.  There is not any chest pain with exercise.  He does not have a history of hypertension, high cholesterol.  There is a history of PAD.  There is not a history of stroke or valvular disease.    Echo 2015:  Interpretation Summary  1.  Left ventricular function is normal.The EF is 55-60%. 2.  Borderline   concentric wall thickening consistent with left ventricular hypertrophy is   present. 3.  Left  ventricular Doppler filling pattern consistent with mild   diastolic dysfunction.  Mild left atrial enlargement is present.    Pulmonary Risk:  The patient does not have any history of lung problems.    Perioperative Complications:  The patient does not have a history of bleeding or clotting problems in the past, specifically has no history of DVT, PE, or bleeding disorder.  They are not currently anticoagulated.  He has not had complications from past surgeries.  The patient does not have a family history of any anesthesia or surgical complications.    Review of Systems:   Pertinent items are noted in HPI, remainder of complete ROS is negative.      Active Medications:      amLODIPine (NORVASC) 2.5 MG tablet, Take 1 tablet (2.5 mg) by mouth daily, Disp: 90 tablet, Rfl: 3     ascorbic acid (VITAMIN C) 500 MG tablet, Take 2 tablets (1,000 mg) by mouth daily, Disp: 180 tablet, Rfl: 1     aspirin 81 MG tablet, Take 1 tablet (81 mg) by mouth daily, Disp: 100 tablet, Rfl: 3     CELLCEPT (BRAND) 500 MG tablet, Take 1 tablet (500 mg) by mouth 2 times daily, Disp: 60 tablet, Rfl: 11     cholecalciferol (VITAMIN D) 400 units tablet, TAKE ONE TABLET BY MOUTH EVERY DAY, Disp: 90 tablet, Rfl: 0     Cholecalciferol 400 UNITS CAPS, Take 1 capsule by mouth daily, Disp: 90 capsule, Rfl: 3     gabapentin (NEURONTIN) 300 MG capsule, Take 1 capsule (300 mg) by mouth 3 times daily, Disp: 90 capsule, Rfl: 1     gabapentin (NEURONTIN) 300 MG capsule, Take 1 capsule (300 mg) by mouth 3 times daily, Disp: 90 capsule, Rfl: 1     ganciclovir 0.15 % GEL, Use one drop in right eye 5 times each day for 7 days., Disp: 1 Tube, Rfl: 1     losartan (COZAAR) 25 MG tablet, Take 1 tablet by mouth daily. Increase by 25 mg (1 tablet) every 7 days to 100 mg daily. Final dose 100 mg (4 tablets) by mouth daily, Disp: 70 tablet, Rfl: 3     Multiple Vitamin (TAB-A-AMBER) TABS, TAKE ONE TABLET BY MOUTH EVERY DAY, Disp: 90 tablet, Rfl: 0     multivitamin,  therapeutic with minerals (MULTI-VITAMIN) TABS tablet, Take 1 tablet by mouth daily, Disp: 100 each, Rfl: 3     omeprazole (PRILOSEC) 20 MG CR capsule, Take 1 capsule (20 mg) by mouth 2 times daily, Disp: 180 capsule, Rfl: 2     ondansetron (ZOFRAN-ODT) 4 MG ODT tab, DISSOLVE 1 TABLET ON THE TONGUE EVERY 8 HOURS AS NEEDED FOR NAUSEA, Disp: 10 tablet, Rfl: 5     PROGRAF (BRAND) 0.5 MG capsule, Take 1 capsule (0.5 mg) by mouth 2 times daily (total dose 1.5 mg twice per day), Disp: 60 capsule, Rfl: 11     PROGRAF (BRAND) 1 MG capsule, Take 1 capsule (1 mg) by mouth 2 times daily (total dose 1.5 mg twice per day), Disp: 60 capsule, Rfl: 11     simvastatin (ZOCOR) 20 MG tablet, Take 1 tablet (20 mg) by mouth At Bedtime, Disp: 30 tablet, Rfl: 0     SM ASPIRIN ADULT LOW STRENGTH 81 MG EC tablet, TAKE ONE TABLET BY MOUTH EVERY DAY, Disp: 90 tablet, Rfl: 0     sodium bicarbonate 650 MG tablet, TAKE ONE TABLET BY MOUTH THREE TIMES A DAY, Disp: 90 tablet, Rfl: 3     sulfamethoxazole-trimethoprim (BACTRIM) 400-80 MG per tablet, Take 1 tablet by mouth twice a week, Disp: 10 tablet, Rfl: 11     vitamin C (ASCORBIC ACID) 500 MG tablet, TAKE TWO TABLETS BY MOUTH EVERY DAY, Disp: 180 tablet, Rfl: 3      Allergies:   Patient has no known allergies.      Past Medical History:  Cataracts  Gastroparesis  Gastroesophageal reflux disease  Type 1 diabetes  Hyperlipidemia  Hypertension  Pancreas transplant  Peripheral neuropathy  Kidney transplant   perihperal artery disease  Seborrheic keratosis  Acquired perforating dermatitis  Cellulitis  Atrial bigeminy  Tactile anesthesia  Diabetic foot ulcer  Wrist drop bilateral      Past Surgical History:  Cataract IOL  Colonoscopy  Esophagoscopy, gastroscopy, duodenoscopy, combined  Transplant of kidney and pancreas     Family History:   Diabetes - son, father, paternal grandfather  Arthritis - mother  Skin cancer - paternal grandfather, maternal grandfather      Social History:   This patient  "presented alone.   Smoking status: never  Smokeless tobacco: never  Alcohol use: no  Drug use: no     Physical Exam:   /83 (BP Location: Right arm, Patient Position: Sitting, Cuff Size: Adult Regular)   Pulse 74   Ht 1.803 m (5' 11\")   Wt 86.2 kg (190 lb)   SpO2 96%   BMI 26.50 kg/m     Constitutional: Alert, oriented, pleasant, no acute distress  Head: Normocephalic, atraumatic  Eyes: Extra-ocular movements intact, no scleral icterus  ENT: Oropharynx clear, moist mucus membranes, fair dentition  Neck: Supple, no lymphadenopathy, no thyromegaly   Cardiovascular: Regular rate and rhythm, no murmurs, rubs or gallops, peripheral pulses full/symmetric  Respiratory: Good air movement bilaterally, lungs clear, no wheezes/rales/rhonchi  Musculoskeletal: No edema, normal muscle tone, ambulates with a walker.   Neurologic: Alert and oriented, cranial nerves 2-12 intact. Bilateral sensory neuropathy present.   Psychiatric: normal mentation, affect and mood     Recent Labs:  Component      Latest Ref Rng & Units 2/1/2019   Sodium      133 - 144 mmol/L 142   Potassium      3.4 - 5.3 mmol/L 4.3   Chloride      94 - 109 mmol/L 113 (H)   Carbon Dioxide      20 - 32 mmol/L 20   Anion Gap      3 - 14 mmol/L 8   Glucose      70 - 99 mg/dL 86   Urea Nitrogen      7 - 30 mg/dL 28   Creatinine      0.66 - 1.25 mg/dL 0.94   GFR Estimate      >60 mL/min/1.73:m2 90   GFR Estimate If Black      >60 mL/min/1.73:m2 >90   Calcium      8.5 - 10.1 mg/dL 8.8   WBC      4.0 - 11.0 10e9/L 6.3   RBC Count      4.4 - 5.9 10e12/L 4.86   Hemoglobin      13.3 - 17.7 g/dL 13.8   Hematocrit      40.0 - 53.0 % 41.5   MCV      78 - 100 fl 85   MCH      26.5 - 33.0 pg 28.4   MCHC      31.5 - 36.5 g/dL 33.3   RDW      10.0 - 15.0 % 13.5   Platelet Count      150 - 450 10e9/L 168   Cholesterol      <200 mg/dL 164   Triglycerides      <150 mg/dL 82   HDL Cholesterol      >39 mg/dL 44   LDL Cholesterol Calculated      <100 mg/dL 104 (H)   Non HDL " Cholesterol      <130 mg/dL 120   Hemoglobin A1C      0 - 5.6 % 4.9       EKG:  EKG was indicated based on risk assessment.  Sinus, normal axis, normal intervals, no acute changes    Pregnancy test needed: No     Assessment and Plan:  1. Pre-operative assessment for left upper eyelid ptosis repair.   The patient is at LOW risk for cardiovascular complications and at LOW risk for pulmonary complications of this MODERATE risk surgery.    --Approval given to proceed with proposed procedure, without further diagnostic evaluation  --Patient is taking an  Ace inhibitor or Angiotensin Receptor Blocker (ARB) and should HOLD this medication for the 24 hours prior to surgery.  --Patient is currently taking    Anticoagulant or Antiplatelet Medication Use  ASPIRIN: Discontinue ASA 7-10 days prior to procedure to reduce bleeding risk.  It should be resumed post-operatively.    --Patient is to take all other scheduled medications on the day of surgery     The patient has been told to not take any aspirin or NSAIDs for 10 days prior to surgery. The patient has been instructed as to what to do with medications prior to surgery.    PAD (peripheral artery disease) (H)  Stable, no acute changes or concerns warranting further evaluation.   - EKG Performed in Clinic w/ Provider Reading Fee    Benign essential hypertension  Well controlled on losartan. The 100 mg tablets should be covered, however we should receive a note if insurance will no longer cover this.   - losartan (COZAAR) 100 MG tablet  Dispense: 90 tablet; Refill: 3  - EKG Performed in Clinic w/ Provider Reading Fee    S/P kidney transplant  Pancreas transplanted (H)  Currently stable, and being monitored by nephrology.     Peripheral polyneuropathy    Preop general physical exam  - EKG Performed in Clinic w/ Provider Reading Fee          Scribe Disclosure:   Verenice NGUYEN, am serving as a scribe to document services personally performed by Rosio Salas MD at  this visit, based upon the provider's statements to me. All documentation has been reviewed by the aforementioned provider prior to being entered into the official medical record.     Portions of this medical record were completed by a scribe. UPON MY REVIEW AND AUTHENTICATION BY ELECTRONIC SIGNATURE, this confirms (a) I performed the applicable clinical services, and (b) the record is accurate.    Rosio Salas MD  Internal Medicine

## 2019-02-28 NOTE — NURSING NOTE
Chief Complaint   Patient presents with     Pre-Op Exam     pre op for eyelid repair 3/15/2019       Gricelda Hernandez EMT at 11:43 AM on 2/28/2019.

## 2019-03-01 ENCOUNTER — HOSPITAL ENCOUNTER (OUTPATIENT)
Facility: AMBULATORY SURGERY CENTER | Age: 59
End: 2019-03-01
Attending: INTERNAL MEDICINE
Payer: MEDICARE

## 2019-03-01 VITALS
HEART RATE: 77 BPM | TEMPERATURE: 98.2 F | OXYGEN SATURATION: 97 % | SYSTOLIC BLOOD PRESSURE: 140 MMHG | HEIGHT: 72 IN | BODY MASS INDEX: 25.73 KG/M2 | WEIGHT: 190 LBS | RESPIRATION RATE: 16 BRPM | DIASTOLIC BLOOD PRESSURE: 78 MMHG

## 2019-03-01 LAB
COLONOSCOPY: NORMAL
INTERPRETATION ECG - MUSE: NORMAL

## 2019-03-01 PROCEDURE — 88305 TISSUE EXAM BY PATHOLOGIST: CPT | Performed by: INTERNAL MEDICINE

## 2019-03-01 RX ORDER — LIDOCAINE 40 MG/G
CREAM TOPICAL
Status: DISCONTINUED | OUTPATIENT
Start: 2019-03-01 | End: 2019-03-01 | Stop reason: HOSPADM

## 2019-03-01 RX ORDER — NALOXONE HYDROCHLORIDE 0.4 MG/ML
.1-.4 INJECTION, SOLUTION INTRAMUSCULAR; INTRAVENOUS; SUBCUTANEOUS
Status: DISCONTINUED | OUTPATIENT
Start: 2019-03-01 | End: 2019-03-02 | Stop reason: HOSPADM

## 2019-03-01 RX ORDER — ONDANSETRON 2 MG/ML
4 INJECTION INTRAMUSCULAR; INTRAVENOUS
Status: DISCONTINUED | OUTPATIENT
Start: 2019-03-01 | End: 2019-03-01 | Stop reason: HOSPADM

## 2019-03-01 RX ORDER — FENTANYL CITRATE 50 UG/ML
INJECTION, SOLUTION INTRAMUSCULAR; INTRAVENOUS PRN
Status: DISCONTINUED | OUTPATIENT
Start: 2019-03-01 | End: 2019-03-01 | Stop reason: HOSPADM

## 2019-03-01 RX ORDER — ONDANSETRON 4 MG/1
4 TABLET, ORALLY DISINTEGRATING ORAL EVERY 6 HOURS PRN
Status: DISCONTINUED | OUTPATIENT
Start: 2019-03-01 | End: 2019-03-02 | Stop reason: HOSPADM

## 2019-03-01 RX ORDER — FLUMAZENIL 0.1 MG/ML
0.2 INJECTION, SOLUTION INTRAVENOUS
Status: ACTIVE | OUTPATIENT
Start: 2019-03-01 | End: 2019-03-01

## 2019-03-01 RX ORDER — ONDANSETRON 2 MG/ML
4 INJECTION INTRAMUSCULAR; INTRAVENOUS EVERY 6 HOURS PRN
Status: DISCONTINUED | OUTPATIENT
Start: 2019-03-01 | End: 2019-03-02 | Stop reason: HOSPADM

## 2019-03-01 ASSESSMENT — MIFFLIN-ST. JEOR: SCORE: 1729.83

## 2019-03-01 NOTE — LETTER
Patient:  Jose Alfredo Tomlinson  :   1960  MRN:     [id number]        Mr. Jose Alfredo Tomlinson  [address] [place] [address] [address]   [place] [address]        2019    Dear Mr. Tomlinson,    Thank you for choosing the [facility] for your healthcare needs.  We appreciate the opportunity to serve you.      Your colonoscopy was completed with removal of five small polyps. The analysis of the polyps that were removed was reassuring that you do not have cancer. No further changes are needed at this time. Please feel free to contact the clinic with any questions.    The following are your recent test results.     Results for orders placed or performed during the hospital encounter of 19   COLONOSCOPY   Result Value Ref Range    COLONOSCOPY       [facility]  [address] [place] [address], [place] [address] [phone]     Endoscopy Department  _______________________________________________________________________________  Patient Name: Jose Alfredo Tomlinson          Procedure Date: 3/1/2019 8:40 AM  MRN: [id number]                       Account Number: [id number]  YOB: 1960              Admit Type: Outpatient  Age: 58                                Gender: Male  Note Status: Finalized                Attending MD: Oleg Solares MD  Total Sedation Time:                    _______________________________________________________________________________     Procedure:           Colonoscopy  Indications:         High risk colon cancer surveillance: Personal history of                        colonic polyps  Providers:           Oleg Solares MD, Wanda Triplett RN  Referring MD:        Oriana Butterfield  Medicines:           Midazolam 2 mg IV, Fentanyl 100 micrograms IV  Complications:       No immediate complic ations.  _______________________________________________________________________________  Procedure:           Pre-Anesthesia Assessment:                        - Prior to the procedure, a History and Physical was                        performed, and patient medications and allergies were                        reviewed. The patient is competent. The risks and                        benefits of the procedure and the sedation options and                        risks were discussed with the patient. All questions                        were answered and informed consent was obtained. Patient                        identification and proposed procedure were verified by                        the physician and the nurse in the pre-procedure area in                        the procedure room. Mental Status Examination: alert and                        oriented. Airway Examination: normal oropharyngeal                        airway and neck mobility. Respiratory Examination: clear                         to auscultation. CV Examination: normal. Prophylactic                        Antibiotics: The patient does not require prophylactic                        antibiotics. Prior Anticoagulants: The patient has taken                        no previous anticoagulant or antiplatelet agents. ASA                        Grade Assessment: II - A patient with mild systemic                        disease. After reviewing the risks and benefits, the                        patient was deemed in satisfactory condition to undergo                        the procedure. The anesthesia plan was to use moderate                        sedation / analgesia (conscious sedation). Immediately                        prior to administration of medications, the patient was                        re-assessed for adequacy to receive sedatives. The heart                        rate, respiratory rate, oxygen saturations, blood                        pressure, adequacy of pulmonary ventilation, and                         response to care were monitored throughout the                         procedure. The physical status of the patient was                        re-assessed after the procedure.                       After obtaining informed consent, the colonoscope was                        passed under direct vision. Throughout the procedure,                        the patient's blood pressure, pulse, and oxygen                        saturations were monitored continuously. The Colonoscope                        was introduced through the anus and advanced to the                        cecum, identified by appendiceal orifice and ileocecal                        valve. The colonoscopy was performed without difficulty.                        The patient tolerated the procedure well. The quality of                        the bowel preparation was good.                                                                                   Findings:       The perianal and digital rectal examinat ions were normal.       Five sessile polyps were found in the cecum. The polyps were less than 1        mm in size. These polyps were removed with a cold snare. Resection and        retrieval were complete.       Multiple small and large-mouthed diverticula were found in the sigmoid        colon.                                                                                   Moderate Sedation:       Moderate (conscious) sedation was administered by the endoscopy nurse        and supervised by the endoscopist. The following parameters were        monitored: oxygen saturation, heart rate, blood pressure, and response        to care. Total physician intraservice time was 34 minutes.  Impression:          - Five less than 1 mm polyps in the cecum, removed with                        a cold snare. Resected and retrieved.                       - Diverticulosis in the sigmoid colon.  Recommendation:      - Discharge patient to home.                       - Resume previous diet.                        - Resume aspirin at  "prior dose tomorrow.                       - Await pathology results.                                                                                     ________________  Oleg Horton MD  3/1/2019 9:37:39 AM  I was physically present for the entire viewing portion of the exam.  __________________________  Signature of teaching physician  Oleg Horton MD  Number of Addenda: 0    Note Initiated On: 3/1/2019 8:40 AM  Scope In:  Scope Out:     Surgical pathology exam   Result Value Ref Range    Copath Report       Patient Name: CORETTA TIERNEY  MR#: [id number]  Specimen #: [id number]  Collected: 3/1/2019  Received: 3/1/2019  Reported: 3/6/2019 16:02  Ordering Phy(s): OLEG HORTON FREEMAN REBECCA    For improved result formatting, select 'View Enhanced Report Format' under   Linked Documents section.    SPECIMEN(S):  Cecal polyp x 5    FINAL DIAGNOSIS:  CECUM, POLYP X 5:  - Fragments of non-dysplastic colonic mucosa, few with lymphoid aggregates    I have personally reviewed all specimens and/or slides, including the   listed special stains, and used them  with my medical judgement to determine or confirm the final diagnosis.    Electronically signed out by:    Kamilah Ellison M.D., [facility]    CLINICAL HISTORY:  Screening colonoscopy    GROSS:  The specimen is received in formalin with proper patient identification,   labeled \"cecum polyp x5\".  The  specimen consists of five polypoid tan pieces of soft tissue ranging from   0.2-0.7 cm in greatest dimension  which are entirely submitted in cassette  A1. (Dictated by: Sayda Diggs   3/1/2019 10:58 AM)    MICROSCOPIC:  Microscopic examination was performed.  Additional levels were examined.    The technical component of this testing [facility], with the professional component performed   at the [facility], [address], "   [place] [address] ([phone])    CPT Codes:  A: 76921-VH0    COLLECTION SITE:  Client: [facility]  Location: [facility] (B)    Resident  [id number]       Please contact your provider if you have any questions or concerns.  We look forward to serving your needs in the future.      Sincerely,      Oriana Butterfield MD   Internal Medicine Resident

## 2019-03-01 NOTE — LETTER
"                  3/7/2019     Jose Alfredo Tomlinson  [address] [place] [address] [address]   [place] [address]      Dear Jose Alfredo:    The pathology results returned from the \"polyp\" removed at your recent colonoscopy.    The \"polyp\" was normal colon tissue with no added risk of colon cancer.      Due to your prior history of polyps, you will require slightly closer monitoring in the future.    Current guidelines recommend that you undergo a follow-up colonoscopy in 5-10 years.    Sincerely,            Oleg Solares MD  [facility]  [address] [place] [id number]  Phone: [phone]  Fax: [phone]     "

## 2019-03-05 DIAGNOSIS — Z94.0 KIDNEY REPLACED BY TRANSPLANT: Primary | ICD-10-CM

## 2019-03-05 DIAGNOSIS — R11.15 NON-INTRACTABLE CYCLICAL VOMITING WITH NAUSEA: ICD-10-CM

## 2019-03-05 DIAGNOSIS — Z94.0 S/P KIDNEY TRANSPLANT: ICD-10-CM

## 2019-03-05 DIAGNOSIS — I10 BENIGN ESSENTIAL HYPERTENSION: ICD-10-CM

## 2019-03-05 DIAGNOSIS — Z94.83 PANCREAS REPLACED BY TRANSPLANT (H): ICD-10-CM

## 2019-03-05 RX ORDER — SULFAMETHOXAZOLE AND TRIMETHOPRIM 400; 80 MG/1; MG/1
1 TABLET ORAL
Qty: 8 TABLET | Refills: 1 | Status: ON HOLD | OUTPATIENT
Start: 2019-03-07 | End: 2019-03-15

## 2019-03-06 LAB — COPATH REPORT: NORMAL

## 2019-03-07 DIAGNOSIS — I10 BENIGN ESSENTIAL HYPERTENSION: ICD-10-CM

## 2019-03-07 DIAGNOSIS — R11.15 NON-INTRACTABLE CYCLICAL VOMITING WITH NAUSEA: ICD-10-CM

## 2019-03-07 DIAGNOSIS — Z94.0 S/P KIDNEY TRANSPLANT: ICD-10-CM

## 2019-03-07 RX ORDER — AMLODIPINE BESYLATE 2.5 MG/1
2.5 TABLET ORAL DAILY
Qty: 90 TABLET | Refills: 3 | Status: SHIPPED | OUTPATIENT
Start: 2019-03-07 | End: 2019-05-07

## 2019-03-07 RX ORDER — MULTIVITAMIN WITH FOLIC ACID 400 MCG
1 TABLET ORAL DAILY
Qty: 90 TABLET | Refills: 3 | Status: SHIPPED | OUTPATIENT
Start: 2019-03-07 | End: 2021-10-14

## 2019-03-10 RX ORDER — AMLODIPINE BESYLATE 2.5 MG/1
TABLET ORAL
Qty: 90 TABLET | Refills: 3 | OUTPATIENT
Start: 2019-03-10

## 2019-03-10 RX ORDER — ASPIRIN 81 MG/1
TABLET, DELAYED RELEASE ORAL
Qty: 90 TABLET | Refills: 0 | OUTPATIENT
Start: 2019-03-10

## 2019-03-10 RX ORDER — MULTIVITAMIN WITH FOLIC ACID 400 MCG
TABLET ORAL
Qty: 90 TABLET | Refills: 0 | OUTPATIENT
Start: 2019-03-10

## 2019-03-12 ENCOUNTER — TELEPHONE (OUTPATIENT)
Dept: OPHTHALMOLOGY | Facility: CLINIC | Age: 59
End: 2019-03-12

## 2019-03-12 NOTE — TELEPHONE ENCOUNTER
Pt scheduled 3-15-19 for surgery   Left message stating pre-op area at surgical center will call 1-3 days prior to surgical date and confirm times    Provided pre-op area number and direct number for any further assistance  Manuel Mccauley RN 9:13 AM 03/12/19          [facility] Call Center    Phone Message    May a detailed message be left on voicemail: yes    Reason for Call: Other: . pt wants to know when he needs to arrive to his surgery so he can plan a ride accordingly. Start time is 11:50am but when is check in?  is the surgeon.    Action Taken: Message routed to:  [facility] ([facility]): SHIELA

## 2019-03-13 NOTE — TELEPHONE ENCOUNTER
RECORDS RECEIVED FROM: Screening for diabetic peripheral neuropathy,Wound of left ankle,Toenail deformity,per [facility]   DATE RECEIVED: 03/13/19    NOTES STATUS DETAILS   OFFICE NOTE from referring provider Internal Dr. Joseph 2/1/19, 2/23/19   OFFICE NOTE from other specialist Internal Dr. Salas 2/28/19   DISCHARGE SUMMARY from hospital N/A    DISCHARGE REPORT from the ER N/A    OPERATIVE REPORT N/A    MEDICATION LIST Internal    IMPLANT RECORD/STICKER N/A    LABS     CBC/DIFF Internal 2/1/19   CULTURES N/A    INJECTIONS DONE IN RADIOLOGY N/A    MRI N/A    CT SCAN N/A    XRAYS (IMAGES & REPORTS) Internal Right ankle: 8/23/18   TUMOR     PATHOLOGY  Slides & report N/A

## 2019-03-15 ENCOUNTER — ANESTHESIA EVENT (OUTPATIENT)
Dept: SURGERY | Facility: CLINIC | Age: 59
End: 2019-03-15
Payer: MEDICARE

## 2019-03-15 ENCOUNTER — ANESTHESIA (OUTPATIENT)
Dept: SURGERY | Facility: CLINIC | Age: 59
End: 2019-03-15
Payer: MEDICARE

## 2019-03-15 ENCOUNTER — HOSPITAL ENCOUNTER (OUTPATIENT)
Facility: CLINIC | Age: 59
Discharge: HOME | End: 2019-03-15
Attending: OPHTHALMOLOGY | Admitting: OPHTHALMOLOGY
Payer: MEDICARE

## 2019-03-15 VITALS
BODY MASS INDEX: 26.45 KG/M2 | DIASTOLIC BLOOD PRESSURE: 100 MMHG | HEART RATE: 76 BPM | RESPIRATION RATE: 16 BRPM | WEIGHT: 195 LBS | SYSTOLIC BLOOD PRESSURE: 154 MMHG | OXYGEN SATURATION: 98 % | TEMPERATURE: 97.5 F

## 2019-03-15 DIAGNOSIS — Z98.890 POSTOPERATIVE EYE STATE: Primary | ICD-10-CM

## 2019-03-15 PROCEDURE — 25800030 ZZH RX IP 258 OP 636: Performed by: NURSE ANESTHETIST, CERTIFIED REGISTERED

## 2019-03-15 PROCEDURE — 71000027 ZZH RECOVERY PHASE 2 EACH 15 MINS: Performed by: OPHTHALMOLOGY

## 2019-03-15 PROCEDURE — 71000012 ZZH RECOVERY PHASE 1 LEVEL 1 FIRST HR: Performed by: OPHTHALMOLOGY

## 2019-03-15 PROCEDURE — 25000125 ZZHC RX 250: Performed by: NURSE ANESTHETIST, CERTIFIED REGISTERED

## 2019-03-15 PROCEDURE — 25000128 H RX IP 250 OP 636: Performed by: NURSE ANESTHETIST, CERTIFIED REGISTERED

## 2019-03-15 PROCEDURE — 25000125 ZZHC RX 250: Performed by: OPHTHALMOLOGY

## 2019-03-15 PROCEDURE — 27210794 ZZH OR GENERAL SUPPLY STERILE: Performed by: OPHTHALMOLOGY

## 2019-03-15 PROCEDURE — 40000170 ZZH STATISTIC PRE-PROCEDURE ASSESSMENT II: Performed by: OPHTHALMOLOGY

## 2019-03-15 PROCEDURE — 37000008 ZZH ANESTHESIA TECHNICAL FEE, 1ST 30 MIN: Performed by: OPHTHALMOLOGY

## 2019-03-15 PROCEDURE — 36000056 ZZH SURGERY LEVEL 3 1ST 30 MIN: Performed by: OPHTHALMOLOGY

## 2019-03-15 PROCEDURE — 25000128 H RX IP 250 OP 636: Performed by: OPHTHALMOLOGY

## 2019-03-15 RX ORDER — OXYCODONE HYDROCHLORIDE 5 MG/1
5 TABLET ORAL EVERY 4 HOURS PRN
Status: DISCONTINUED | OUTPATIENT
Start: 2019-03-15 | End: 2019-03-15 | Stop reason: HOSPADM

## 2019-03-15 RX ORDER — ERYTHROMYCIN 5 MG/G
OINTMENT OPHTHALMIC PRN
Status: DISCONTINUED | OUTPATIENT
Start: 2019-03-15 | End: 2019-03-15 | Stop reason: HOSPADM

## 2019-03-15 RX ORDER — FENTANYL CITRATE 50 UG/ML
INJECTION, SOLUTION INTRAMUSCULAR; INTRAVENOUS PRN
Status: DISCONTINUED | OUTPATIENT
Start: 2019-03-15 | End: 2019-03-15

## 2019-03-15 RX ORDER — TETRACAINE HYDROCHLORIDE 5 MG/ML
SOLUTION OPHTHALMIC PRN
Status: DISCONTINUED | OUTPATIENT
Start: 2019-03-15 | End: 2019-03-15 | Stop reason: HOSPADM

## 2019-03-15 RX ORDER — ONDANSETRON 4 MG/1
4 TABLET, ORALLY DISINTEGRATING ORAL EVERY 30 MIN PRN
Status: DISCONTINUED | OUTPATIENT
Start: 2019-03-15 | End: 2019-03-15 | Stop reason: HOSPADM

## 2019-03-15 RX ORDER — ONDANSETRON 2 MG/ML
INJECTION INTRAMUSCULAR; INTRAVENOUS PRN
Status: DISCONTINUED | OUTPATIENT
Start: 2019-03-15 | End: 2019-03-15

## 2019-03-15 RX ORDER — SODIUM CHLORIDE, SODIUM LACTATE, POTASSIUM CHLORIDE, CALCIUM CHLORIDE 600; 310; 30; 20 MG/100ML; MG/100ML; MG/100ML; MG/100ML
INJECTION, SOLUTION INTRAVENOUS CONTINUOUS
Status: DISCONTINUED | OUTPATIENT
Start: 2019-03-15 | End: 2019-03-15 | Stop reason: HOSPADM

## 2019-03-15 RX ORDER — LIDOCAINE HYDROCHLORIDE 20 MG/ML
INJECTION, SOLUTION INFILTRATION; PERINEURAL PRN
Status: DISCONTINUED | OUTPATIENT
Start: 2019-03-15 | End: 2019-03-15

## 2019-03-15 RX ORDER — ONDANSETRON 2 MG/ML
4 INJECTION INTRAMUSCULAR; INTRAVENOUS EVERY 30 MIN PRN
Status: DISCONTINUED | OUTPATIENT
Start: 2019-03-15 | End: 2019-03-15 | Stop reason: HOSPADM

## 2019-03-15 RX ORDER — FENTANYL CITRATE 50 UG/ML
25-50 INJECTION, SOLUTION INTRAMUSCULAR; INTRAVENOUS
Status: DISCONTINUED | OUTPATIENT
Start: 2019-03-15 | End: 2019-03-15 | Stop reason: HOSPADM

## 2019-03-15 RX ORDER — HYDROMORPHONE HYDROCHLORIDE 1 MG/ML
.3-.5 INJECTION, SOLUTION INTRAMUSCULAR; INTRAVENOUS; SUBCUTANEOUS EVERY 10 MIN PRN
Status: DISCONTINUED | OUTPATIENT
Start: 2019-03-15 | End: 2019-03-15 | Stop reason: HOSPADM

## 2019-03-15 RX ORDER — SODIUM CHLORIDE, SODIUM LACTATE, POTASSIUM CHLORIDE, CALCIUM CHLORIDE 600; 310; 30; 20 MG/100ML; MG/100ML; MG/100ML; MG/100ML
INJECTION, SOLUTION INTRAVENOUS CONTINUOUS PRN
Status: DISCONTINUED | OUTPATIENT
Start: 2019-03-15 | End: 2019-03-15

## 2019-03-15 RX ORDER — NALOXONE HYDROCHLORIDE 0.4 MG/ML
.1-.4 INJECTION, SOLUTION INTRAMUSCULAR; INTRAVENOUS; SUBCUTANEOUS
Status: DISCONTINUED | OUTPATIENT
Start: 2019-03-15 | End: 2019-03-15 | Stop reason: HOSPADM

## 2019-03-15 RX ORDER — PROPOFOL 10 MG/ML
INJECTION, EMULSION INTRAVENOUS PRN
Status: DISCONTINUED | OUTPATIENT
Start: 2019-03-15 | End: 2019-03-15

## 2019-03-15 RX ORDER — NEOMYCIN POLYMYXIN B SULFATES AND DEXAMETHASONE 3.5; 10000; 1 MG/ML; [USP'U]/ML; MG/ML
1 SUSPENSION/ DROPS OPHTHALMIC 4 TIMES DAILY
Qty: 5 ML | Refills: 0 | Status: SHIPPED | OUTPATIENT
Start: 2019-03-15 | End: 2019-03-22

## 2019-03-15 RX ORDER — MEPERIDINE HYDROCHLORIDE 25 MG/ML
12.5 INJECTION INTRAMUSCULAR; INTRAVENOUS; SUBCUTANEOUS
Status: DISCONTINUED | OUTPATIENT
Start: 2019-03-15 | End: 2019-03-15 | Stop reason: HOSPADM

## 2019-03-15 RX ADMIN — SODIUM CHLORIDE, POTASSIUM CHLORIDE, SODIUM LACTATE AND CALCIUM CHLORIDE: 600; 310; 30; 20 INJECTION, SOLUTION INTRAVENOUS at 12:15

## 2019-03-15 RX ADMIN — LIDOCAINE HYDROCHLORIDE 40 MG: 20 INJECTION, SOLUTION INFILTRATION; PERINEURAL at 12:20

## 2019-03-15 RX ADMIN — MIDAZOLAM 2 MG: 1 INJECTION INTRAMUSCULAR; INTRAVENOUS at 12:16

## 2019-03-15 RX ADMIN — PROPOFOL 40 MG: 10 INJECTION, EMULSION INTRAVENOUS at 12:20

## 2019-03-15 RX ADMIN — ONDANSETRON 4 MG: 2 INJECTION INTRAMUSCULAR; INTRAVENOUS at 12:24

## 2019-03-15 RX ADMIN — FENTANYL CITRATE 50 MCG: 50 INJECTION, SOLUTION INTRAMUSCULAR; INTRAVENOUS at 12:16

## 2019-03-15 ASSESSMENT — COPD QUESTIONNAIRES: COPD: 0

## 2019-03-15 NOTE — BRIEF OP NOTE
[facility]    Brief Operative Note    Pre-operative diagnosis: INVOLUTIONAL PTOSIS AQUIRED LEFT EYE  Post-operative diagnosis Same  Procedure: Procedure(s):  LEFT UPPER EYELID PTOSIS REPAIR  Surgeon: Surgeon(s) and Role:     * Rj Jose MD - Primary   Sim Allen MD - Assisting  Anesthesia: Combined MAC with Local   Estimated blood loss: Minimal  Drains: None  Specimens: * No specimens in log *  Findings:   As expected.  Complications: None.  Implants: None.      Sim Allen MD

## 2019-03-15 NOTE — OP NOTE
PREOPERATIVE DIAGNOSIS: Left upper eyelid ptosis.   POSTOPERATIVE DIAGNOSIS:  Left upper eyelid ptosis.   PROCEDURE:Left upper eyelid ptosis repair by Fajardo's muscle conjunctival resection.   SURGEON: Rj Jose MD  ASSISTANT: Sim Tijerina MD  ANESTHESIA: Monitored with local infiltration of a 50/50 mixture of 2% lidocaine with epinephrine and 0.5% Marcaine.   COMPLICATIONS: None.   ESTIMATED BLOOD LOSS: 1 mL   HISTORY: Jose Alfredo Tomlinson presented with upper lid ptosis interfering with the superior visual field and activities of daily living. After the risks, benefits and alternatives to the proposed procedure were explained, informed consent was obtained.   PROCEDURE: The patient was brought to the operating room and placed supine on the operating table. IV sedation was given. The left upper eyelid was infiltrated with local anesthetic and  was prepped and draped in the typical sterile ophthalmic fashion. Atttention was directed to the left  side.  A 4-0 silk suture was placed through the eyelid margin and the eyelid everted over a Desmarres retractor. A 6-0 silk suture was then threaded through the conjunctiva and Fajardo's muscle 4.5 mm from the superior tarsal border in order to excise a total of 9 mm muscle and conjunctiva. These sutures were used to elevate the conjunctiva and Fajardo's muscle which was gently peeled from the underlying levator muscle. The Putterman clamp was used and clamped over the elevated tissues. A 6-0 plain gut suture was run in a horizontal mattress fashion 1 mm below the clamp from lateral to medial, then medial to lateral. The elevated tissues were excised with a 15 blade. The sutures were then externalized and tied in the lid crease. The 4-0 silk suture was removed. The lid was reverted to its normal position and ophthalmic antibiotic ointment placed in the eye.   The patient tolerated the procedure well and left the operating room in stable condition.   Rj Jose  MD

## 2019-03-15 NOTE — DISCHARGE INSTRUCTIONS
Post-operative Instructions    Ophthalmic Plastic and Reconstructive Surgery  Rj Jose M.D.    All instructions apply to the operated eye(s) or eyelid(s)      What to expect after surgery:    Thre will be some swelling, bruising, and likely a black eye (even into the lower eyelids and cheeks). Also expect crusting and discharge from the eye and/or incisions.     A small amount of surface bleeding is normal for the first 48 hours after surgery.    You may notice some bloody tears for the first few days after surgery. This is normal.    Your eye(s) and eyelid(s) may be painful and tender. This is normal after surgery. Use the pain medication as prescribed. If your pain does not improve despite the medication, contact the office.    Wound care and personal care:    If a patch or bandage has been placed, please leave this in place until seen in clinic. Prevent the bandage from getting wet.     Apply ice compresses 15 minutes on 15 minutes off while awake for the first 2 days after surgery, then switch to warm compresses 4 times a day until seen by your physician.     For warm packs you can place a cup of dry uncooked rice in a clean cotton sock. Place sock in microwave 30 seconds to one minute. Next place the warm sock into a plastic bag and wrap the bag with clean warm wet washcloth and place over operated eye.      You may shower or wash your hair the day after surgery. Do not bathe or go swimming for 1 week to prevent contamination of your wounds.    Do not apply make-up to the eyes or eyelids for 2 weeks after surgery.      Activity restrictions and driving:    Avoid heavy lifting, bending, exercise or strenuous activity for 1 week after surgery.    You may resume other activities and return to work as tolerated.    You may not resume driving until have you stopped using narcotic pain medications(such as Norco, Percocet, Tylenol #3).    Medications:    Restart all your regular home medications and eye drops  today. If you take Plavix or Aspirin on a regular basis, wait for 3 days after your surgery before restarting these in order to decrease the risk of bleeding complications.    Avoid aspirin and aspirin-like medications (Motrin, Aleve, Ibuprofen, Tracey-Hamer etc) for 5 days to reduce the risk of bleeding. You may take Tylenol (acetaminophen) for pain.    In addition to your home medications, take the following post-operative medications as prescribed by your physician:    Instill eye drops (Maxitrol) four times a day in the operative eye until the bottle finished.     Take 1 to 2 pain pills (tylenol) as needed for pain up to every 4 hours.      WARNING: You must not take more than 4,000 mg of acetaminophen per 24-hour period. This is equivalent to 6 tablets of Darvocet, 8 tablets of Vicodin, or 12 tablets of Norco, Percocet or Tylenol #3. If you take other over-the-counter medications containing acetaminophen, you must take the amount of acetaminophen into account and reduce the number of prescribed pain pills accordingly.    Contact information and follow-up:    Return to the Eye Clinic for a follow-up appointment with your physician as  scheduled. If no appointment has been scheduled, call [phone] for an  appointment with Dr. Jose within 1 to 2 weeks from your date of surgery.      For severe pain, bleeding, or loss of vision, call the Eye Clinic at [phone].    After hours or on weekends and holidays, call [phone] and ask to speak with the ophthalmologist on call.      Same Day Surgery Discharge Instructions for  Sedation and General Anesthesia       It's not unusual to feel dizzy, light-headed or faint for up to 24 hours after surgery or while taking pain medication.  If you have these symptoms: sit for a few minutes before standing and have someone assist you when you get up to walk or use the bathroom.      You should rest and relax for the next 24 hours. We recommend you make  arrangements to have an adult stay with you for at least 24 hours after your discharge.  Avoid hazardous and strenuous activity.      DO NOT DRIVE any vehicle or operate mechanical equipment for 24 hours following the end of your surgery.  Even though you may feel normal, your reactions may be affected by the medication you have received.      Do not drink alcoholic beverages for 24 hours following surgery.       Slowly progress to your regular diet as you feel able. It's not unusual to feel nauseated and/or vomit after receiving anesthesia.  If you develop these symptoms, drink clear liquids (apple juice, ginger ale, broth, 7-up, etc. ) until you feel better.  If your nausea and vomiting persists for 24 hours, please notify your surgeon.        All narcotic pain medications, along with inactivity and anesthesia, can cause constipation. Drinking plenty of liquids and increasing fiber intake will help.      For any questions of a medical nature, call your surgeon.      Do not make important decisions for 24 hours.      If you had general anesthesia, you may have a sore throat for a couple of days related to the breathing tube used during surgery.  You may use Cepacol lozenges to help with this discomfort.  If it worsens or if you develop a fever, contact your surgeon.       If you feel your pain is not well managed with the pain medications prescribed by your surgeon, please contact your surgeon's office to let them know so they can address your concerns.     May use Erythromycin on incision for irritation as needed

## 2019-03-15 NOTE — ANESTHESIA PREPROCEDURE EVALUATION
Anesthesia Pre-Procedure Evaluation    Patient: Jose Alfredo Tomlinson   MRN: [id number] : 1960          Preoperative Diagnosis: INVOLUTIONAL PTOSIS AQUIRED LEFT EYE    Procedure(s):  LEFT UPPER EYELID PTOSIS REPAIR    Past Medical History:   Diagnosis Date     Cataracts      Gastroparesis      GERD (gastroesophageal reflux disease)      History of diabetes mellitus, type I      Hyperlipidemia      Hypertension      Pancreas transplanted (H)     hx of rejection in past      Peripheral neuropathy      S/P kidney transplant      Past Surgical History:   Procedure Laterality Date     CATARACT IOL, RT/LT Bilateral      COLONOSCOPY  2013    Procedure: COMBINED COLONOSCOPY, SINGLE BIOPSY/POLYPECTOMY BY BIOPSY;  COLONOSCOPY;  Surgeon: Isac Colby MD;  Location:  GI     ESOPHAGOSCOPY, GASTROSCOPY, DUODENOSCOPY (EGD), COMBINED  2012    Procedure:COMBINED ESOPHAGOSCOPY, GASTROSCOPY, DUODENOSCOPY (EGD); Surgeon:GAB MARTIN; Location: GI     TRANSPLANT      K/P       Anesthesia Evaluation     .             ROS/MED HX    ENT/Pulmonary:     (+)SHANE risk factors hypertension, , . .   (-) asthma and COPD   Neurologic:     (+)neuropathy     Cardiovascular:     (+) Dyslipidemia, hypertension----. : . . . :. . Previous cardiac testing Echodate:2015results:Normal range EFdate: results: date: results: date: results:          METS/Exercise Tolerance:     Hematologic:         Musculoskeletal:         GI/Hepatic:     (+) GERD       Renal/Genitourinary:         Endo: Comment: S/p kidney/pancreas transplant, former diabetic (resolved following transplant)        Psychiatric:         Infectious Disease:         Malignancy:         Other:                          Physical Exam      Airway   Mallampati: II  TM distance: >3 FB  Neck ROM: full    Dental     Cardiovascular   Rhythm and rate: regular      Pulmonary    breath sounds clear to auscultation            Lab Results   Component Value Date     WBC 6.3 02/01/2019    HGB 13.8 02/01/2019    HCT 41.5 02/01/2019     02/01/2019    CRP 4.2 08/02/2014     02/01/2019    POTASSIUM 4.3 02/01/2019    CHLORIDE 113 (H) 02/01/2019    CO2 20 02/01/2019    BUN 28 02/01/2019    CR 0.94 02/01/2019    GLC 86 02/01/2019    WENDI 8.8 02/01/2019    PHOS 4.1 11/14/2010    MAG 1.8 11/14/2010    ALBUMIN 4.0 06/26/2016    PROTTOTAL 7.0 06/26/2016    ALT 26 06/26/2016    AST 19 06/26/2016    ALKPHOS 98 06/26/2016    BILITOTAL 0.4 06/26/2016    LIPASE 123 02/01/2019    AMYLASE 60 02/01/2019    PTT 30 11/12/2010    INR 1.10 06/26/2016    FIBR 563 (H) 10/25/2006       Preop Vitals  BP Readings from Last 3 Encounters:   03/01/19 140/78   02/28/19 129/83   02/23/19 176/74    Pulse Readings from Last 3 Encounters:   03/01/19 77   02/28/19 74   02/23/19 79      Resp Readings from Last 3 Encounters:   03/01/19 16   02/23/19 18   08/23/18 16    SpO2 Readings from Last 3 Encounters:   03/01/19 97%   02/28/19 96%   02/01/19 100%      Temp Readings from Last 1 Encounters:   03/01/19 36.8  C (98.2  F) (Temporal)    Ht Readings from Last 1 Encounters:   03/01/19 1.829 m (6')      Wt Readings from Last 1 Encounters:   03/01/19 86.2 kg (190 lb)    Estimated body mass index is 25.77 kg/m  as calculated from the following:    Height as of 3/1/19: 1.829 m (6').    Weight as of 3/1/19: 86.2 kg (190 lb).       Anesthesia Plan      History & Physical Review  History and physical reviewed and following examination; no interval change.    ASA Status:  2 .    NPO Status:  > 8 hours    Plan for MAC Reason for MAC:  Procedure to face, neck, head or breast  PONV prophylaxis:  Ondansetron (or other 5HT-3)       Postoperative Care  Postoperative pain management:  Multi-modal analgesia.      Consents  Anesthetic plan, risks, benefits and alternatives discussed with:  Patient..                 Viral Cantu MD

## 2019-03-15 NOTE — ANESTHESIA CARE TRANSFER NOTE
Patient: Jose Alfredo Tomlinson    Procedure(s):  LEFT UPPER EYELID PTOSIS REPAIR    Diagnosis: INVOLUTIONAL PTOSIS AQUIRED LEFT EYE  Diagnosis Additional Information: No value filed.    Anesthesia Type:   MAC     Note:  Airway :Room Air  Patient transferred to:PACU  Comments: At end of procedure, spontaneous respirations, patient alert to voice, able to follow commands. Patient breathing room air to PACU. Oxygen tubing connected to wall O2 in PACU, SpO2, NiBP, and EKG monitors and alarms on and functioning, Amira Hugger warmer connected to patient gown, report on patient's clinical status given to PACU RN, RN questions answered.Handoff Report: Identifed the Patient, Identified the Reponsible Provider, Reviewed the pertinent medical history, Discussed the surgical course, Reviewed Intra-OP anesthesia mangement and issues during anesthesia, Set expectations for post-procedure period and Allowed opportunity for questions and acknowledgement of understanding      Vitals: (Last set prior to Anesthesia Care Transfer)    CRNA VITALS  3/15/2019 1206 - 3/15/2019 1240      3/15/2019             Resp Rate (set):  10                Electronically Signed By: RENETTA Brooke CRNA  March 15, 2019  12:40 PM

## 2019-03-15 NOTE — ANESTHESIA POSTPROCEDURE EVALUATION
Patient: Jose Alfredo Tomlinson    Procedure(s):  LEFT UPPER EYELID PTOSIS REPAIR    Diagnosis:INVOLUTIONAL PTOSIS AQUIRED LEFT EYE  Diagnosis Additional Information: No value filed.    Anesthesia Type:  MAC    Note:  Anesthesia Post Evaluation    Patient location during evaluation: PACU  Patient participation: Able to fully participate in evaluation  Level of consciousness: awake and alert  Pain management: adequate  Airway patency: patent  Cardiovascular status: acceptable and hemodynamically stable  Respiratory status: acceptable and room air  Hydration status: euvolemic  PONV: none     Anesthetic complications: None          Last vitals:  Vitals:    03/15/19 1245 03/15/19 1300 03/15/19 1315   BP: 142/84 (!) 145/92 134/82   Pulse: 71 71 76   Resp: 16 13 12   Temp:      SpO2: 96% 99% 97%         Electronically Signed By: Viral Cantu MD  March 15, 2019  1:51 PM

## 2019-03-22 ENCOUNTER — PRE VISIT (OUTPATIENT)
Dept: ORTHOPEDICS | Facility: CLINIC | Age: 59
End: 2019-03-22

## 2019-03-22 ENCOUNTER — OFFICE VISIT (OUTPATIENT)
Dept: ORTHOPEDICS | Facility: CLINIC | Age: 59
End: 2019-03-22
Payer: MEDICARE

## 2019-03-22 VITALS — HEIGHT: 71 IN | WEIGHT: 190.6 LBS | BODY MASS INDEX: 26.68 KG/M2

## 2019-03-22 DIAGNOSIS — B35.1 OM (ONYCHOMYCOSIS): ICD-10-CM

## 2019-03-22 DIAGNOSIS — G63 POLYNEUROPATHY ASSOCIATED WITH UNDERLYING DISEASE (H): Primary | ICD-10-CM

## 2019-03-22 DIAGNOSIS — E10.40 DIABETIC NEUROPATHY, TYPE I DIABETES MELLITUS (H): ICD-10-CM

## 2019-03-22 DIAGNOSIS — M21.41 PES PLANUS OF BOTH FEET: ICD-10-CM

## 2019-03-22 DIAGNOSIS — M21.42 PES PLANUS OF BOTH FEET: ICD-10-CM

## 2019-03-22 ASSESSMENT — ENCOUNTER SYMPTOMS
EYE WATERING: 1
EYE IRRITATION: 1
EYE REDNESS: 1
NAIL CHANGES: 1
DOUBLE VISION: 0
POOR WOUND HEALING: 0
SKIN CHANGES: 0
EYE PAIN: 1

## 2019-03-22 ASSESSMENT — MIFFLIN-ST. JEOR: SCORE: 1720.81

## 2019-03-22 NOTE — NURSING NOTE
"Reason For Visit:   Chief Complaint   Patient presents with     Consult     Toe nail deformity. ambikaothy doesn't bother him anymore       Ht 1.81 m (5' 11.26\")   Wt 86.5 kg (190 lb 9.6 oz)   BMI 26.39 kg/m      Pain Assessment  Patient Currently in Pain: [place]    Tami Burleson ATC    "

## 2019-03-22 NOTE — LETTER
3/22/2019       RE: Jose Alfredo Tomlinson  [address]  [place] [address]     Dear Colleague,    Thank you for referring your patient, Jose Alfredo Tomlinson, to the [facility] Please see a copy of my visit note below.    Date of Service: 3/22/2019    Chief Complaint   Patient presents with     Consult     Toe nail deformity. neuropothy doesn't bother him anymore        HPI: Jose Alfredo is a 58 year old male who presents today for a diabetic foot exam and management. He relates that he was dx'ed with diabetes in the 80s. He is s/p kidney and pancreas transplant. He relates that he knows that he has neuropathy. He does wear stirrup braces BL. Relates that his nails usually do not grow. Relates that he sometimes gets a wound on the medial malleolus on the left ankle. This has healed over. He thinks it is from the rub of the old shoes. Does get intermittent tingling and burning in his feet.     PMH:   Past Medical History:   Diagnosis Date     Cataracts      Gastroparesis      GERD (gastroesophageal reflux disease)      History of diabetes mellitus, type I      Hyperlipidemia      Hypertension      Pancreas transplanted (H) 2007    hx of rejection in past      Peripheral neuropathy      S/P kidney transplant 2007       PSxH:   Past Surgical History:   Procedure Laterality Date     CATARACT IOL, RT/LT Bilateral      COLONOSCOPY  12/31/2013    Procedure: COMBINED COLONOSCOPY, SINGLE BIOPSY/POLYPECTOMY BY BIOPSY;  COLONOSCOPY;  Surgeon: Isac Colby MD;  Location:  GI     ESOPHAGOSCOPY, GASTROSCOPY, DUODENOSCOPY (EGD), COMBINED  1/20/2012    Procedure:COMBINED ESOPHAGOSCOPY, GASTROSCOPY, DUODENOSCOPY (EGD); Surgeon:GAB MARTIN; [facility]     REPAIR PTOSIS Left 3/15/2019    Procedure: LEFT UPPER EYELID PTOSIS REPAIR;  Surgeon: Rj Jose MD;  Location:  OR     TRANSPLANT  2007    K/P       Allergies: Patient has  no known allergies.    SH:   Social History     Socioeconomic History     Marital status: Single     Spouse name: Not on file     Number of children: Not on file     Years of education: Not on file     Highest education level: Not on file   Occupational History     Not on file   Social Needs     Financial resource strain: Not on file     Food insecurity:     Worry: Not on file     Inability: Not on file     Transportation needs:     Medical: Not on file     Non-medical: Not on file   Tobacco Use     Smoking status: Never Smoker     Smokeless tobacco: Never Used   Substance and Sexual Activity     Alcohol use: No     Drug use: No     Sexual activity: Yes     Partners: Female   Lifestyle     Physical activity:     Days per week: Not on file     Minutes per session: Not on file     Stress: Not on file   Relationships     Social connections:     Talks on phone: Not on file     Gets together: Not on file     Attends Caodaism service: Not on file     Active member of club or organization: Not on file     Attends meetings of clubs or organizations: Not on file     Relationship status: Not on file     Intimate partner violence:     Fear of current or ex partner: Not on file     Emotionally abused: Not on file     Physically abused: Not on file     Forced sexual activity: Not on file   Other Topics Concern     Parent/sibling w/ CABG, MI or angioplasty before 65F 55M? Not Asked   Social History Narrative    Lives alone        FH:   Family History   Problem Relation Age of Onset     Diabetes Son      Arthritis Mother      Diabetes Father      Diabetes Paternal Grandfather      Cancer Paternal Grandfather         Skin cancer     Cancer Maternal Grandfather         Skin cancer     Glaucoma No family hx of      Macular Degeneration No family hx of        Objective:  Data Unavailable Data Unavailable Data Unavailable Data Unavailable Data Unavailable 0 lbs 0 oz    PT and DP pulses are not palpable bilaterally. DP pulses are  monophasic with the Doppler and PTs are biphasic. CRT is 4 seconds. Diminished pedal hair.   Gross sensation is absent bilaterally. Protective and vibratory sensation are absent BL.   Equinus is noted bilaterally. No pain with active or passive ROM of the ankle, MTJ, 1st ray, or halluces bilaterally,. Pes planus  Nails on the lesser digits are very short bilaterally. Mycotic right hallux nail. No open lesions are noted. Scar noted to the left medial malleolus. Not a punched out scar that would indicate pervious arterial ulcer.     Assessment: DM with resolution with pancreas transplant.   Onychoymocosis.  Pes planus  H/o wounds to the LE.     Plan:  - Pt seen and evaluated.  - He needs a new pair of shoes but has an rx for them already.   - No wounds to treat today. If he gets another open lesion, he should reappoint ASAP.   - Right hallux slightly trimmed today.   - See again in 6 months or sooner if problems arise.     Again, thank you for allowing me to participate in the care of your patient.      Sincerely,    Bismark Ordoñez DPM

## 2019-03-22 NOTE — PROGRESS NOTES
Date of Service: 3/22/2019    Chief Complaint   Patient presents with     Consult     Toe nail deformity. leland doesn't bother him anymore          HPI: Jose Alfredo is a 58 year old male who presents today for a diabetic foot exam and management. He relates that he was dx'ed with diabetes in the 80s. He is s/p kidney and pancreas transplant. He relates that he knows that he has neuropathy. He does wear stirrup braces BL. Relates that his nails usually do not grow. Relates that he sometimes gets a wound on the medial malleolus on the left ankle. This has healed over. He thinks it is from the rub of the old shoes. Does get intermittent tingling and burning in his feet.     Review of Systems: Answers for HPI/ROS submitted by the patient on 3/22/2019   General Symptoms: No  Skin Symptoms: Yes  HENT Symptoms: No  EYE SYMPTOMS: Yes  HEART SYMPTOMS: No  LUNG SYMPTOMS: No  INTESTINAL SYMPTOMS: No  URINARY SYMPTOMS: No  REPRODUCTIVE SYMPTOMS: No  SKELETAL SYMPTOMS: No  BLOOD SYMPTOMS: No  NERVOUS SYSTEM SYMPTOMS: No  MENTAL HEALTH SYMPTOMS: No  Changes in hair: No  Changes in moles/birth marks: No  Itching: No  Rashes: No  Changes in nails: Yes  Acne: No  Change in facial hair: No  Warts: No  Non-healing sores: No  Scarring: No  Flaking of skin: Yes  Color changes of hands/feet in cold : No  Sun sensitivity: No  Skin thickening: No  Eye pain: Yes  Vision loss: Yes  Dry eyes: Yes  Watery eyes: Yes  Eye bulging: No  Double vision: No  Flashing of lights: No  Spots: No  Floaters: No  Redness: Yes  Crossed eyes: No  Tunnel Vision: No  Yellowing of eyes: No  Eye irritation: Yes       PMH:   Past Medical History:   Diagnosis Date     Cataracts      Gastroparesis      GERD (gastroesophageal reflux disease)      History of diabetes mellitus, type I      Hyperlipidemia      Hypertension      Pancreas transplanted (H) 2007    hx of rejection in past      Peripheral neuropathy      S/P kidney transplant 2007       PSxH:   Past Surgical  History:   Procedure Laterality Date     CATARACT IOL, RT/LT Bilateral      COLONOSCOPY  12/31/2013    Procedure: COMBINED COLONOSCOPY, SINGLE BIOPSY/POLYPECTOMY BY BIOPSY;  COLONOSCOPY;  Surgeon: Isac Colby MD;  Location:  GI     ESOPHAGOSCOPY, GASTROSCOPY, DUODENOSCOPY (EGD), COMBINED  1/20/2012    Procedure:COMBINED ESOPHAGOSCOPY, GASTROSCOPY, DUODENOSCOPY (EGD); Surgeon:GAB MARTIN; [facility]     REPAIR PTOSIS Left 3/15/2019    Procedure: LEFT UPPER EYELID PTOSIS REPAIR;  Surgeon: Rj Jose MD;  Location:  OR     TRANSPLANT  2007    K/P       Allergies: Patient has no known allergies.    SH:   Social History     Socioeconomic History     Marital status: Single     Spouse name: Not on file     Number of children: Not on file     Years of education: Not on file     Highest education level: Not on file   Occupational History     Not on file   Social Needs     Financial resource strain: Not on file     Food insecurity:     Worry: Not on file     Inability: Not on file     Transportation needs:     Medical: Not on file     Non-medical: Not on file   Tobacco Use     Smoking status: Never Smoker     Smokeless tobacco: Never Used   Substance and Sexual Activity     Alcohol use: No     Drug use: No     Sexual activity: Yes     Partners: Female   Lifestyle     Physical activity:     Days per week: Not on file     Minutes per session: Not on file     Stress: Not on file   Relationships     Social connections:     Talks on phone: Not on file     Gets together: Not on file     Attends Baptism service: Not on file     Active member of club or organization: Not on file     Attends meetings of clubs or organizations: Not on file     Relationship status: Not on file     Intimate partner violence:     Fear of current or ex partner: Not on file     Emotionally abused: Not on file     Physically abused: Not on file     Forced sexual activity: Not on file   Other Topics Concern      Parent/sibling w/ CABG, MI or angioplasty before 65F 55M? Not Asked   Social History Narrative    Lives alone        FH:   Family History   Problem Relation Age of Onset     Diabetes Son      Arthritis Mother      Diabetes Father      Diabetes Paternal Grandfather      Cancer Paternal Grandfather         Skin cancer     Cancer Maternal Grandfather         Skin cancer     Glaucoma No family hx of      Macular Degeneration No family hx of        Objective:  Data Unavailable Data Unavailable Data Unavailable Data Unavailable Data Unavailable 0 lbs 0 oz    PT and DP pulses are not palpable bilaterally. DP pulses are monophasic with the Doppler and PTs are biphasic. CRT is 4 seconds. Diminished pedal hair.   Gross sensation is absent bilaterally. Protective and vibratory sensation are absent BL.   Equinus is noted bilaterally. No pain with active or passive ROM of the ankle, MTJ, 1st ray, or halluces bilaterally,. Pes planus  Nails on the lesser digits are very short bilaterally. Mycotic right hallux nail. No open lesions are noted. Scar noted to the left medial malleolus. Not a punched out scar that would indicate pervious arterial ulcer.     Assessment: DM with resolution with pancreas transplant.   Onychoymocosis.  Pes planus  H/o wounds to the LE.     Plan:  - Pt seen and evaluated.  - He needs a new pair of shoes but has an rx for them already.   - No wounds to treat today. If he gets another open lesion, he should reappoint ASAP.   - Right hallux slightly trimmed today.   - See again in 6 months or sooner if problems arise.

## 2019-03-25 ENCOUNTER — OFFICE VISIT (OUTPATIENT)
Dept: OPHTHALMOLOGY | Facility: CLINIC | Age: 59
End: 2019-03-25
Payer: MEDICARE

## 2019-03-25 DIAGNOSIS — H02.402 INVOLUTIONAL PTOSIS, ACQUIRED, LEFT: Primary | ICD-10-CM

## 2019-03-25 ASSESSMENT — TONOMETRY
OS_IOP_MMHG: 15
OD_IOP_MMHG: 18
IOP_METHOD: ICARE

## 2019-03-25 ASSESSMENT — VISUAL ACUITY
OD_SC+: -2
OD_SC: 20/20
OS_SC: 20/40
OS_SC+: +
METHOD: SNELLEN - LINEAR

## 2019-03-25 ASSESSMENT — EXTERNAL EXAM - LEFT EYE: OS_EXAM: NORMAL

## 2019-03-25 NOTE — NURSING NOTE
Chief Complaints and History of Present Illnesses   Patient presents with     Post Op (Ophthalmology) Both Eyes     Chief Complaint(s) and History of Present Illness(es)     Post Op (Ophthalmology) Both Eyes     Laterality: left eye    Frequency: constantly    Course: stable    Associated symptoms: Negative for eye pain    Pain scale: 0/10              Comments     S/p CESAR ptosis repair 3/15/19. Some strain with right eye when looking at computer. Left lid is still a little droopy. No pain in left eye. Finished with eye drops and ro.    Sophia Castaneda COT 8:05 AM March 25, 2019

## 2019-03-25 NOTE — PROGRESS NOTES
Assessment    Jose Alfredo Tomlinson is a 58 year old male with the following diagnoses:   1. Involutional ptosis, acquired, left         S/p CESAR MMCR 3/15/2019  -Healing well  -Lid in excellent position -> minimal residual edema  -Pt notes symptomatic improvement in vision and visual field      PLAN:  Looks great, healing well  Warm soaks 3-4x daily until all edema and ecchymosis resolve  Alternate with ice packs for comfort as needed    Return to clinic in 6-8 weeks      Frederick Vazquez MD, TIGRE  Oculofacial Plastics and Orbit Surgery Fellow      Attestation:  I personally obtained the chief complaint(s) and history of present illness.  I confirmed and edited as necessary the elements in the note as documented by others; and I examined the patient myself.  I formulated and edited as necessary the assessment and plan and discussed the findings and management plan with the patient and/or family.   -Frederick Vazquez MD, TIGRE  Oculofacial Plastics and Orbit Surgery Fellow

## 2019-04-02 DIAGNOSIS — R11.15 NON-INTRACTABLE CYCLICAL VOMITING WITH NAUSEA: ICD-10-CM

## 2019-04-02 DIAGNOSIS — Z94.83 PANCREAS REPLACED BY TRANSPLANT (H): ICD-10-CM

## 2019-04-02 DIAGNOSIS — Z94.0 KIDNEY REPLACED BY TRANSPLANT: ICD-10-CM

## 2019-04-02 DIAGNOSIS — Z94.0 S/P KIDNEY TRANSPLANT: ICD-10-CM

## 2019-04-02 DIAGNOSIS — K21.9 ESOPHAGEAL REFLUX: Primary | ICD-10-CM

## 2019-04-03 RX ORDER — OMEGA-3S/DHA/EPA/FISH OIL/D3 300MG-1000
400 CAPSULE ORAL DAILY
Qty: 90 TABLET | Refills: 3 | Status: SHIPPED | OUTPATIENT
Start: 2019-04-03 | End: 2020-02-27

## 2019-04-03 RX ORDER — ONDANSETRON 4 MG/1
TABLET, ORALLY DISINTEGRATING ORAL
Qty: 10 TABLET | Refills: 5 | Status: SHIPPED | OUTPATIENT
Start: 2019-04-03 | End: 2019-09-12

## 2019-04-03 RX ORDER — SODIUM BICARBONATE 650 MG/1
TABLET ORAL
Qty: 270 TABLET | Refills: 0 | Status: SHIPPED | OUTPATIENT
Start: 2019-04-03 | End: 2019-06-26

## 2019-04-03 NOTE — TELEPHONE ENCOUNTER
Last Office Visit with Nephrologist:  3/13/18. Due for follow up prior to future reills.  Medication refilled per Nephrology Clinic protocol.     Macrina Andre RN

## 2019-04-23 ENCOUNTER — TRANSFERRED RECORDS (OUTPATIENT)
Dept: HEALTH INFORMATION MANAGEMENT | Facility: CLINIC | Age: 59
End: 2019-04-23

## 2019-04-30 ENCOUNTER — TELEPHONE (OUTPATIENT)
Dept: TRANSPLANT | Facility: CLINIC | Age: 59
End: 2019-04-30

## 2019-04-30 DIAGNOSIS — Z94.83 PANCREAS REPLACED BY TRANSPLANT (H): ICD-10-CM

## 2019-04-30 DIAGNOSIS — Z94.0 KIDNEY REPLACED BY TRANSPLANT: Primary | ICD-10-CM

## 2019-04-30 RX ORDER — SULFAMETHOXAZOLE AND TRIMETHOPRIM 400; 80 MG/1; MG/1
1 TABLET ORAL
Qty: 8 TABLET | Refills: 0 | Status: SHIPPED | OUTPATIENT
Start: 2019-05-02 | End: 2019-05-28

## 2019-04-30 NOTE — TELEPHONE ENCOUNTER
Rx for Bactrim refilled.    LPN task:  Please call and invite back for Annual [facility] Nephrology Visit.  Send message to .

## 2019-05-07 ENCOUNTER — OFFICE VISIT (OUTPATIENT)
Dept: NEPHROLOGY | Facility: CLINIC | Age: 59
End: 2019-05-07
Attending: INTERNAL MEDICINE
Payer: MEDICARE

## 2019-05-07 ENCOUNTER — MEDICAL CORRESPONDENCE (OUTPATIENT)
Dept: HEALTH INFORMATION MANAGEMENT | Facility: CLINIC | Age: 59
End: 2019-05-07

## 2019-05-07 VITALS
DIASTOLIC BLOOD PRESSURE: 87 MMHG | SYSTOLIC BLOOD PRESSURE: 160 MMHG | HEART RATE: 74 BPM | BODY MASS INDEX: 27.28 KG/M2 | WEIGHT: 197 LBS | OXYGEN SATURATION: 97 %

## 2019-05-07 DIAGNOSIS — G63 POLYNEUROPATHY ASSOCIATED WITH UNDERLYING DISEASE (H): ICD-10-CM

## 2019-05-07 DIAGNOSIS — Z94.0 KIDNEY REPLACED BY TRANSPLANT: ICD-10-CM

## 2019-05-07 DIAGNOSIS — I10 BENIGN ESSENTIAL HYPERTENSION: ICD-10-CM

## 2019-05-07 DIAGNOSIS — Z12.83 SKIN CANCER SCREENING: ICD-10-CM

## 2019-05-07 DIAGNOSIS — Z94.83 PANCREAS TRANSPLANTED (H): ICD-10-CM

## 2019-05-07 DIAGNOSIS — I15.1 HTN, KIDNEY TRANSPLANT RELATED: ICD-10-CM

## 2019-05-07 DIAGNOSIS — D84.9 IMMUNOSUPPRESSION (H): ICD-10-CM

## 2019-05-07 DIAGNOSIS — Z94.0 HTN, KIDNEY TRANSPLANT RELATED: ICD-10-CM

## 2019-05-07 DIAGNOSIS — Z94.0 S/P KIDNEY TRANSPLANT: Primary | ICD-10-CM

## 2019-05-07 DIAGNOSIS — Z48.298 AFTERCARE FOLLOWING ORGAN TRANSPLANT: Primary | ICD-10-CM

## 2019-05-07 RX ORDER — LISINOPRIL 40 MG/1
40 TABLET ORAL DAILY
Qty: 90 TABLET | Refills: 11 | Status: SHIPPED | OUTPATIENT
Start: 2019-05-07 | End: 2020-05-19

## 2019-05-07 RX ORDER — LISINOPRIL 40 MG/1
40 TABLET ORAL DAILY
Qty: 30 TABLET | Refills: 1 | Status: SHIPPED | OUTPATIENT
Start: 2019-05-07 | End: 2021-01-28

## 2019-05-07 RX ORDER — LISINOPRIL 40 MG/1
40 TABLET ORAL DAILY
Qty: 90 TABLET | Refills: 11 | Status: SHIPPED | OUTPATIENT
Start: 2019-05-07 | End: 2019-05-07

## 2019-05-07 RX ORDER — AMLODIPINE BESYLATE 5 MG/1
5 TABLET ORAL DAILY
Qty: 90 TABLET | Refills: 11 | Status: SHIPPED | OUTPATIENT
Start: 2019-05-07 | End: 2020-05-19

## 2019-05-07 RX ORDER — LISINOPRIL 10 MG/1
40 TABLET ORAL DAILY
Qty: 90 TABLET | Refills: 11 | Status: SHIPPED | OUTPATIENT
Start: 2019-05-07 | End: 2019-05-07

## 2019-05-07 NOTE — NURSING NOTE
Jose Alfredo Tomlinosn was seen today in clinic by this writer. Medications, lab orders, lab frequency, and necessary follow up discussed with patient. Patient was provided with a copy of the current lab letter. Patient voiced understanding and agreement of education and plan.     Svetlana Valdivia

## 2019-05-07 NOTE — PROGRESS NOTES
[facility]  Nephrology Clinic  Kidney/Pancreas Recipient  2019     Name: Jose Alfredo Tomlinson  MRN: [id number]   Age: 58 year old  : 1960  Referring provider: Rosio Salas     CHRONIC TRANSPLANT NEPHROLOGY VISIT    Assessment and Plan:     # SABINE:    - Baseline Cr ~ 0.8-1; Stable   - Proteinuria: 1.5g/g   - Date of DSA last checked:  Latest DSA: No   - BK Viremia: No   - Kidney Tx Biopsy: No    # Pancreas Tx (SPK):  - Enteric drained     - Blood glucose: Euglycemia   - HbA1c:    - Pancreatic enzymes: Stable   - Date of DSA last checked:  Latest DSA: No    # Immunosuppression: Tacrolimus immediate release (goal 5-7) and Mycophenolate mofetil (goal 1-3.5)   - Changes: No    # Prophylaxis:    - PJP on Bactrim.     # Hypertension: Controlled; Goal BP: < 130/80   - Changes: No    # Anemia in chronic renal disease: Hgb: Stable   - Iron studies: Replete    # Mineral Bone Disorder:    - Secondary renal hyperparathyroidism; PTH level is: Not checked recently   - Calcium; level is: Normal   - Phosphorus; level is: Normal     # Electrolytes:   - Potassium; level: Normal  - Magnesium; level: Normal    # Skin Cancer Risk:    - Discussed sun protection and recommend regular follow up with Dermatology.    # Cancer screen: completed colonscopy    # Medical Compliance: Yes    - lab compliance reinforced    Follow-up: Return in about 1 year (around 2020).     # Transplant History:  Etiology of kidney failure: diabetes mellitus type 1  Tx: DDKT on 3/28/07 and SABINE on 10/29/07  Donor Type:  Donor Class:   Significant changes in immunosuppression: None  Significant transplant-related complications: None    Transplant Office Phone Number: [phone].    Assessment and plan was discussed with the patient and they voiced understanding and agreement.    Chief Complaint   Follow-up    History of Present Illness:  Jose Alfredo Tomlinson is a 58 year old male with a history of ESKD secondary to type 1 diabetes,  status-post SABINE completed on 10/29/07, who presents for follow-up.    The patient was last evaluated on 3/13/18 by Dr. Beavers.     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    The patient is currently doing well.  He is on chronic immunosuppression therapy with tacrolimus and CellCept.  His most recent tacrolimus trough was elevated but not a true trough.  He will repeat his labs with a good trough level.    The patient's allograft function is excellent with a creatinine of 0.94 mg/dL most recently in February.  He has fasting sugars in the 80s milligrams per deciliter range.  His last hemoglobin A1c was 4.9 g%.  Inflammatory markers of the pancreas show a lipase baseline of around 100.    Today, the patient denies any chest pain or breathing difficulties.  He has no nausea or vomiting.  He denies any fever shakes or chills.  He is moving his bowels appropriately.    Recent Hospitalizations:   X? Yes Eye surgery   New Medical Issues: ? No     Decreased energy: ? No     Chest pain or SOB with exertion:  ? No     Appetite change or weight change: ? No     Nausea, vomiting or diarrhea:  ? No     Fever, sweats or chills: ? No     Leg swelling: ? No       Other medical issues:  No    Home BP: not checked      Review of Systems:   A comprehensive review of systems was obtained and negative, except as noted in the HPI or past medical history.     Active Medications:     Current Outpatient Medications:      amLODIPine (NORVASC) 5 MG tablet, Take 1 tablet (5 mg) by mouth daily, Disp: 90 tablet, Rfl: 11     ascorbic acid (VITAMIN C) 500 MG tablet, Take 2 tablets (1,000 mg) by mouth daily, Disp: 180 tablet, Rfl: 1     aspirin 81 MG tablet, Take 1 tablet (81 mg) by mouth daily, Disp: 100 tablet, Rfl: 3     CELLCEPT (BRAND)  500 MG tablet, Take 1 tablet (500 mg) by mouth 2 times daily, Disp: 60 tablet, Rfl: 11     cholecalciferol (VITAMIN D) 400 units tablet, Take 1 tablet (400 Units) by mouth daily, Disp: 90 tablet, Rfl: 3     lisinopril (PRINIVIL/ZESTRIL) 40 MG tablet, Take 1 tablet (40 mg) by mouth daily, Disp: 30 tablet, Rfl: 1     lisinopril (PRINIVIL/ZESTRIL) 40 MG tablet, Take 1 tablet (40 mg) by mouth daily, Disp: 90 tablet, Rfl: 11     omeprazole (PRILOSEC) 20 MG CR capsule, Take 1 capsule (20 mg) by mouth 2 times daily, Disp: 180 capsule, Rfl: 2     ondansetron (ZOFRAN-ODT) 4 MG ODT tab, DISSOLVE 1 TABLET ON THE TONGUE EVERY 8 HOURS AS NEEDED FOR NAUSEA, Disp: 10 tablet, Rfl: 5     order for DME, Right carbon fiber AFO replacement due to fracture at posterior strut and due to age of brace greater than 5 years. Patient requires AFOs for support and stability to enable safe ambulation, Disp: 1 Units, Rfl: 1     order for DME, Equipment being ordered: therapeutic shoes and insoles. For Peripheral neuropathy, diabetes type 1 and poor circulation, Disp: 1 Package, Rfl: 1     PROGRAF (BRAND) 0.5 MG capsule, Take 1 capsule (0.5 mg) by mouth 2 times daily (total dose 1.5 mg twice per day), Disp: 60 capsule, Rfl: 11     PROGRAF (BRAND) 1 MG capsule, Take 1 capsule (1 mg) by mouth 2 times daily (total dose 1.5 mg twice per day), Disp: 60 capsule, Rfl: 11     sodium bicarbonate 650 MG tablet, TAKE ONE TABLET BY MOUTH THREE TIMES A DAY. Needs to schedule an appointment prior to refills., Disp: 270 tablet, Rfl: 0     sulfamethoxazole-trimethoprim (BACTRIM) 400-80 MG tablet, Take 1 tablet by mouth twice a week, Disp: 8 tablet, Rfl: 0     vitamin C (ASCORBIC ACID) 500 MG tablet, TAKE TWO TABLETS BY MOUTH EVERY DAY, Disp: 180 tablet, Rfl: 3     aspirin (SM ASPIRIN ADULT LOW STRENGTH) 81 MG EC tablet, Take 1 tablet (81 mg) by mouth daily Do not take Aspirin for 7 days prior to surgery. (Patient not taking: Reported on 5/7/2019), Disp: 90  tablet, Rfl: 0     AZITHROMYCIN PO, Take 250 mg by mouth twice a week, Disp: , Rfl:      cholecalciferol (VITAMIN D) 400 units tablet, TAKE ONE TABLET BY MOUTH EVERY DAY (Patient not taking: Reported on 5/7/2019), Disp: 90 tablet, Rfl: 0     Cholecalciferol 400 UNITS CAPS, Take 1 capsule by mouth daily, Disp: 90 capsule, Rfl: 3     ganciclovir 0.15 % GEL, Use one drop in right eye 5 times each day for 7 days. (Patient not taking: Reported on 5/7/2019), Disp: 1 Tube, Rfl: 1     Multiple Vitamin (TAB-A-AMBER) TABS, Take 1 tablet by mouth daily (Patient not taking: Reported on 5/7/2019), Disp: 90 tablet, Rfl: 3     multivitamin, therapeutic with minerals (MULTI-VITAMIN) TABS tablet, Take 1 tablet by mouth daily (Patient not taking: Reported on 5/7/2019), Disp: 100 each, Rfl: 3     omeprazole (PRILOSEC) 20 MG DR capsule, Take 1 capsule (20 mg) by mouth 2 times daily (Patient not taking: Reported on 5/7/2019), Disp: 180 capsule, Rfl: 3      Allergies:   No known drug allergies.       Active Medical Problems:  Pancreas transplant  Status post kidney transplant  Type 1 diabetes   Hyperlipidemia   Peripheral artery disease  Seborrheic keratosis   Acquired perforating dermatosis   Atrial bigeminy  Benign essential hypertension   Diabetic neuropathy   Tactile anesthesia  Diabetic foot ulcer  Wrist drop, bilateral      Social History:   Tobacco Use: No previous or current tobacco use.   Alcohol Use: No alcohol use.   PCP: Rosio Salas     Physical Exam:   /87   Pulse 74   Wt 89.4 kg (197 lb)   SpO2 97%   BMI 27.28 kg/m     Wt Readings from Last 4 Encounters:   05/07/19 89.4 kg (197 lb)   03/22/19 86.5 kg (190 lb 9.6 oz)   03/15/19 88.5 kg (195 lb)   03/01/19 86.2 kg (190 lb)   /87   Pulse 74   Wt 89.4 kg (197 lb)   SpO2 97%   BMI 27.28 kg/m      GENERAL APPEARANCE: alert and no distress  HENT: mouth without ulcers or lesions  LYMPHATICS: no cervical or supraclavicular nodes  RESP: lungs clear to  auscultation - no rales, rhonchi or wheezes  CV: regular rhythm, normal rate, no rub, no murmur  EDEMA: no LE edema bilaterally  ABDOMEN: soft, nondistended, nontender, bowel sounds normal  MS: extremities normal - no gross deformities noted, no evidence of inflammation in joints, no muscle tenderness  SKIN: no rash  TX KIDNEY: normal  DIALYSIS ACCESS:  none    Data:   Renal Latest Ref Rng & Units 2/1/2019 12/14/2018 4/5/2018   Na 133 - 144 mmol/L 142 140 142   K 3.4 - 5.3 mmol/L 4.3 4.6 4.4   Cl 94 - 109 mmol/L 113(H) 110(H) 114(H)   CO2 20 - 32 mmol/L 20 20 20   BUN 7 - 30 mg/dL 28 30 38(H)   Cr 0.66 - 1.25 mg/dL 0.94 0.83 0.96   Glucose 70 - 99 mg/dL 86 94 80   Ca  8.5 - 10.1 mg/dL 8.8 9.9 8.7   Mg 1.6 - 2.3 mg/dL - - -     Bone Health Latest Ref Rng & Units 11/14/2010 11/13/2010 11/12/2010   Phos 2.5 - 4.5 mg/dL 4.1 3.7 3.9   PTHi 12 - 72 pg/mL - - -     Heme Latest Ref Rng & Units 2/1/2019 12/14/2018 4/5/2018   WBC 4.0 - 11.0 10e9/L 6.3 7.4 6.5   Hgb 13.3 - 17.7 g/dL 13.8 14.1 13.3   Plt 150 - 450 10e9/L 168 181 139(L)     Liver Latest Ref Rng & Units 6/26/2016 1/25/2016 8/12/2012   AP 40 - 150 U/L 98 97 79   TBili 0.2 - 1.3 mg/dL 0.4 0.8 0.9   ALT 0 - 70 U/L 26 21 32   AST 0 - 45 U/L 19 14 41   Tot Protein 6.8 - 8.8 g/dL 7.0 6.8 7.2   Albumin 3.4 - 5.0 g/dL 4.0 3.8 4.4     Pancreas Latest Ref Rng & Units 2/1/20191/2019 12/14/2018 4/5/2018   A1C 0 - 5.6 % 4.9 - -   Amylase 30 - 110 U/L 60 70 67   Lipase 73 - 393 U/L 123 221 263     Iron studies Latest Ref Rng & Units 1/25/2016 11/16/2007 3/29/2007   Iron 35 - 180 ug/dL 85 38 22(L)   Iron sat 15 - 46 % 27 - -   Ferritin 26 - 388 ng/mL 53 368(H) 726(H)     [facility] Txp Virology Latest Ref Rng & Units 3/3/2015 11/12/2010 4/12/2010   CMV IgG EU/mL - - -   CVM DNA Quant - - Whole blood, EDTA anticoagulant -   CMV Quant <100 Copies/mL - <100 -   CMV QT Log <2.0 Log copies/mL - <2.0 -   BK Spec - Plasma - Plasma, EDTA anticoagulant   BK Res BKNEG copies/mL BK Virus DNA Not  Detected - <1000   BK Log <2.7 Log copies/mL Not Calculated   The Real-Time quantitative BK Virus assay was developed and its performance   characteristics determined by the Infectious Diseases Diagnostic Laboratory at   the [facility] in [place], [place]. The   primers and probes for each analyte are Analyte Specific Reagents (ASRs)   manufactured by [employer].   ASRs are used in many laboratory tests necessary for standard medical care and   generally do not require U.S. Food and Drug Administration approval. The FDA   has determined that such clearance or approval is not necessary.   This test is used for clinical purposes. It should not be regarded as   investigational or for research. This laboratory is certified under the   Clinical Laboratory Improvement Amendments of 1988 (CLIA-88) as qualified to   perform high complexity clinical laboratory testing.   - <3.0   EBV IgG - - - -   Hep B Core NEG - - -   Hep B Surf 0.0 - 4.9 mIU/mL - - -   HIV 1&2 NEG - - -        Recent Labs   Lab Test 03/03/15  1107 02/23/18  1036 02/01/19  0959   DOSTAC 7p 03/02 1900 2/22/18 2/1/19 1900   TACROL 7.8 7.5 11.9     Recent Labs   Lab Test 12/17/12  0750 01/28/13  0600 08/26/13  0735   DOSMPA Not Provided Not Provided 08/25/13, 1900   MPACID [id number] 1.21 1.05   MPAG 23.4* 30.7 29.4*     Attestation:  This patient has been seen and evaluated by me, Sourav Beavers MD.  I have reviewed the note and agree with plan of care as documented by the fellow.

## 2019-05-07 NOTE — LETTER
2019       RE: Jose Alfredo Tomlinson  [address] [place] [address] [address]  [place] [address]     Dear Colleague,    Thank you for referring your patient, Jose Alfredo Tomlinson, to the [facility] NEPHROLOGY at [facility]. Please see a copy of my visit note below.    [facility]  Nephrology Clinic  Kidney/Pancreas Recipient  2019     Name: Jose Alfredo Tomlinson  MRN: [id number]   Age: 58 year old  : 1960  Referring provider: Rosio Salas     CHRONIC TRANSPLANT NEPHROLOGY VISIT    Assessment and Plan:     # SABINE:    - Baseline Cr ~ 0.8-1; Stable   - Proteinuria: 1.5g/g   - Date of DSA last checked:  Latest DSA: No   - BK Viremia: No   - Kidney Tx Biopsy: No    # Pancreas Tx (SPK):  - Enteric drained     - Blood glucose: Euglycemia   - HbA1c:    - Pancreatic enzymes: Stable   - Date of DSA last checked:  Latest DSA: No    # Immunosuppression: Tacrolimus immediate release (goal 5-7) and Mycophenolate mofetil (goal 1-3.5)   - Changes: No    # Prophylaxis:    - PJP on Bactrim.     # Hypertension: Controlled; Goal BP: < 130/80   - Changes: No    # Anemia in chronic renal disease: Hgb: Stable   - Iron studies: Replete    # Mineral Bone Disorder:    - Secondary renal hyperparathyroidism; PTH level is: Not checked recently   - Calcium; level is: Normal   - Phosphorus; level is: Normal     # Electrolytes:   - Potassium; level: Normal  - Magnesium; level: Normal    # Skin Cancer Risk:    - Discussed sun protection and recommend regular follow up with Dermatology.    # Cancer screen: completed colonscopy    # Medical Compliance: Yes    - lab compliance reinforced    Follow-up: Return in about 1 year (around 2020).     # Transplant History:  Etiology of kidney failure: diabetes mellitus type 1  Tx: DDKT on 3/28/07 and SABINE on 10/29/07  Donor Type:  Donor Class:   Significant changes in immunosuppression: None  Significant transplant-related complications:  None    Transplant Office Phone Number: [phone].    Assessment and plan was discussed with the patient and they voiced understanding and agreement.    Chief Complaint   Follow-up    History of Present Illness:  Jose Alfredo Tomlinson is a 58 year old male with a history of ESKD secondary to type 1 diabetes, status-post SABINE completed on 10/29/07, who presents for follow-up.    The patient was last evaluated on 3/13/18 by Dr. Beavers.     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    The patient is currently doing well.  He is on chronic immunosuppression therapy with tacrolimus and CellCept.  His most recent tacrolimus trough was elevated but not a true trough.  He will repeat his labs with a good trough level.    The patient's allograft function is excellent with a creatinine of 0.94 mg/dL most recently in February.  He has fasting sugars in the 80s milligrams per deciliter range.  His last hemoglobin A1c was 4.9 g%.  Inflammatory markers of the pancreas show a lipase baseline of around 100.    Today, the patient denies any chest pain or breathing difficulties.  He has no nausea or vomiting.  He denies any fever shakes or chills.  He is moving his bowels appropriately.    Recent Hospitalizations:   X? Yes Eye surgery   New Medical Issues: ? No     Decreased energy: ? No     Chest pain or SOB with exertion:  ? No     Appetite change or weight change: ? No     Nausea, vomiting or diarrhea:  ? No     Fever, sweats or chills: ? No     Leg swelling: ? No       Other medical issues:  No    Home BP: not checked      Review of Systems:   A comprehensive review of systems was obtained and negative, except as noted in the HPI or past medical history.     Active Medications:     Current Outpatient Medications:       amLODIPine (NORVASC) 5 MG tablet, Take 1 tablet (5 mg) by mouth daily, Disp: 90 tablet, Rfl: 11     ascorbic acid (VITAMIN C) 500 MG tablet, Take 2 tablets (1,000 mg) by mouth daily, Disp: 180 tablet, Rfl: 1     aspirin 81 MG tablet, Take 1 tablet (81 mg) by mouth daily, Disp: 100 tablet, Rfl: 3     CELLCEPT (BRAND) 500 MG tablet, Take 1 tablet (500 mg) by mouth 2 times daily, Disp: 60 tablet, Rfl: 11     cholecalciferol (VITAMIN D) 400 units tablet, Take 1 tablet (400 Units) by mouth daily, Disp: 90 tablet, Rfl: 3     lisinopril (PRINIVIL/ZESTRIL) 40 MG tablet, Take 1 tablet (40 mg) by mouth daily, Disp: 30 tablet, Rfl: 1     lisinopril (PRINIVIL/ZESTRIL) 40 MG tablet, Take 1 tablet (40 mg) by mouth daily, Disp: 90 tablet, Rfl: 11     omeprazole (PRILOSEC) 20 MG CR capsule, Take 1 capsule (20 mg) by mouth 2 times daily, Disp: 180 capsule, Rfl: 2     ondansetron (ZOFRAN-ODT) 4 MG ODT tab, DISSOLVE 1 TABLET ON THE TONGUE EVERY 8 HOURS AS NEEDED FOR NAUSEA, Disp: 10 tablet, Rfl: 5     order for DME, Right carbon fiber AFO replacement due to fracture at posterior strut and due to age of brace greater than 5 years. Patient requires AFOs for support and stability to enable safe ambulation, Disp: 1 Units, Rfl: 1     order for DME, Equipment being ordered: therapeutic shoes and insoles. For Peripheral neuropathy, diabetes type 1 and poor circulation, Disp: 1 Package, Rfl: 1     PROGRAF (BRAND) 0.5 MG capsule, Take 1 capsule (0.5 mg) by mouth 2 times daily (total dose 1.5 mg twice per day), Disp: 60 capsule, Rfl: 11     PROGRAF (BRAND) 1 MG capsule, Take 1 capsule (1 mg) by mouth 2 times daily (total dose 1.5 mg twice per day), Disp: 60 capsule, Rfl: 11     sodium bicarbonate 650 MG tablet, TAKE ONE TABLET BY MOUTH THREE TIMES A DAY. Needs to schedule an appointment prior to refills., Disp: 270 tablet, Rfl: 0     sulfamethoxazole-trimethoprim (BACTRIM) 400-80 MG tablet, Take 1 tablet by mouth twice a week, Disp: 8 tablet,  Rfl: 0     vitamin C (ASCORBIC ACID) 500 MG tablet, TAKE TWO TABLETS BY MOUTH EVERY DAY, Disp: 180 tablet, Rfl: 3     aspirin (SM ASPIRIN ADULT LOW STRENGTH) 81 MG EC tablet, Take 1 tablet (81 mg) by mouth daily Do not take Aspirin for 7 days prior to surgery. (Patient not taking: Reported on 5/7/2019), Disp: 90 tablet, Rfl: 0     AZITHROMYCIN PO, Take 250 mg by mouth twice a week, Disp: , Rfl:      cholecalciferol (VITAMIN D) 400 units tablet, TAKE ONE TABLET BY MOUTH EVERY DAY (Patient not taking: Reported on 5/7/2019), Disp: 90 tablet, Rfl: 0     Cholecalciferol 400 UNITS CAPS, Take 1 capsule by mouth daily, Disp: 90 capsule, Rfl: 3     ganciclovir 0.15 % GEL, Use one drop in right eye 5 times each day for 7 days. (Patient not taking: Reported on 5/7/2019), Disp: 1 Tube, Rfl: 1     Multiple Vitamin (TAB-A-AMBER) TABS, Take 1 tablet by mouth daily (Patient not taking: Reported on 5/7/2019), Disp: 90 tablet, Rfl: 3     multivitamin, therapeutic with minerals (MULTI-VITAMIN) TABS tablet, Take 1 tablet by mouth daily (Patient not taking: Reported on 5/7/2019), Disp: 100 each, Rfl: 3     omeprazole (PRILOSEC) 20 MG DR capsule, Take 1 capsule (20 mg) by mouth 2 times daily (Patient not taking: Reported on 5/7/2019), Disp: 180 capsule, Rfl: 3      Allergies:   No known drug allergies.       Active Medical Problems:  Pancreas transplant  Status post kidney transplant  Type 1 diabetes   Hyperlipidemia   Peripheral artery disease  Seborrheic keratosis   Acquired perforating dermatosis   Atrial bigeminy  Benign essential hypertension   Diabetic neuropathy   Tactile anesthesia  Diabetic foot ulcer  Wrist drop, bilateral      Social History:   Tobacco Use: No previous or current tobacco use.   Alcohol Use: No alcohol use.   PCP: Rosio Salas     Physical Exam:   /87   Pulse 74   Wt 89.4 kg (197 lb)   SpO2 97%   BMI 27.28 kg/m      Wt Readings from Last 4 Encounters:   05/07/19 89.4 kg (197 lb)   03/22/19 86.5  kg (190 lb 9.6 oz)   03/15/19 88.5 kg (195 lb)   03/01/19 86.2 kg (190 lb)   /87   Pulse 74   Wt 89.4 kg (197 lb)   SpO2 97%   BMI 27.28 kg/m       GENERAL APPEARANCE: alert and no distress  HENT: mouth without ulcers or lesions  LYMPHATICS: no cervical or supraclavicular nodes  RESP: lungs clear to auscultation - no rales, rhonchi or wheezes  CV: regular rhythm, normal rate, no rub, no murmur  EDEMA: no LE edema bilaterally  ABDOMEN: soft, nondistended, nontender, bowel sounds normal  MS: extremities normal - no gross deformities noted, no evidence of inflammation in joints, no muscle tenderness  SKIN: no rash  TX KIDNEY: normal  DIALYSIS ACCESS:  none    Data:   Renal Latest Ref Rng & Units 2/1/2019 12/14/2018 4/5/2018   Na 133 - 144 mmol/L 142 140 142   K 3.4 - 5.3 mmol/L 4.3 4.6 4.4   Cl 94 - 109 mmol/L 113(H) 110(H) 114(H)   CO2 20 - 32 mmol/L 20 20 20   BUN 7 - 30 mg/dL 28 30 38(H)   Cr 0.66 - 1.25 mg/dL 0.94 0.83 0.96   Glucose 70 - 99 mg/dL 86 94 80   Ca  8.5 - 10.1 mg/dL 8.8 9.9 8.7   Mg 1.6 - 2.3 mg/dL - - -     Bone Health Latest Ref Rng & Units 11/14/2010 11/13/2010 11/12/2010   Phos 2.5 - 4.5 mg/dL 4.1 3.7 3.9   PTHi 12 - 72 pg/mL - - -     Heme Latest Ref Rng & Units 2/1/2019 12/14/2018 4/5/2018   WBC 4.0 - 11.0 10e9/L 6.3 7.4 6.5   Hgb 13.3 - 17.7 g/dL 13.8 14.1 13.3   Plt 150 - 450 10e9/L 168 181 139(L)     Liver Latest Ref Rng & Units 6/26/2016 1/25/2016 8/12/2012   AP 40 - 150 U/L 98 97 79   TBili 0.2 - 1.3 mg/dL 0.4 0.8 0.9   ALT 0 - 70 U/L 26 21 32   AST 0 - 45 U/L 19 14 41   Tot Protein 6.8 - 8.8 g/dL 7.0 6.8 7.2   Albumin 3.4 - 5.0 g/dL 4.0 3.8 4.4     Pancreas Latest Ref Rng & Units 2/1/2019 12/14/2018 4/5/2018   A1C 0 - 5.6 % 4.9 - -   Amylase 30 - 110 U/L 60 70 67   Lipase 73 - 393 U/L 123 221 263     Iron studies Latest Ref Rng & Units 1/25/2016 11/16/2007 3/29/2007   Iron 35 - 180 ug/dL 85 38 22(L)   Iron sat 15 - 46 % 27 - -   Ferritin 26 - 388 ng/mL 53 368(H) 726(H)     [facility]  Virology Latest Ref Rng & Units 3/3/2015 11/12/2010 4/12/2010   CMV IgG EU/mL - - -   CVM DNA Quant - - Whole blood, EDTA anticoagulant -   CMV Quant <100 Copies/mL - <100 -   CMV QT Log <2.0 Log copies/mL - <2.0 -   BK Spec - Plasma - Plasma, EDTA anticoagulant   BK Res BKNEG copies/mL BK Virus DNA Not Detected - <1000   BK Log <2.7 Log copies/mL Not Calculated   The Real-Time quantitative BK Virus assay was developed and its performance   characteristics determined by the Infectious Diseases Diagnostic Laboratory at   the [facility] in [place], [place]. The   primers and probes for each analyte are Analyte Specific Reagents (ASRs)   manufactured by [employer].   ASRs are used in many laboratory tests necessary for standard medical care and   generally do not require U.S. Food and Drug Administration approval. The FDA   has determined that such clearance or approval is not necessary.   This test is used for clinical purposes. It should not be regarded as   investigational or for research. This laboratory is certified under the   Clinical Laboratory Improvement Amendments of 1988 (CLIA-88) as qualified to   perform high complexity clinical laboratory testing.   - <3.0   EBV IgG - - - -   Hep B Core NEG - - -   Hep B Surf 0.0 - 4.9 mIU/mL - - -   HIV 1&2 NEG - - -        Recent Labs   Lab Test 03/03/15  1107 02/23/18  1036 02/01/19  0959   DOSTAC 7p 03/02 1900 2/22/18 2/1/19 1900   TACROL 7.8 7.5 11.9     Recent Labs   Lab Test 12/17/12  0750 01/28/13  0600 08/26/13  0735   DOSMPA Not Provided Not Provided 08/25/13, 1900   MPACID 1.69 1.21 1.05   MPAG 23.4* 30.7 29.4*     Attestation:  This patient has been seen and evaluated by me, Sourav Beavers MD.  I have reviewed the note and agree with plan of care as documented by the fellow.           Again, thank you for allowing me to participate in the care of your patient.      Sincerely,    Kidney/Pancreas Recipient

## 2019-05-07 NOTE — NURSING NOTE
Monthly lab compliance reinforced - nephrology RN to check in with him in two weeks re BP control - message sent.   Labs ordered through [facility]

## 2019-05-21 ENCOUNTER — CARE COORDINATION (OUTPATIENT)
Dept: NEPHROLOGY | Facility: CLINIC | Age: 59
End: 2019-05-21

## 2019-05-28 DIAGNOSIS — Z94.0 KIDNEY REPLACED BY TRANSPLANT: ICD-10-CM

## 2019-05-28 DIAGNOSIS — Z94.83 PANCREAS REPLACED BY TRANSPLANT (H): ICD-10-CM

## 2019-05-28 DIAGNOSIS — Z94.0 S/P KIDNEY TRANSPLANT: Primary | ICD-10-CM

## 2019-05-28 RX ORDER — SULFAMETHOXAZOLE AND TRIMETHOPRIM 400; 80 MG/1; MG/1
1 TABLET ORAL
Qty: 8 TABLET | Refills: 11 | Status: SHIPPED | OUTPATIENT
Start: 2019-05-30 | End: 2020-04-21

## 2019-05-29 NOTE — TELEPHONE ENCOUNTER
Last Clinic Visit: 2/28/2019  [facility]

## 2019-06-06 ENCOUNTER — CARE COORDINATION (OUTPATIENT)
Dept: NEPHROLOGY | Facility: CLINIC | Age: 59
End: 2019-06-06

## 2019-06-06 NOTE — PROGRESS NOTES
Called patient. He said his BP has been okay, then line disconnected. Left message for him to call back.    Macrina Andre RN

## 2019-06-10 NOTE — PROGRESS NOTES
Have made multiple attempts to follow up with patient on BP. He has not answered nor returned my calls. Will back off on follow up and monitor chart. An update was sent to his coordinator.    Macrina Andre RN

## 2019-06-25 ENCOUNTER — OFFICE VISIT (OUTPATIENT)
Dept: DERMATOLOGY | Facility: CLINIC | Age: 59
End: 2019-06-25
Payer: MEDICARE

## 2019-06-25 DIAGNOSIS — L82.1 SK (SEBORRHEIC KERATOSIS): ICD-10-CM

## 2019-06-25 DIAGNOSIS — Z94.0 KIDNEY REPLACED BY TRANSPLANT: ICD-10-CM

## 2019-06-25 DIAGNOSIS — D48.5 NEOPLASM OF UNCERTAIN BEHAVIOR OF SKIN: Primary | ICD-10-CM

## 2019-06-25 DIAGNOSIS — Z94.83 PANCREAS REPLACED BY TRANSPLANT (H): ICD-10-CM

## 2019-06-25 DIAGNOSIS — L81.8 STASIS PIGMENTATION: ICD-10-CM

## 2019-06-25 PROCEDURE — 88312 SPECIAL STAINS GROUP 1: CPT | Mod: TC | Performed by: DERMATOLOGY

## 2019-06-25 PROCEDURE — 88305 TISSUE EXAM BY PATHOLOGIST: CPT | Mod: TC | Performed by: DERMATOLOGY

## 2019-06-25 RX ORDER — LIDOCAINE HYDROCHLORIDE AND EPINEPHRINE 10; 10 MG/ML; UG/ML
3 INJECTION, SOLUTION INFILTRATION; PERINEURAL ONCE
Status: ACTIVE | OUTPATIENT
Start: 2019-06-25

## 2019-06-25 ASSESSMENT — PAIN SCALES - GENERAL
PAINLEVEL: NO PAIN (0)
PAINLEVEL: NO PAIN (0)

## 2019-06-25 NOTE — LETTER
6/25/2019       RE: Jose Alfredo Tomlinson  [address]  [place] [address]     Dear Colleague,    Thank you for referring your patient, Jose Alfredo Tomlinson, to the [facility] DERMATOLOGY at [facility]. Please see a copy of my visit note below.    [facility] Dermatology Note      Dermatology Problem List:  1. NUB on the L medial calf s/p shave biopsy 6/25/19  2. 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 No concerns.          History of Present Illness:  Mr. Jose Alfredo Tomlinson is a 59 year old male with a history of pancreas and kidney transplant in 2007, immunosuppressed on cellcept and prograf who presents for skin check. No personal history of skin cancer. There is a family history of skin cancer in his uncle and grandfather, patient is not sure what kind. He does not get frequent sun exposure but uses sunscreen and wears hat when he is outside. No new or changing moles. No areas of skin irritation, bleeding, or other concerns. He does not wear compression stockings. He does not currently have lower extremity edema but states this occurs occasionally, is unsure if it is better or worse with walking. He denies nonhealing wounds.         Past Medical History:   Patient Active Problem List   Diagnosis     Pancreas transplanted (H)     S/P kidney transplant     History of diabetes mellitus, type I     Hyperlipidemia     Peripheral neuropathy     PAD (peripheral artery disease) (H)     Skin exam, screening for cancer     Seborrheic keratosis     Acquired perforating dermatosis     Cellulitis     Atrial bigeminy     Kidney replaced by transplant     Pancreas replaced by transplant (H)     Benign essential hypertension     Diabetic neuropathy, type I diabetes mellitus (H)     Right  foot pain     Left foot pain     Tactile anesthesia     Personal history of diabetic foot ulcer     Right foot drop     Wrist drop, bilateral     Swelling of limb     Immunosuppression (H)     Past Medical History:   Diagnosis Date     Cataracts      Gastroparesis      GERD (gastroesophageal reflux disease)      History of diabetes mellitus, type I      Hyperlipidemia      Hypertension      Pancreas transplanted (H) 2007    hx of rejection in past      Peripheral neuropathy      S/P kidney transplant 2007     Past Surgical History:   Procedure Laterality Date     CATARACT IOL, RT/LT Bilateral      COLONOSCOPY  12/31/2013    Procedure: COMBINED COLONOSCOPY, SINGLE BIOPSY/POLYPECTOMY BY BIOPSY;  COLONOSCOPY;  Surgeon: Isac Colby MD;  Location:  GI     ESOPHAGOSCOPY, GASTROSCOPY, DUODENOSCOPY (EGD), COMBINED  1/20/2012    Procedure:COMBINED ESOPHAGOSCOPY, GASTROSCOPY, DUODENOSCOPY (EGD); Surgeon:GAB MARTIN; Location:[facility]     REPAIR PTOSIS Left 3/15/2019    Procedure: LEFT UPPER EYELID PTOSIS REPAIR;  Surgeon: Rj Jose MD;  Location:  OR     TRANSPLANT  2007    K/P       Social History:  Patient reports that he has never smoked. He has never used smokeless tobacco. He reports that he does not drink alcohol or use drugs.    Family History:  Family History   Problem Relation Age of Onset     Diabetes Son      Arthritis Mother      Diabetes Father      Diabetes Paternal Grandfather      Cancer Paternal Grandfather         Skin cancer     Cancer Maternal Grandfather         Skin cancer     Glaucoma No family hx of      Macular Degeneration No family hx of        Medications:  Current Outpatient Medications   Medication Sig Dispense Refill     amLODIPine (NORVASC) 5 MG tablet Take 1 tablet (5 mg) by mouth daily 90 tablet 11     ascorbic acid (VITAMIN C) 500 MG tablet Take 2 tablets (1,000 mg) by mouth daily 180 tablet 1     aspirin (SM ASPIRIN ADULT LOW STRENGTH) 81 MG EC tablet Take 1  tablet (81 mg) by mouth daily 90 tablet 2     aspirin 81 MG tablet Take 1 tablet (81 mg) by mouth daily 100 tablet 3     AZITHROMYCIN PO Take 250 mg by mouth twice a week       CELLCEPT (BRAND) 500 MG tablet Take 1 tablet (500 mg) by mouth 2 times daily 60 tablet 11     cholecalciferol (VITAMIN D) 400 units tablet Take 1 tablet (400 Units) by mouth daily 90 tablet 3     Cholecalciferol 400 UNITS CAPS Take 1 capsule by mouth daily 90 capsule 3     lisinopril (PRINIVIL/ZESTRIL) 40 MG tablet Take 1 tablet (40 mg) by mouth daily 30 tablet 1     lisinopril (PRINIVIL/ZESTRIL) 40 MG tablet Take 1 tablet (40 mg) by mouth daily 90 tablet 11     omeprazole (PRILOSEC) 20 MG CR capsule Take 1 capsule (20 mg) by mouth 2 times daily 180 capsule 2     omeprazole (PRILOSEC) 20 MG DR capsule Take 1 capsule (20 mg) by mouth 2 times daily 180 capsule 3     ondansetron (ZOFRAN-ODT) 4 MG ODT tab DISSOLVE 1 TABLET ON THE TONGUE EVERY 8 HOURS AS NEEDED FOR NAUSEA 10 tablet 5     order for DME Right carbon fiber AFO replacement due to fracture at posterior strut and due to age of brace greater than 5 years.  Patient requires AFOs for support and stability to enable safe ambulation 1 Units 1     order for DME Equipment being ordered: therapeutic shoes and insoles. For Peripheral neuropathy, diabetes type 1 and poor circulation 1 Package 1     PROGRAF (BRAND) 0.5 MG capsule Take 1 capsule (0.5 mg) by mouth 2 times daily (total dose 1.5 mg twice per day) 60 capsule 11     PROGRAF (BRAND) 1 MG capsule Take 1 capsule (1 mg) by mouth 2 times daily (total dose 1.5 mg twice per day) 60 capsule 11     sodium bicarbonate 650 MG tablet TAKE ONE TABLET BY MOUTH THREE TIMES A DAY. Needs to schedule an appointment prior to refills. 270 tablet 0     sulfamethoxazole-trimethoprim (BACTRIM) 400-80 MG tablet Take 1 tablet by mouth twice a week 8 tablet 11     vitamin C (ASCORBIC ACID) 500 MG tablet TAKE TWO TABLETS BY MOUTH EVERY  tablet 3      cholecalciferol (VITAMIN D) 400 units tablet TAKE ONE TABLET BY MOUTH EVERY DAY (Patient not taking: Reported on 5/7/2019) 90 tablet 0     ganciclovir 0.15 % GEL Use one drop in right eye 5 times each day for 7 days. (Patient not taking: Reported on 5/7/2019) 1 Tube 1     Multiple Vitamin (TAB-A-AMBER) TABS Take 1 tablet by mouth daily (Patient not taking: Reported on 5/7/2019) 90 tablet 3     multivitamin, therapeutic with minerals (MULTI-VITAMIN) TABS tablet Take 1 tablet by mouth daily (Patient not taking: Reported on 5/7/2019) 100 each 3     No Known Allergies      Review of Systems:  -As per HPI  -Constitutional: Otherwise feeling well today, in usual state of health.  -Skin: As above in HPI. No additional skin concerns.    Physical exam:  Vitals: There were no vitals taken for this visit.  GEN: This is a well developed, well-nourished male in no acute distress, in a pleasant mood.    SKIN: Total skin excluding the undergarment areas was performed. The exam included the head/face, neck, both arms, chest, back, abdomen, both legs, digits and/or nails.   -1.2 x 1.1 cm pink papule on the left medial calf.  -Hyperpigmentation on the lower extremities with stasis changes.    -Few waxy brown stuck on papules on the trunk.  -No other lesions of concern on areas examined.     Impression/Plan:  1. Neoplasm of uncertain behavior on the L medial calf. The differential diagnosis includes ruptured folliculitis vs NMSC .     Shave biopsy(performed by faculty): After discussion of benefits and risks including but not limited to bleeding, infection, scar, incomplete removal, recurrence, and non-diagnostic biopsy, written consent and photographs were obtained. Time-out was performed. The area was cleaned with isopropyl alcohol. 1 mL of 1% lidocaine with 1:100,000 epinephrine was injected to obtain adequate anesthesia of the lesion on the L medial calf. A shave biopsy was performed. Hemostasis was achieved with aluminium  chloride. Vaseline and a sterile dressing were applied. The patient tolerated the procedure and no complications were noted. The patient was provided with verbal and written post care instructions.     Patient will be contacted with biopsy results.     2. 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 Seborrheic keratosis, non irritated     Benign nature was discussed.No further intervention required at this time.        Rosio Salas MD  [address] [place] [address] on close of this encounter.  Follow-up in 1 year, earlier for new or changing lesions.     Staff Involved:  I, Sonal Layne, MS3, saw and examined the patient and acted as a scribe in the presence of Dr. Carvalho.    Staff Physician:  I was present with the medical student who participated in the service and in the documentation of the note. I have verified the history and personally performed the physical exam and medical decision making. I agree with the assessment and plan of care as documented in the note.     Clayton Carvalho MD  Staff Dermatologist and Dermatopathologist  , Department of Dermatology

## 2019-06-25 NOTE — PROGRESS NOTES
[facility] Dermatology Note      Dermatology Problem List:  1. NUB on the L medial calf s/p shave biopsy 6/25/19  2. 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 No concerns.          History of Present Illness:  Mr. Jose Alfredo Tomlinson is a 59 year old male with a history of pancreas and kidney transplant in 2007, immunosuppressed on cellcept and prograf who presents for skin check. No personal history of skin cancer. There is a family history of skin cancer in his uncle and grandfather, patient is not sure what kind. He does not get frequent sun exposure but uses sunscreen and wears hat when he is outside. No new or changing moles. No areas of skin irritation, bleeding, or other concerns. He does not wear compression stockings. He does not currently have lower extremity edema but states this occurs occasionally, is unsure if it is better or worse with walking. He denies nonhealing wounds.         Past Medical History:   Patient Active Problem List   Diagnosis     Pancreas transplanted (H)     S/P kidney transplant     History of diabetes mellitus, type I     Hyperlipidemia     Peripheral neuropathy     PAD (peripheral artery disease) (H)     Skin exam, screening for cancer     Seborrheic keratosis     Acquired perforating dermatosis     Cellulitis     Atrial bigeminy     Kidney replaced by transplant     Pancreas replaced by transplant (H)     Benign essential hypertension     Diabetic neuropathy, type I diabetes mellitus (H)     Right foot pain     Left foot pain     Tactile anesthesia     Personal history of diabetic foot ulcer     Right foot drop     Wrist drop, bilateral     Swelling of limb     Immunosuppression (H)     Past Medical History:   Diagnosis Date     Cataracts      Gastroparesis      GERD (gastroesophageal  reflux disease)      History of diabetes mellitus, type I      Hyperlipidemia      Hypertension      Pancreas transplanted (H) 2007    hx of rejection in past      Peripheral neuropathy      S/P kidney transplant 2007     Past Surgical History:   Procedure Laterality Date     CATARACT IOL, RT/LT Bilateral      COLONOSCOPY  12/31/2013    Procedure: COMBINED COLONOSCOPY, SINGLE BIOPSY/POLYPECTOMY BY BIOPSY;  COLONOSCOPY;  Surgeon: Isac Colby MD;  Location:  GI     ESOPHAGOSCOPY, GASTROSCOPY, DUODENOSCOPY (EGD), COMBINED  1/20/2012    Procedure:COMBINED ESOPHAGOSCOPY, GASTROSCOPY, DUODENOSCOPY (EGD); Surgeon:GAB MARTIN; Location:[facility]     REPAIR PTOSIS Left 3/15/2019    Procedure: LEFT UPPER EYELID PTOSIS REPAIR;  Surgeon: Rj Jose MD;  Location:  OR     TRANSPLANT  2007    K/P       Social History:  Patient reports that he has never smoked. He has never used smokeless tobacco. He reports that he does not drink alcohol or use drugs.    Family History:  Family History   Problem Relation Age of Onset     Diabetes Son      Arthritis Mother      Diabetes Father      Diabetes Paternal Grandfather      Cancer Paternal Grandfather         Skin cancer     Cancer Maternal Grandfather         Skin cancer     Glaucoma No family hx of      Macular Degeneration No family hx of        Medications:  Current Outpatient Medications   Medication Sig Dispense Refill     amLODIPine (NORVASC) 5 MG tablet Take 1 tablet (5 mg) by mouth daily 90 tablet 11     ascorbic acid (VITAMIN C) 500 MG tablet Take 2 tablets (1,000 mg) by mouth daily 180 tablet 1     aspirin (SM ASPIRIN ADULT LOW STRENGTH) 81 MG EC tablet Take 1 tablet (81 mg) by mouth daily 90 tablet 2     aspirin 81 MG tablet Take 1 tablet (81 mg) by mouth daily 100 tablet 3     AZITHROMYCIN PO Take 250 mg by mouth twice a week       CELLCEPT (BRAND) 500 MG tablet Take 1 tablet (500 mg) by mouth 2 times daily 60 tablet 11     cholecalciferol  (VITAMIN D) 400 units tablet Take 1 tablet (400 Units) by mouth daily 90 tablet 3     Cholecalciferol 400 UNITS CAPS Take 1 capsule by mouth daily 90 capsule 3     lisinopril (PRINIVIL/ZESTRIL) 40 MG tablet Take 1 tablet (40 mg) by mouth daily 30 tablet 1     lisinopril (PRINIVIL/ZESTRIL) 40 MG tablet Take 1 tablet (40 mg) by mouth daily 90 tablet 11     omeprazole (PRILOSEC) 20 MG CR capsule Take 1 capsule (20 mg) by mouth 2 times daily 180 capsule 2     omeprazole (PRILOSEC) 20 MG DR capsule Take 1 capsule (20 mg) by mouth 2 times daily 180 capsule 3     ondansetron (ZOFRAN-ODT) 4 MG ODT tab DISSOLVE 1 TABLET ON THE TONGUE EVERY 8 HOURS AS NEEDED FOR NAUSEA 10 tablet 5     order for DME Right carbon fiber AFO replacement due to fracture at posterior strut and due to age of brace greater than 5 years.  Patient requires AFOs for support and stability to enable safe ambulation 1 Units 1     order for DME Equipment being ordered: therapeutic shoes and insoles. For Peripheral neuropathy, diabetes type 1 and poor circulation 1 Package 1     PROGRAF (BRAND) 0.5 MG capsule Take 1 capsule (0.5 mg) by mouth 2 times daily (total dose 1.5 mg twice per day) 60 capsule 11     PROGRAF (BRAND) 1 MG capsule Take 1 capsule (1 mg) by mouth 2 times daily (total dose 1.5 mg twice per day) 60 capsule 11     sodium bicarbonate 650 MG tablet TAKE ONE TABLET BY MOUTH THREE TIMES A DAY. Needs to schedule an appointment prior to refills. 270 tablet 0     sulfamethoxazole-trimethoprim (BACTRIM) 400-80 MG tablet Take 1 tablet by mouth twice a week 8 tablet 11     vitamin C (ASCORBIC ACID) 500 MG tablet TAKE TWO TABLETS BY MOUTH EVERY  tablet 3     cholecalciferol (VITAMIN D) 400 units tablet TAKE ONE TABLET BY MOUTH EVERY DAY (Patient not taking: Reported on 5/7/2019) 90 tablet 0     ganciclovir 0.15 % GEL Use one drop in right eye 5 times each day for 7 days. (Patient not taking: Reported on 5/7/2019) 1 Tube 1     Multiple Vitamin  (TAB-A-AMBER) TABS Take 1 tablet by mouth daily (Patient not taking: Reported on 5/7/2019) 90 tablet 3     multivitamin, therapeutic with minerals (MULTI-VITAMIN) TABS tablet Take 1 tablet by mouth daily (Patient not taking: Reported on 5/7/2019) 100 each 3     No Known Allergies      Review of Systems:  -As per HPI  -Constitutional: Otherwise feeling well today, in usual state of health.  -Skin: As above in HPI. No additional skin concerns.    Physical exam:  Vitals: There were no vitals taken for this visit.  GEN: This is a well developed, well-nourished male in no acute distress, in a pleasant mood.    SKIN: Total skin excluding the undergarment areas was performed. The exam included the head/face, neck, both arms, chest, back, abdomen, both legs, digits and/or nails.   -1.2 x 1.1 cm pink papule on the left medial calf.  -Hyperpigmentation on the lower extremities with stasis changes.    -Few waxy brown stuck on papules on the trunk.  -No other lesions of concern on areas examined.     Impression/Plan:  1. Neoplasm of uncertain behavior on the L medial calf. The differential diagnosis includes ruptured folliculitis vs NMSC .     Shave biopsy(performed by faculty): After discussion of benefits and risks including but not limited to bleeding, infection, scar, incomplete removal, recurrence, and non-diagnostic biopsy, written consent and photographs were obtained. Time-out was performed. The area was cleaned with isopropyl alcohol. 1 mL of 1% lidocaine with 1:100,000 epinephrine was injected to obtain adequate anesthesia of the lesion on the L medial calf. A shave biopsy was performed. Hemostasis was achieved with aluminium chloride. Vaseline and a sterile dressing were applied. The patient tolerated the procedure and no complications were noted. The patient was provided with verbal and written post care instructions.     Patient will be contacted with biopsy results.     2. 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 Seborrheic keratosis, non irritated     Benign nature was discussed.No further intervention required at this time.       CC Rosio Salas MD  [address] [place] [id number] on close of this encounter.  Follow-up in 1 year, earlier for new or changing lesions.     Staff Involved:  I, Sonal Layne, MS3, saw and examined the patient and acted as a scribe in the presence of Dr. Carvalho.    Staff Physician:  I was present with the medical student who participated in the service and in the documentation of the note. I have verified the history and personally performed the physical exam and medical decision making. I agree with the assessment and plan of care as documented in the note.     Clayton Carvalho MD  Staff Dermatologist and Dermatopathologist  , Department of Dermatology

## 2019-06-25 NOTE — NURSING NOTE
Chief Complaint   Patient presents with     Skin Check     Jose Alfredo is here today for a TSC. No concerns.        Bailee Manriquez LPN

## 2019-06-25 NOTE — NURSING NOTE
Lidocaine-epinephrine 1-1:667070 % injection   1mL once for one use, starting 6/25/2019 ending 6/25/2019,  2mL disp, R-0, injection  Injected by Denia Cole CMA

## 2019-06-25 NOTE — PATIENT INSTRUCTIONS

## 2019-06-26 RX ORDER — SODIUM BICARBONATE 650 MG/1
1300 TABLET ORAL 2 TIMES DAILY
Qty: 360 TABLET | Refills: 3 | Status: SHIPPED | OUTPATIENT
Start: 2019-06-26 | End: 2020-05-19

## 2019-06-26 NOTE — TELEPHONE ENCOUNTER
Per Dr. Beavers: Change to 1300 mg po bid.     Left detailed voicemail for patient.    Macrina Andre RN

## 2019-07-01 LAB — COPATH REPORT: NORMAL

## 2019-09-12 DIAGNOSIS — R11.15 NON-INTRACTABLE CYCLICAL VOMITING WITH NAUSEA: Primary | ICD-10-CM

## 2019-09-16 RX ORDER — ONDANSETRON 4 MG/1
TABLET, ORALLY DISINTEGRATING ORAL
Qty: 10 TABLET | Refills: 5 | Status: SHIPPED | OUTPATIENT
Start: 2019-09-16 | End: 2020-02-27

## 2019-10-04 ENCOUNTER — HEALTH MAINTENANCE LETTER (OUTPATIENT)
Age: 59
End: 2019-10-04

## 2020-01-03 DIAGNOSIS — Z94.0 S/P KIDNEY TRANSPLANT: ICD-10-CM

## 2020-01-03 DIAGNOSIS — Z94.83 PANCREAS REPLACED BY TRANSPLANT (H): ICD-10-CM

## 2020-01-03 DIAGNOSIS — G62.9 PERIPHERAL NEUROPATHY: ICD-10-CM

## 2020-01-03 DIAGNOSIS — Z94.83 PANCREAS TRANSPLANTED (H): ICD-10-CM

## 2020-01-03 DIAGNOSIS — Z94.0 KIDNEY REPLACED BY TRANSPLANT: ICD-10-CM

## 2020-01-03 RX ORDER — MYCOPHENOLATE MOFETIL 500 MG/1
500 TABLET, FILM COATED ORAL 2 TIMES DAILY
Qty: 60 TABLET | Refills: 11 | Status: SHIPPED | OUTPATIENT
Start: 2020-01-03 | End: 2020-11-25

## 2020-01-03 RX ORDER — TACROLIMUS 1 MG/1
1 CAPSULE, GELATIN COATED ORAL 2 TIMES DAILY
Qty: 60 CAPSULE | Refills: 11 | Status: SHIPPED | OUTPATIENT
Start: 2020-01-03 | End: 2020-11-25

## 2020-01-03 RX ORDER — TACROLIMUS 0.5 MG/1
0.5 CAPSULE, GELATIN COATED ORAL 2 TIMES DAILY
Qty: 60 CAPSULE | Refills: 11 | Status: SHIPPED | OUTPATIENT
Start: 2020-01-03 | End: 2020-11-25

## 2020-01-07 RX ORDER — ASCORBIC ACID 500 MG
1000 TABLET ORAL DAILY
Qty: 180 TABLET | Refills: 3 | Status: SHIPPED | OUTPATIENT
Start: 2020-01-07 | End: 2022-07-12

## 2020-01-07 NOTE — TELEPHONE ENCOUNTER
VITAMIN C 500MG TABS    Last Written Prescription Date:  2/5/2019  Last Fill Quantity: 180,   # refills: 3  Last Office Visit : 2/28/2019  Future Office visit:  None  180 Tabs, 3 Refill sent to pharmacy for Pt care.    1/7/2020    Svetlana Salgado RN  Central Triage Red Flags/Med Refills

## 2020-01-28 DIAGNOSIS — Z94.0 S/P KIDNEY TRANSPLANT: ICD-10-CM

## 2020-01-29 NOTE — TELEPHONE ENCOUNTER
SM ASPIRIN ADULT LOW STRENG 81 TBEC   Last Written Prescription Date:  12/23/2015  Last Fill Quantity: 100,   # refills: 3  Last Office Visit : 2/28/2019  Future Office visit:  None    Routing refill request to provider for review/approval because:  Med has not been ordered since Dec 2015.    Referring to clinic for review and refills.    Svetlana Salgado RN  Central Triage Red Flags/Med Refills

## 2020-02-25 DIAGNOSIS — K21.9 ESOPHAGEAL REFLUX: ICD-10-CM

## 2020-02-25 DIAGNOSIS — Z94.0 S/P KIDNEY TRANSPLANT: ICD-10-CM

## 2020-02-25 DIAGNOSIS — R11.15 NON-INTRACTABLE CYCLICAL VOMITING WITH NAUSEA: ICD-10-CM

## 2020-02-27 RX ORDER — ONDANSETRON 4 MG/1
4 TABLET, ORALLY DISINTEGRATING ORAL EVERY 8 HOURS PRN
Qty: 10 TABLET | Refills: 0 | Status: SHIPPED | OUTPATIENT
Start: 2020-02-27 | End: 2020-03-25

## 2020-02-27 RX ORDER — OMEGA-3S/DHA/EPA/FISH OIL/D3 300MG-1000
400 CAPSULE ORAL DAILY
Qty: 90 TABLET | Refills: 0 | Status: SHIPPED | OUTPATIENT
Start: 2020-02-27 | End: 2020-05-21

## 2020-02-27 NOTE — TELEPHONE ENCOUNTER
Last Clinic Visit: 2/28/19, no upcoming appointments, 90 day ihsan RX's provided, routed to clinic to contact to schedule appointment

## 2020-03-24 DIAGNOSIS — R11.15 NON-INTRACTABLE CYCLICAL VOMITING WITH NAUSEA: ICD-10-CM

## 2020-03-24 DIAGNOSIS — K21.9 ESOPHAGEAL REFLUX: ICD-10-CM

## 2020-03-25 RX ORDER — ONDANSETRON 4 MG/1
4 TABLET, ORALLY DISINTEGRATING ORAL EVERY 8 HOURS PRN
Qty: 10 TABLET | Refills: 2 | Status: SHIPPED | OUTPATIENT
Start: 2020-03-25 | End: 2020-07-10

## 2020-03-25 NOTE — TELEPHONE ENCOUNTER
Last Clinic Visit: 2/28/19, no upcoming visits scheduled.  Due to health crisis, not routed to clinic to contact to schedule.  90 day RX provided

## 2020-03-26 DIAGNOSIS — K21.9 ESOPHAGEAL REFLUX: ICD-10-CM

## 2020-03-26 NOTE — TELEPHONE ENCOUNTER
OMEPRAZOLE 20MG CPDR    Last Written Prescription Date:  2/27/2020  Last Fill Quantity: 180,   # refills: 0  Last Office Visit : 2/28/2019  Future Office visit:  None  180 Tabs, 0 Refills sent to pharm 3/26/2020  Refill Protocol over ridden due to Coronavirus outbreak    Svetlana Salgado RN  Central Triage Red Flags/Med Refills

## 2020-04-21 DIAGNOSIS — Z94.83 PANCREAS REPLACED BY TRANSPLANT (H): ICD-10-CM

## 2020-04-21 DIAGNOSIS — Z94.0 KIDNEY REPLACED BY TRANSPLANT: Primary | ICD-10-CM

## 2020-04-21 RX ORDER — SULFAMETHOXAZOLE AND TRIMETHOPRIM 400; 80 MG/1; MG/1
1 TABLET ORAL
Qty: 8 TABLET | Refills: 1 | Status: SHIPPED | OUTPATIENT
Start: 2020-04-23 | End: 2020-06-10

## 2020-05-12 ENCOUNTER — TELEPHONE (OUTPATIENT)
Dept: INTERNAL MEDICINE | Facility: CLINIC | Age: 60
End: 2020-05-12

## 2020-05-12 ENCOUNTER — MEDICAL CORRESPONDENCE (OUTPATIENT)
Dept: HEALTH INFORMATION MANAGEMENT | Facility: CLINIC | Age: 60
End: 2020-05-12

## 2020-05-12 NOTE — TELEPHONE ENCOUNTER
Spoke to forms staff on-site who states that form is in providers in-box for completion.     Karis Tanner, Forms  5/12/2020 2:16 PM

## 2020-05-12 NOTE — TELEPHONE ENCOUNTER
I scheduled the patient for telephone visit for this Friday to discuss getting paperwork for orthotic filled out.   Gricelda Hernandez, EMT at 12:52 PM on 5/12/2020.

## 2020-05-12 NOTE — TELEPHONE ENCOUNTER
[facility] Call Center    Phone Message    May a detailed message be left on voicemail: yes     Reason for Call: Please call Beverley with the status on this paperwork, please. Beverley is asking for the completion of the paperwork and return date to her. Thank you.      Action Taken: Message routed to:  [facility]    Travel Screening: Not Applicable

## 2020-05-15 ENCOUNTER — VIRTUAL VISIT (OUTPATIENT)
Dept: INTERNAL MEDICINE | Facility: CLINIC | Age: 60
End: 2020-05-15
Payer: MEDICARE

## 2020-05-15 DIAGNOSIS — Z94.83 PANCREAS TRANSPLANTED (H): ICD-10-CM

## 2020-05-15 DIAGNOSIS — E55.9 VITAMIN D DEFICIENCY: ICD-10-CM

## 2020-05-15 DIAGNOSIS — I10 BENIGN ESSENTIAL HYPERTENSION: Primary | ICD-10-CM

## 2020-05-15 DIAGNOSIS — R79.9 ABNORMAL FINDING OF BLOOD CHEMISTRY, UNSPECIFIED: ICD-10-CM

## 2020-05-15 DIAGNOSIS — Z86.39 HISTORY OF DIABETES MELLITUS, TYPE I: ICD-10-CM

## 2020-05-15 DIAGNOSIS — Z94.0 S/P KIDNEY TRANSPLANT: ICD-10-CM

## 2020-05-15 DIAGNOSIS — G62.9 PERIPHERAL POLYNEUROPATHY: ICD-10-CM

## 2020-05-15 ASSESSMENT — PAIN SCALES - GENERAL: PAINLEVEL: NO PAIN (0)

## 2020-05-15 NOTE — PROGRESS NOTES
"    Jose Alfredo Tomlinson is a 59 year old male who is being evaluated via a billable telephone visit.      The patient has been notified of following:     \"This telephone visit will be conducted via a call between you and your physician/provider. The patient has consented to a video visit and informed that video and telephone visits are being performed during the COVID-19 pandemic in order to mitigate the risk of an in office visit for appropriate candidates/issues. We have found that certain health care needs can be provided without the need for a physical exam.  This service lets us provide the care you need with a short phone conversation.  If a prescription is necessary we can send it directly to your pharmacy.  If lab work is needed we can place an order for that and you can then stop by our lab to have the test done at a later time.    Telephone visits are billed at different rates depending on your insurance coverage. During this emergency period, for some insurers they may be billed the same as an in-person visit.  Please reach out to your insurance provider with any questions.    If during the course of the call the physician/provider feels a telephone visit is not appropriate, you will not be charged for this service. If the provider feels that they are unable to assess your concerns without an in person visit, you will be advised of this limitation and depending on the nature of the concern, advised to seek in person care if your provider feels you need urgent evaluation.\"    Patient has given verbal consent for Telephone visit?  Yes    How would you like to obtain your AVS? MyChart    Subjective     Jose Alfredo Tomlinson is a 59 year old male who presents to clinic today for the following health issues:  Chief Complaint   Patient presents with     Forms     Complete orthopedicts paper work.        HPI    Jose Alfredo is seen today for DME request.  He is s/p eye surgery for ptosis on the left. He reports ongoing " intermittent ptosis.  He needs a strap for a brace. Already has brace. Uses for foot drop bilaterally.  Old one was worn, velcro no longer working.  Also will need new diabetic shoes.    No concerns about BP or meds. Mood good. No fevers, malaise, cough, chest pain, dyspnea, GI/ concerns. Feeling well.  Has deferred routine exams given COVID.    [place] 3/19:  Impression:          - Five less than 1 mm polyps in the cecum, removed with                        a cold snare. Resected and retrieved.                        - Diverticulosis in the sigmoid colon.      Routine Health Maintenence:  Depression Screening:   PHQ-2 ( 1999 Pfizer) 5/15/2020 2/1/2019   Q1: Little interest or pleasure in doing things 0 0   Q2: Feeling down, depressed or hopeless 0 0   PHQ-2 Score 0 0     Immunizations (zoster, pneumovax, flu, Tdap, Hep A/B):   Most Recent Immunizations   Administered Date(s) Administered     FLU 6-35 months 11/03/2015     Influenza (H1N1) 12/14/2009     Influenza (IIV3) PF 09/22/2010     Influenza Vaccine IM > 6 months Valent IIV4 09/08/2019     Pneumo Conj 13-V (2010&after) 03/03/2015     Pneumococcal 23 valent 01/25/2016     TD (ADULT, 7+) 08/26/2013   Deferred Date(s) Deferred     Zoster vaccine recombinant adjuvanted (SHINGRIX) 02/01/2019     Lipids:   Recent Labs   Lab Test 02/01/19  0958 05/10/16  0905 03/03/15  1106   CHOL 164 137 128   HDL 44 49 47   * 71 63   TRIG 82 88 87   CHOLHDLRATIO  --   --  2.7     PSA (50-75 yrs): No results found for: PSA   AAA Screening (65-75 yrs): n/a  Lung Ca Screening (>30 pk age 55-79 or >20 py age 50-79 + RF): n/a  Colonoscopy (50-75 yrs): 3/19  Dexa (>65W or 70M yrs): deferred  HIV/HCV if risk factors:  Advanced Directive:    Patient Active Problem List   Diagnosis     Pancreas transplanted (H)     S/P kidney transplant     History of diabetes mellitus, type I     Hyperlipidemia     Peripheral neuropathy     PAD (peripheral artery disease) (H)     Skin exam,  screening for cancer     Seborrheic keratosis     Acquired perforating dermatosis     Cellulitis     Atrial bigeminy     Kidney replaced by transplant     Pancreas replaced by transplant (H)     Benign essential hypertension     Diabetic neuropathy, type I diabetes mellitus (H)     Right foot pain     Left foot pain     Tactile anesthesia     Personal history of diabetic foot ulcer     Right foot drop     Wrist drop, bilateral     Swelling of limb     Immunosuppression (H)     Past Surgical History:   Procedure Laterality Date     CATARACT IOL, RT/LT Bilateral      COLONOSCOPY  12/31/2013    Procedure: COMBINED COLONOSCOPY, SINGLE BIOPSY/POLYPECTOMY BY BIOPSY;  COLONOSCOPY;  Surgeon: Isac Colby MD;  Location:  GI     ESOPHAGOSCOPY, GASTROSCOPY, DUODENOSCOPY (EGD), COMBINED  1/20/2012    Procedure:COMBINED ESOPHAGOSCOPY, GASTROSCOPY, DUODENOSCOPY (EGD); Surgeon:GAB MARTIN; Location:[facility]     REPAIR PTOSIS Left 3/15/2019    Procedure: LEFT UPPER EYELID PTOSIS REPAIR;  Surgeon: Rj Jose MD;  Location:  OR     TRANSPLANT  2007    K/P       Social History     Tobacco Use     Smoking status: Never Smoker     Smokeless tobacco: Never Used   Substance Use Topics     Alcohol use: No     Family History   Problem Relation Age of Onset     Diabetes Son      Arthritis Mother      Diabetes Father      Diabetes Paternal Grandfather      Cancer Paternal Grandfather         Skin cancer     Cancer Maternal Grandfather         Skin cancer     Glaucoma No family hx of      Macular Degeneration No family hx of          Current Outpatient Medications   Medication Sig Dispense Refill     amLODIPine (NORVASC) 5 MG tablet Take 1 tablet (5 mg) by mouth daily 90 tablet 11     ascorbic acid (VITAMIN C) 500 MG tablet Take 2 tablets (1,000 mg) by mouth daily 180 tablet 1     aspirin (SM ASPIRIN ADULT LOW STRENGTH) 81 MG EC tablet Take 1 tablet (81 mg) by mouth daily DO NOT CRUSH. 90 tablet 0     AZITHROMYCIN  PO Take 250 mg by mouth twice a week       CELLCEPT (BRAND) 500 MG tablet Take 1 tablet (500 mg) by mouth 2 times daily 60 tablet 11     cholecalciferol (VITAMIN D) 10 MCG (400 UNIT) tablet Take 1 tablet (400 Units) by mouth daily For additional refills please schedule a follow-up appointment with Dr Salas at [phone] 90 tablet 0     Cholecalciferol 400 UNITS CAPS Take 1 capsule by mouth daily 90 capsule 3     lisinopril (PRINIVIL/ZESTRIL) 40 MG tablet Take 1 tablet (40 mg) by mouth daily 30 tablet 1     multivitamin, therapeutic with minerals (MULTI-VITAMIN) TABS tablet Take 1 tablet by mouth daily 100 each 3     omeprazole (PRILOSEC) 20 MG DR capsule Take 1 capsule (20 mg) by mouth 2 times daily For additional refills please schedule a follow-up appointment with Dr Salas at [phone] 180 capsule 0     ondansetron (ZOFRAN-ODT) 4 MG ODT tab Take 1 tablet (4 mg) by mouth every 8 hours as needed for nausea Dissolve on tongue Please schedule appointment with Dr Salas [phone] 10 tablet 2     order for DME Right carbon fiber AFO replacement due to fracture at posterior strut and due to age of brace greater than 5 years.  Patient requires AFOs for support and stability to enable safe ambulation 1 Units 1     order for DME Equipment being ordered: therapeutic shoes and insoles. For Peripheral neuropathy, diabetes type 1 and poor circulation 1 Package 1     PROGRAF (BRAND) 0.5 MG capsule Take 1 capsule (0.5 mg) by mouth 2 times daily (total dose 1.5 mg twice per day) 60 capsule 11     sodium bicarbonate 650 MG tablet Take 2 tablets (1,300 mg) by mouth 2 times daily 360 tablet 3     sulfamethoxazole-trimethoprim (BACTRIM) 400-80 MG tablet Take 1 tablet by mouth twice a week 8 tablet 1     vitamin C (ASCORBIC ACID) 500 MG tablet Take 2 tablets (1,000 mg) by mouth daily 180 tablet 3     cholecalciferol (VITAMIN D) 400 units tablet TAKE ONE TABLET BY MOUTH EVERY DAY (Patient not taking: Reported on 5/7/2019)  "90 tablet 0     ganciclovir 0.15 % GEL Use one drop in right eye 5 times each day for 7 days. (Patient not taking: Reported on 5/7/2019) 1 Tube 1     lisinopril (PRINIVIL/ZESTRIL) 40 MG tablet Take 1 tablet (40 mg) by mouth daily (Patient not taking: Reported on 5/15/2020) 90 tablet 11     Multiple Vitamin (TAB-A-AMBER) TABS Take 1 tablet by mouth daily (Patient not taking: Reported on 5/7/2019) 90 tablet 3     omeprazole (PRILOSEC) 20 MG CR capsule Take 1 capsule (20 mg) by mouth 2 times daily (Patient not taking: Reported on 5/15/2020) 180 capsule 2     omeprazole (PRILOSEC) 20 MG DR capsule Take 1 capsule (20 mg) by mouth 2 times daily 180 capsule 0     omeprazole (PRILOSEC) 20 MG DR capsule Take 1 capsule (20 mg) by mouth 2 times daily (Patient not taking: Reported on 5/15/2020) 180 capsule 3     PROGRAF (BRAND) 1 MG capsule Take 1 capsule (1 mg) by mouth 2 times daily (total dose 1.5 mg twice per day) (Patient not taking: Reported on 5/15/2020) 60 capsule 11     No Known Allergies    Reviewed and updated as needed this visit by Provider    Review of Systems   Comprehensive review of systems negative unless indicated in the HPI.       Physical Exam   Reported vitals:  There were no vitals taken for this visit.   Wt Readings from Last 2 Encounters:   05/07/19 89.4 kg (197 lb)   03/22/19 86.5 kg (190 lb 9.6 oz)    Estimated body mass index is 27.28 kg/m  as calculated from the following:    Height as of 3/22/19: 1.81 m (5' 11.26\").    Weight as of 5/7/19: 89.4 kg (197 lb).   Gen: healthy, alert, no distress and cooperative  Psych: Alert and oriented times 3; coherent speech, normal   rate and volume, able to articulate logical thoughts, able   to abstract reason, no tangential thoughts, no hallucinations   or delusions, affect is normal/bright.  Respiratory: Speaking in full sentences, unlabored, no audible wheezes or cough.  Remainder of exam unable to be completed due to telephone visits    Diagnostic Test " Results:  Labs reviewed in Epic      Assessment/Plan:  Jose Alfredo was seen today for forms.    Diagnoses and all orders for this visit:    Benign essential hypertension  -     Comprehensive metabolic panel **(3 MO); Future    S/P kidney transplant  -     Hemoglobin A1c **(3 MO); Future  -     Comprehensive metabolic panel **(3 MO); Future  -     Albumin Random Urine Quantitative with Creat Ratio    Pancreas transplanted (H)  -     Hemoglobin A1c **(3 MO); Future  -     Comprehensive metabolic panel **(3 MO); Future  -     Albumin Random Urine Quantitative with Creat Ratio    Peripheral polyneuropathy  -     Okay to fill out paperwork for orthotics and strap     Vitamin D deficiency  -     Vitamin D Deficiency **(3 MO); Future    History of diabetes mellitus, type I  -     Hemoglobin A1c **(3 MO); Future  -     Lipid panel reflex to direct LDL Fasting; Future  -     TSH with free T4 reflex **(3 MO); Future  -     Albumin Random Urine Quantitative with Creat Ratio    Abnormal finding of blood chemistry, unspecified   -     Lipid panel reflex to direct LDL Fasting; Future      Phone call duration: 9  minutes  8:09 AM 8:18 AM        Rosio Salas MD  Internal Medicine

## 2020-05-15 NOTE — NURSING NOTE
Chief Complaint   Patient presents with     Forms     Complete orthopedicts paper work.      Rebecca Jordan, RMA 7:02 AM  5/15/2020

## 2020-05-18 NOTE — NURSING NOTE
Patient will call for appointment  AVS sent via my chart  Luanne Wayne CMA at 8:59 AM on 5/18/2020.

## 2020-05-19 DIAGNOSIS — Z94.0 HTN, KIDNEY TRANSPLANT RELATED: ICD-10-CM

## 2020-05-19 DIAGNOSIS — I15.1 HTN, KIDNEY TRANSPLANT RELATED: ICD-10-CM

## 2020-05-19 DIAGNOSIS — Z94.0 S/P KIDNEY TRANSPLANT: ICD-10-CM

## 2020-05-19 DIAGNOSIS — I10 BENIGN ESSENTIAL HYPERTENSION: ICD-10-CM

## 2020-05-19 DIAGNOSIS — Z94.0 KIDNEY REPLACED BY TRANSPLANT: ICD-10-CM

## 2020-05-19 DIAGNOSIS — Z94.83 PANCREAS REPLACED BY TRANSPLANT (H): ICD-10-CM

## 2020-05-19 RX ORDER — AMLODIPINE BESYLATE 5 MG/1
5 TABLET ORAL DAILY
Qty: 90 TABLET | Refills: 11 | Status: SHIPPED | OUTPATIENT
Start: 2020-05-19 | End: 2022-07-12

## 2020-05-19 RX ORDER — LISINOPRIL 40 MG/1
40 TABLET ORAL DAILY
Qty: 90 TABLET | Refills: 11 | Status: SHIPPED | OUTPATIENT
Start: 2020-05-19 | End: 2021-01-28

## 2020-05-19 RX ORDER — SODIUM BICARBONATE 650 MG/1
1300 TABLET ORAL 2 TIMES DAILY
Qty: 360 TABLET | Refills: 3 | Status: SHIPPED | OUTPATIENT
Start: 2020-05-19 | End: 2020-12-22

## 2020-05-21 RX ORDER — OMEGA-3S/DHA/EPA/FISH OIL/D3 300MG-1000
400 CAPSULE ORAL DAILY
Qty: 90 TABLET | Refills: 3 | Status: SHIPPED | OUTPATIENT
Start: 2020-05-21 | End: 2021-01-28

## 2020-05-21 NOTE — TELEPHONE ENCOUNTER
VITAMIN D3 10 MCG(400 UNIT) TABS    Last Written Prescription Date:  2/27/2020  Last Fill Quantity: 90,   # refills: 0  Last Office Visit : 5/15/2020  Future Office visit:  None  90 Tabs, 3 Refills sent to pharm 5/21/2020    SM ASPIRIN ADULT LOW STRENG 81 TBEC    Last Written Prescription Date:  1/9/2019  Last Fill Quantity: 90,   # refills: 0  Last Office Visit : 5/15/2020  Future Office visit:  None  90 Tabs, 3 Refills sent to pharm 5/21/2020    Svetlana Salgado RN  Central Triage Red Flags/Med Refills

## 2020-06-10 DIAGNOSIS — Z94.0 KIDNEY REPLACED BY TRANSPLANT: Primary | ICD-10-CM

## 2020-06-10 DIAGNOSIS — Z94.83 PANCREAS REPLACED BY TRANSPLANT (H): ICD-10-CM

## 2020-06-10 RX ORDER — SULFAMETHOXAZOLE AND TRIMETHOPRIM 400; 80 MG/1; MG/1
1 TABLET ORAL
Qty: 8 TABLET | Refills: 0 | Status: SHIPPED | OUTPATIENT
Start: 2020-06-11 | End: 2020-07-07

## 2020-07-07 DIAGNOSIS — Z94.83 PANCREAS REPLACED BY TRANSPLANT (H): ICD-10-CM

## 2020-07-07 DIAGNOSIS — R11.15 NON-INTRACTABLE CYCLICAL VOMITING WITH NAUSEA: ICD-10-CM

## 2020-07-07 DIAGNOSIS — Z94.0 KIDNEY REPLACED BY TRANSPLANT: ICD-10-CM

## 2020-07-07 RX ORDER — SULFAMETHOXAZOLE AND TRIMETHOPRIM 400; 80 MG/1; MG/1
1 TABLET ORAL
Qty: 8 TABLET | Refills: 0 | Status: SHIPPED | OUTPATIENT
Start: 2020-07-09 | End: 2020-08-04

## 2020-07-10 RX ORDER — ONDANSETRON 4 MG/1
4 TABLET, ORALLY DISINTEGRATING ORAL EVERY 8 HOURS PRN
Qty: 10 TABLET | Refills: 2 | Status: SHIPPED | OUTPATIENT
Start: 2020-07-10 | End: 2020-10-05

## 2020-07-10 NOTE — TELEPHONE ENCOUNTER
ONDANSETRON 4MG TBDP   Last Written Prescription Date:  3/25/2020  Last Fill Quantity: 10,   # refills: 2  Last Office Visit : 5/15/2020  Future Office visit:  None  10 Tabs, 2 Refills sent to pharm 7/10/2020    Svetlana Salgado RN  Central Triage Red Flags/Med Refills

## 2020-08-04 DIAGNOSIS — Z94.0 KIDNEY REPLACED BY TRANSPLANT: Primary | ICD-10-CM

## 2020-08-04 DIAGNOSIS — K21.9 ESOPHAGEAL REFLUX: Primary | ICD-10-CM

## 2020-08-04 DIAGNOSIS — Z94.83 PANCREAS REPLACED BY TRANSPLANT (H): ICD-10-CM

## 2020-08-04 RX ORDER — SULFAMETHOXAZOLE AND TRIMETHOPRIM 400; 80 MG/1; MG/1
1 TABLET ORAL
Qty: 8 TABLET | Refills: 0 | Status: SHIPPED | OUTPATIENT
Start: 2020-08-06 | End: 2020-09-01

## 2020-08-08 NOTE — TELEPHONE ENCOUNTER
5/15/2020  [facility]   Rosio Salas MD   Internal Medicine

## 2020-09-01 ENCOUNTER — TELEPHONE (OUTPATIENT)
Dept: TRANSPLANT | Facility: CLINIC | Age: 60
End: 2020-09-01

## 2020-09-01 DIAGNOSIS — Z94.83 PANCREAS REPLACED BY TRANSPLANT (H): Primary | ICD-10-CM

## 2020-09-01 DIAGNOSIS — Z79.899 OTHER LONG TERM (CURRENT) DRUG THERAPY: ICD-10-CM

## 2020-09-01 DIAGNOSIS — Z94.0 KIDNEY REPLACED BY TRANSPLANT: ICD-10-CM

## 2020-09-01 RX ORDER — SULFAMETHOXAZOLE AND TRIMETHOPRIM 400; 80 MG/1; MG/1
1 TABLET ORAL
Qty: 8 TABLET | Refills: 0 | Status: SHIPPED | OUTPATIENT
Start: 2020-09-03 | End: 2020-10-05

## 2020-09-01 NOTE — TELEPHONE ENCOUNTER
ISSUE:  No labs since 2/2019 and last appointment 5/2019.     PLAN:  Call and ask that he get transplant labs drawn asap   Remind him he should be getting labs a monthly as he is a SABINE transplant  Let him know scheduling will be contacting him to schedule annual appointment.   I will send a 1 month refill for bactrim, but needs lab and appointment for further refills    LPN TASK:  Call with above instructions  Update lab orders

## 2020-09-02 NOTE — TELEPHONE ENCOUNTER
Call placed to patient. Patient v\u to complete SOT labs asap then monthly. Also to schedule annual transplant appointment. Order sent

## 2020-10-01 DIAGNOSIS — Z94.0 KIDNEY REPLACED BY TRANSPLANT: ICD-10-CM

## 2020-10-01 DIAGNOSIS — Z94.83 PANCREAS REPLACED BY TRANSPLANT (H): ICD-10-CM

## 2020-10-01 DIAGNOSIS — R11.15 NON-INTRACTABLE CYCLICAL VOMITING WITH NAUSEA: ICD-10-CM

## 2020-10-05 RX ORDER — ONDANSETRON 4 MG/1
TABLET, ORALLY DISINTEGRATING ORAL
Qty: 10 TABLET | Refills: 2 | Status: SHIPPED | OUTPATIENT
Start: 2020-10-05 | End: 2020-12-17

## 2020-10-05 RX ORDER — SULFAMETHOXAZOLE AND TRIMETHOPRIM 400; 80 MG/1; MG/1
1 TABLET ORAL
Qty: 8 TABLET | Refills: 0 | Status: SHIPPED | OUTPATIENT
Start: 2020-10-05 | End: 2020-10-27

## 2020-10-27 DIAGNOSIS — Z94.0 KIDNEY REPLACED BY TRANSPLANT: ICD-10-CM

## 2020-10-27 DIAGNOSIS — Z94.83 PANCREAS REPLACED BY TRANSPLANT (H): ICD-10-CM

## 2020-10-27 RX ORDER — SULFAMETHOXAZOLE AND TRIMETHOPRIM 400; 80 MG/1; MG/1
1 TABLET ORAL
Qty: 4 TABLET | Refills: 0 | Status: SHIPPED | OUTPATIENT
Start: 2020-10-29 | End: 2020-11-25

## 2020-10-27 NOTE — TELEPHONE ENCOUNTER
No labs since Feb. 2019.  Called Jose Alfredo to discuss.  No answer, left a v/m requesting he obtain transplant labs ASAP.   2 week supply sent.

## 2020-11-03 ENCOUNTER — VIRTUAL VISIT (OUTPATIENT)
Dept: NEPHROLOGY | Facility: CLINIC | Age: 60
End: 2020-11-03
Attending: INTERNAL MEDICINE
Payer: MEDICARE

## 2020-11-03 DIAGNOSIS — Z48.298 AFTERCARE FOLLOWING ORGAN TRANSPLANT: Primary | ICD-10-CM

## 2020-11-03 PROCEDURE — 99207 PR NO CHARGE LOS: CPT

## 2020-11-03 NOTE — LETTER
11/3/2020       RE: Jose Alfredo Tomlinson  [address] [place] [address] [address]  [place] [address]     Dear Colleague,    Thank you for referring your patient, Jose Alfredo Tomlinson, to the [facility] [place] at [facility]. Please see a copy of my visit note below.    Was not able to connect on several occasions         Again, thank you for allowing me to participate in the care of your patient.      Sincerely,    Kidney/Pancreas Recipient

## 2020-11-09 ENCOUNTER — DOCUMENTATION ONLY (OUTPATIENT)
Dept: TRANSPLANT | Facility: CLINIC | Age: 60
End: 2020-11-09

## 2020-11-09 NOTE — PROGRESS NOTES
Issue:  No show'd nephrology appointment on 11/3.    Outcome:  Message sent to scheduling to reschedule appointment.  My Chart message sent to Jose Alfredo to have a set of labs drawn and reschedule appointment.

## 2020-11-14 ENCOUNTER — HEALTH MAINTENANCE LETTER (OUTPATIENT)
Age: 60
End: 2020-11-14

## 2020-11-24 DIAGNOSIS — G62.9 PERIPHERAL NEUROPATHY: ICD-10-CM

## 2020-11-24 DIAGNOSIS — Z94.0 S/P KIDNEY TRANSPLANT: ICD-10-CM

## 2020-11-24 DIAGNOSIS — Z94.83 PANCREAS TRANSPLANTED (H): Primary | ICD-10-CM

## 2020-11-25 ENCOUNTER — TELEPHONE (OUTPATIENT)
Dept: TRANSPLANT | Facility: CLINIC | Age: 60
End: 2020-11-25

## 2020-11-25 DIAGNOSIS — Z94.83 PANCREAS REPLACED BY TRANSPLANT (H): ICD-10-CM

## 2020-11-25 DIAGNOSIS — Z94.0 KIDNEY REPLACED BY TRANSPLANT: Primary | ICD-10-CM

## 2020-11-25 RX ORDER — MYCOPHENOLATE MOFETIL 500 MG/1
500 TABLET, FILM COATED ORAL 2 TIMES DAILY
Qty: 60 TABLET | Refills: 0 | Status: SHIPPED | OUTPATIENT
Start: 2020-11-25 | End: 2020-12-15

## 2020-11-25 RX ORDER — TACROLIMUS 1 MG/1
1 CAPSULE, GELATIN COATED ORAL 2 TIMES DAILY
Qty: 60 CAPSULE | Refills: 0 | Status: SHIPPED | OUTPATIENT
Start: 2020-11-25 | End: 2020-12-15

## 2020-11-25 RX ORDER — SULFAMETHOXAZOLE AND TRIMETHOPRIM 400; 80 MG/1; MG/1
1 TABLET ORAL
Qty: 8 TABLET | Refills: 0 | Status: SHIPPED | OUTPATIENT
Start: 2020-11-26 | End: 2020-12-15

## 2020-11-25 RX ORDER — TACROLIMUS 0.5 MG/1
0.5 CAPSULE, GELATIN COATED ORAL 2 TIMES DAILY
Qty: 60 CAPSULE | Refills: 0 | Status: SHIPPED | OUTPATIENT
Start: 2020-11-25 | End: 2020-12-15

## 2020-11-25 NOTE — TELEPHONE ENCOUNTER
Received refill requests for patients prograf, bactrim, and cellcept.  Last labs 2/2019 and due for a follow up nephrology appointment, no show on 11/3/20.   Called and spoke with Jose Alfredo.  Discussed that he is way over due for transplant labs.  Explained the importance of routine lab monitoring as well as drug levels in order to continue prescribing his medication.  He states he will go in the next couple of weeks.  He is rescheduled for a transplant nephrology appointment in January.  Encouraged him to keep that appt as it has been over a year since his last clinic visit.     1 month refill sent.

## 2020-11-29 RX ORDER — ASCORBIC ACID 500 MG
TABLET ORAL
Qty: 180 TABLET | Refills: 1 | Status: SHIPPED | OUTPATIENT
Start: 2020-11-29 | End: 2021-01-28

## 2020-12-15 ENCOUNTER — TELEPHONE (OUTPATIENT)
Dept: TRANSPLANT | Facility: CLINIC | Age: 60
End: 2020-12-15

## 2020-12-15 DIAGNOSIS — Z94.83 PANCREAS REPLACED BY TRANSPLANT (H): ICD-10-CM

## 2020-12-15 DIAGNOSIS — R11.15 NON-INTRACTABLE CYCLICAL VOMITING WITH NAUSEA: ICD-10-CM

## 2020-12-15 DIAGNOSIS — Z94.0 KIDNEY REPLACED BY TRANSPLANT: Primary | ICD-10-CM

## 2020-12-15 DIAGNOSIS — Z94.0 KIDNEY REPLACED BY TRANSPLANT: ICD-10-CM

## 2020-12-15 RX ORDER — MYCOPHENOLATE MOFETIL 500 MG/1
TABLET, FILM COATED ORAL
Qty: 60 TABLET | Refills: 0 | Status: SHIPPED | OUTPATIENT
Start: 2020-12-15 | End: 2020-12-21

## 2020-12-15 RX ORDER — SULFAMETHOXAZOLE AND TRIMETHOPRIM 400; 80 MG/1; MG/1
1 TABLET ORAL
Qty: 8 TABLET | Refills: 0 | OUTPATIENT
Start: 2020-12-17

## 2020-12-15 RX ORDER — SULFAMETHOXAZOLE AND TRIMETHOPRIM 400; 80 MG/1; MG/1
TABLET ORAL
Qty: 8 TABLET | Refills: 0 | Status: SHIPPED | OUTPATIENT
Start: 2020-12-15 | End: 2021-01-12

## 2020-12-15 RX ORDER — MYCOPHENOLATE MOFETIL 500 MG/1
500 TABLET, FILM COATED ORAL 2 TIMES DAILY
Qty: 60 TABLET | Refills: 0 | OUTPATIENT
Start: 2020-12-15

## 2020-12-15 RX ORDER — TACROLIMUS 0.5 MG/1
0.5 CAPSULE, GELATIN COATED ORAL 2 TIMES DAILY
Qty: 60 CAPSULE | Refills: 0 | OUTPATIENT
Start: 2020-12-15

## 2020-12-15 RX ORDER — TACROLIMUS 1 MG/1
CAPSULE, GELATIN COATED ORAL
Qty: 60 CAPSULE | Refills: 0 | Status: SHIPPED | OUTPATIENT
Start: 2020-12-15 | End: 2020-12-21

## 2020-12-15 RX ORDER — TACROLIMUS 0.5 MG/1
CAPSULE, GELATIN COATED ORAL
Qty: 60 CAPSULE | Refills: 0 | Status: SHIPPED | OUTPATIENT
Start: 2020-12-15 | End: 2020-12-21

## 2020-12-15 RX ORDER — TACROLIMUS 1 MG/1
1 CAPSULE, GELATIN COATED ORAL 2 TIMES DAILY
Qty: 60 CAPSULE | Refills: 0 | OUTPATIENT
Start: 2020-12-15

## 2020-12-15 NOTE — TELEPHONE ENCOUNTER
Called Jose Alfredo.  He still has not got transplant labs.  He reports he does not drive and he can't find a ride.  He doesn't want to take a cab because then he is driving with someone and isn't comfortable with that due to COVID.   He will work on trying to find a ride.  I explained that these medications are based on lab levels and also reviewed the importance of obtaining routine labs to monitor his organ function.  He states he will work on it.

## 2020-12-17 RX ORDER — ONDANSETRON 4 MG/1
TABLET, ORALLY DISINTEGRATING ORAL
Qty: 10 TABLET | Refills: 2 | Status: SHIPPED | OUTPATIENT
Start: 2020-12-17 | End: 2021-03-15

## 2020-12-21 DIAGNOSIS — Z94.0 KIDNEY REPLACED BY TRANSPLANT: Primary | ICD-10-CM

## 2020-12-21 DIAGNOSIS — Z94.83 PANCREAS REPLACED BY TRANSPLANT (H): ICD-10-CM

## 2020-12-21 RX ORDER — TACROLIMUS 1 MG/1
1 CAPSULE, GELATIN COATED ORAL 2 TIMES DAILY
Qty: 60 CAPSULE | Refills: 0 | Status: SHIPPED | OUTPATIENT
Start: 2020-12-21 | End: 2021-01-12

## 2020-12-21 RX ORDER — TACROLIMUS 0.5 MG/1
0.5 CAPSULE, GELATIN COATED ORAL 2 TIMES DAILY
Qty: 60 CAPSULE | Refills: 0 | Status: SHIPPED | OUTPATIENT
Start: 2020-12-21 | End: 2021-01-12

## 2020-12-21 RX ORDER — MYCOPHENOLATE MOFETIL 500 MG/1
500 TABLET, FILM COATED ORAL 2 TIMES DAILY
Qty: 60 TABLET | Refills: 0 | Status: SHIPPED | OUTPATIENT
Start: 2020-12-21 | End: 2021-01-12

## 2020-12-21 NOTE — LETTER
Jose Alfredo MOREL [address]  [place] [address]                December 21, 2020    Dear Jose Alfredo,    This letter is regarding your transplant lab work. The most recent laboratory results we have on file for you are from 2/1/2019. Your labs should be completed every month.   For the best outcome for your transplant and in order for us to refill your prescriptions, we encourage you to have a yearly clinic appointment with a physician and to have current labs. If you are unable to return to our transplant center there are several alternatives. Please call your coordinator to discuss them.  To make an appointment with a physician here at [facility], please call:  The Transplant Office at [phone] or [phone].     The Transplant Staff at [facility]

## 2020-12-21 NOTE — TELEPHONE ENCOUNTER
Health Call Center    Phone Message    May a detailed message be left on voicemail: yes     Reason for Call: Medication Refill Request    Has the patient contacted the pharmacy for the refill? Yes   Name of medication being requested: Cellcept 500 mg, Prograf 1 mg and Prograf 0.5 mg  Provider who prescribed the medication: Dr. Irvin  Pharmacy: [facility]  Date medication is needed: ASAP  Patient needs medications mail today per Liz , please call Liz at [phone] to advise any concerns.         Action Taken: Message routed to:  [facility] JEANNIE Gu    Travel Screening: Not Applicable

## 2020-12-22 ENCOUNTER — RESULTS ONLY (OUTPATIENT)
Dept: OTHER | Facility: CLINIC | Age: 60
End: 2020-12-22

## 2020-12-22 ENCOUNTER — TELEPHONE (OUTPATIENT)
Dept: TRANSPLANT | Facility: CLINIC | Age: 60
End: 2020-12-22

## 2020-12-22 DIAGNOSIS — Z94.83 PANCREAS TRANSPLANTED (H): ICD-10-CM

## 2020-12-22 DIAGNOSIS — Z94.83 PANCREAS REPLACED BY TRANSPLANT (H): ICD-10-CM

## 2020-12-22 DIAGNOSIS — Z79.899 OTHER LONG TERM (CURRENT) DRUG THERAPY: ICD-10-CM

## 2020-12-22 DIAGNOSIS — Z94.0 KIDNEY REPLACED BY TRANSPLANT: ICD-10-CM

## 2020-12-22 DIAGNOSIS — R79.9 ABNORMAL FINDING OF BLOOD CHEMISTRY, UNSPECIFIED: ICD-10-CM

## 2020-12-22 DIAGNOSIS — E55.9 VITAMIN D DEFICIENCY: ICD-10-CM

## 2020-12-22 DIAGNOSIS — Z94.0 KIDNEY TRANSPLANTED: Primary | ICD-10-CM

## 2020-12-22 DIAGNOSIS — I10 BENIGN ESSENTIAL HYPERTENSION: ICD-10-CM

## 2020-12-22 DIAGNOSIS — Z86.39 HISTORY OF DIABETES MELLITUS, TYPE I: ICD-10-CM

## 2020-12-22 DIAGNOSIS — Z94.0 S/P KIDNEY TRANSPLANT: ICD-10-CM

## 2020-12-22 LAB
ALBUMIN SERPL-MCNC: 3.6 G/DL (ref 3.4–5)
ALP SERPL-CCNC: 88 U/L (ref 40–150)
ALT SERPL W P-5'-P-CCNC: 17 U/L (ref 0–70)
AMYLASE SERPL-CCNC: 71 U/L (ref 30–110)
ANION GAP SERPL CALCULATED.3IONS-SCNC: 9 MMOL/L (ref 3–14)
AST SERPL W P-5'-P-CCNC: 10 U/L (ref 0–45)
BILIRUB SERPL-MCNC: 0.3 MG/DL (ref 0.2–1.3)
BUN SERPL-MCNC: 44 MG/DL (ref 7–30)
CALCIUM SERPL-MCNC: 8.6 MG/DL (ref 8.5–10.1)
CHLORIDE SERPL-SCNC: 119 MMOL/L (ref 94–109)
CHOLEST SERPL-MCNC: 161 MG/DL
CO2 SERPL-SCNC: 15 MMOL/L (ref 20–32)
CREAT SERPL-MCNC: 1.56 MG/DL (ref 0.66–1.25)
ERYTHROCYTE [DISTWIDTH] IN BLOOD BY AUTOMATED COUNT: 13.5 % (ref 10–15)
GFR SERPL CREATININE-BSD FRML MDRD: 48 ML/MIN/{1.73_M2}
GLUCOSE SERPL-MCNC: 80 MG/DL (ref 70–99)
HBA1C MFR BLD: 4.9 % (ref 0–5.6)
HCT VFR BLD AUTO: 34.3 % (ref 40–53)
HDLC SERPL-MCNC: 42 MG/DL
HGB BLD-MCNC: 10.4 G/DL (ref 13.3–17.7)
LDLC SERPL CALC-MCNC: 99 MG/DL
LIPASE SERPL-CCNC: 196 U/L (ref 73–393)
MCH RBC QN AUTO: 27.7 PG (ref 26.5–33)
MCHC RBC AUTO-ENTMCNC: 30.3 G/DL (ref 31.5–36.5)
MCV RBC AUTO: 91 FL (ref 78–100)
NONHDLC SERPL-MCNC: 119 MG/DL
PLATELET # BLD AUTO: 171 10E9/L (ref 150–450)
POTASSIUM SERPL-SCNC: 5 MMOL/L (ref 3.4–5.3)
PROT SERPL-MCNC: 6.2 G/DL (ref 6.8–8.8)
RBC # BLD AUTO: 3.76 10E12/L (ref 4.4–5.9)
SODIUM SERPL-SCNC: 144 MMOL/L (ref 133–144)
T4 FREE SERPL-MCNC: 1.08 NG/DL (ref 0.76–1.46)
TACROLIMUS BLD-MCNC: 5.8 UG/L (ref 5–15)
TME LAST DOSE: NORMAL H
TRIGL SERPL-MCNC: 101 MG/DL
TSH SERPL DL<=0.005 MIU/L-ACNC: 6.8 MU/L (ref 0.4–4)
WBC # BLD AUTO: 5.3 10E9/L (ref 4–11)

## 2020-12-22 PROCEDURE — 80061 LIPID PANEL: CPT | Performed by: PATHOLOGY

## 2020-12-22 PROCEDURE — 82150 ASSAY OF AMYLASE: CPT | Performed by: PATHOLOGY

## 2020-12-22 PROCEDURE — 86833 HLA CLASS II HIGH DEFIN QUAL: CPT | Performed by: PATHOLOGY

## 2020-12-22 PROCEDURE — 84443 ASSAY THYROID STIM HORMONE: CPT | Performed by: PATHOLOGY

## 2020-12-22 PROCEDURE — 80053 COMPREHEN METABOLIC PANEL: CPT | Performed by: PATHOLOGY

## 2020-12-22 PROCEDURE — 85027 COMPLETE CBC AUTOMATED: CPT | Performed by: PATHOLOGY

## 2020-12-22 PROCEDURE — 86832 HLA CLASS I HIGH DEFIN QUAL: CPT | Performed by: PATHOLOGY

## 2020-12-22 PROCEDURE — 82306 VITAMIN D 25 HYDROXY: CPT | Performed by: PATHOLOGY

## 2020-12-22 PROCEDURE — 84439 ASSAY OF FREE THYROXINE: CPT | Performed by: PATHOLOGY

## 2020-12-22 PROCEDURE — 87799 DETECT AGENT NOS DNA QUANT: CPT | Performed by: PATHOLOGY

## 2020-12-22 PROCEDURE — 83690 ASSAY OF LIPASE: CPT | Performed by: PATHOLOGY

## 2020-12-22 PROCEDURE — 80197 ASSAY OF TACROLIMUS: CPT | Performed by: PATHOLOGY

## 2020-12-22 PROCEDURE — 83036 HEMOGLOBIN GLYCOSYLATED A1C: CPT | Performed by: PATHOLOGY

## 2020-12-22 PROCEDURE — 36415 COLL VENOUS BLD VENIPUNCTURE: CPT | Performed by: PATHOLOGY

## 2020-12-22 RX ORDER — SODIUM BICARBONATE 650 MG/1
1950 TABLET ORAL 2 TIMES DAILY
Qty: 180 TABLET | Refills: 3 | Status: SHIPPED | OUTPATIENT
Start: 2020-12-22 | End: 2021-05-04

## 2020-12-22 NOTE — TELEPHONE ENCOUNTER
ISSUE:  Creatinine elevated 1.57  Sodium bicarb 15    PLAN:  Call and assess hydration status.  How much water is he drinking per day?  Any recent illness, diarrhea, s/s of a UTI, or medication changes?  Any increased intake of alcohol or caffeine?  Recommend increasing hydration and repeating labs within 1 week.    OUTCOME:  Called and spoke with Jose Alfredo.  He reports he was having several days of diarrhea prior to the lab draw but it has since resolved.  He also states he was not drinking enough water.  I advised he increase his fluid intake aiming for 2-3 L/day and repeat his labs next week.  He reports he is taking his sodium bicarbonate 1300 mg BID and not skipping doses.  Hemoglobin was also lower at 10.4.  Message sent to provider to advise.

## 2020-12-22 NOTE — TELEPHONE ENCOUNTER
Called and left a detailed v/m for Jose Alfredo instructing him to increase his sodium bicarb dose to 1950 mg BID.   Encouraged good hydration and to repeat labs next week.      Naif Irvin MD [place], Rebecca, FRANK             Let's increase bicarb to 1950 mg bid    Previous Messages    ----- Message -----   From: More Norman RN   Sent: 12/22/2020  12:32 PM CST   To: Naif Irvin MD   Subject: RE: bicarb                                       He said it was just a few days but has since resolved.   ----- Message -----   From: Naif Irvin MD   Sent: 12/22/2020  12:23 PM CST   To: More Norman RN   Subject: RE: bicarb                                       What is happening to the diarrhea?   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  His creatinine is elevated and his bicarb was low at 15.  He states he had several days of diarrhea prior labs and probably wasn't hydrating as well as he should.  He is taking sodium bicarb 1300 mg BID.     His hemoglobin is also lower at 10.4, was 13-14 in the past.     He is going to hydrate and repeat labs.  Do you want to make any changes to his bicarb dose or additional labs for the anemia?

## 2020-12-23 LAB
BKV DNA # SPEC NAA+PROBE: NORMAL COPIES/ML
BKV DNA SPEC NAA+PROBE-LOG#: NORMAL LOG COPIES/ML
DEPRECATED CALCIDIOL+CALCIFEROL SERPL-MC: 18 UG/L (ref 20–75)
DONOR IDENTIFICATION: NORMAL
DONOR IDENTIFICATION: NORMAL
DSA COMMENTS: NORMAL
DSA COMMENTS: NORMAL
DSA PRESENT: NO
DSA PRESENT: NO
DSA TEST METHOD: NORMAL
DSA TEST METHOD: NORMAL
ORGAN: NORMAL
ORGAN: NORMAL
SA1 CELL: NORMAL
SA1 COMMENTS: NORMAL
SA1 HI RISK ABY: NORMAL
SA1 MOD RISK ABY: NORMAL
SA1 TEST METHOD: NORMAL
SA2 CELL: NORMAL
SA2 COMMENTS: NORMAL
SA2 HI RISK ABY UA: NORMAL
SA2 MOD RISK ABY: NORMAL
SA2 TEST METHOD: NORMAL
SPECIMEN SOURCE: NORMAL
UNACCEPTABLE ANTIGEN: NORMAL
UNOS CPRA: 0

## 2021-01-12 DIAGNOSIS — Z94.0 KIDNEY REPLACED BY TRANSPLANT: ICD-10-CM

## 2021-01-12 DIAGNOSIS — Z94.83 PANCREAS REPLACED BY TRANSPLANT (H): ICD-10-CM

## 2021-01-12 RX ORDER — MYCOPHENOLATE MOFETIL 500 MG/1
TABLET, FILM COATED ORAL
Qty: 60 TABLET | Refills: 0 | Status: SHIPPED | OUTPATIENT
Start: 2021-01-12 | End: 2021-02-09

## 2021-01-12 RX ORDER — SULFAMETHOXAZOLE AND TRIMETHOPRIM 400; 80 MG/1; MG/1
TABLET ORAL
Qty: 8 TABLET | Refills: 0 | Status: SHIPPED | OUTPATIENT
Start: 2021-01-12 | End: 2021-01-28

## 2021-01-12 RX ORDER — TACROLIMUS 1 MG/1
CAPSULE, GELATIN COATED ORAL
Qty: 60 CAPSULE | Refills: 0 | Status: SHIPPED | OUTPATIENT
Start: 2021-01-12 | End: 2021-02-09

## 2021-01-12 RX ORDER — TACROLIMUS 0.5 MG/1
CAPSULE, GELATIN COATED ORAL
Qty: 60 CAPSULE | Refills: 0 | Status: SHIPPED | OUTPATIENT
Start: 2021-01-12 | End: 2021-02-09

## 2021-01-12 NOTE — TELEPHONE ENCOUNTER
[facility] Call Center    Phone Message    May a detailed message be left on voicemail: yes     Reason for Call: Medication Refill Request    Has the patient contacted the pharmacy for the refill? Yes   Name of medication being requested: CELLCEPT (BRAND) 500 MG tablet, PROGRAF (BRAND) 1 MG capsule, sulfamethoxazole-trimethoprim (BACTRIM) 400-80 MG tablet   Provider who prescribed the medication: Dr. Irvin  Pharmacy: [facility] [address]   Date medication is needed: asap         Action Taken: Message routed to:  [facility] neph    Travel Screening: Not Applicable

## 2021-01-28 ENCOUNTER — OFFICE VISIT (OUTPATIENT)
Dept: NEPHROLOGY | Facility: CLINIC | Age: 61
End: 2021-01-28
Attending: INTERNAL MEDICINE
Payer: MEDICARE

## 2021-01-28 VITALS
SYSTOLIC BLOOD PRESSURE: 113 MMHG | DIASTOLIC BLOOD PRESSURE: 74 MMHG | WEIGHT: 175.1 LBS | BODY MASS INDEX: 24.24 KG/M2 | HEART RATE: 65 BPM | OXYGEN SATURATION: 98 % | TEMPERATURE: 99.1 F

## 2021-01-28 DIAGNOSIS — Z94.0 HTN, KIDNEY TRANSPLANT RELATED: ICD-10-CM

## 2021-01-28 DIAGNOSIS — Z12.83 SKIN CANCER SCREENING: ICD-10-CM

## 2021-01-28 DIAGNOSIS — Z94.83 PANCREAS REPLACED BY TRANSPLANT (H): ICD-10-CM

## 2021-01-28 DIAGNOSIS — D84.9 IMMUNOSUPPRESSION (H): ICD-10-CM

## 2021-01-28 DIAGNOSIS — I15.1 HTN, KIDNEY TRANSPLANT RELATED: ICD-10-CM

## 2021-01-28 DIAGNOSIS — Z48.298 AFTERCARE FOLLOWING ORGAN TRANSPLANT: Primary | ICD-10-CM

## 2021-01-28 DIAGNOSIS — Z94.0 KIDNEY REPLACED BY TRANSPLANT: ICD-10-CM

## 2021-01-28 PROCEDURE — 99214 OFFICE O/P EST MOD 30 MIN: CPT | Mod: GC

## 2021-01-28 RX ORDER — SULFAMETHOXAZOLE AND TRIMETHOPRIM 400; 80 MG/1; MG/1
1 TABLET ORAL
Qty: 24 TABLET | Refills: 4 | Status: SHIPPED | OUTPATIENT
Start: 2021-01-29 | End: 2021-02-04 | Stop reason: ALTCHOICE

## 2021-01-28 RX ORDER — VITAMIN B COMPLEX
25 TABLET ORAL DAILY
Qty: 90 TABLET | Refills: 4 | Status: SHIPPED | OUTPATIENT
Start: 2021-01-28 | End: 2022-02-16

## 2021-01-28 ASSESSMENT — PAIN SCALES - GENERAL: PAINLEVEL: NO PAIN (0)

## 2021-01-28 NOTE — PROGRESS NOTES
CHRONIC TRANSPLANT NEPHROLOGY VISIT    Assessment & Plan   # DDKT (SPK): Trend up no labs before KAUR for months, not sure about the acuity of this. History of suggestive of KAUR related to pre-renal azotemia given his diarrhea in the few days prior to his labs in December (bicarb of 15) + being on lisinopril 40 mg. He has no more diarrhea, the plan to encourage PO hydration, stop lisinopril for now and repeat his BMP next Teusday, check US. DSA was negative recently. If creatinine is still an issue to proceed with biopsy.    - Baseline Creatinine:  ~ 0.8 - 1.0   - Proteinuria: Moderate (1-3 grams) but not recently checked , will recheck UPCR   - Date DSA Last Checked: Dec/2020      Latest DSA: No   - BK Viremia: Not checked recently due to time from transplant   - Kidney Tx Biopsy: No    # Pancreas Tx (SPK):    - Pancreatic Exocrine Drainage: Enteric drained     - Blood glucose: Euglycemia      On insulin: No   - HbA1c: Stable      Latest HbA1c: 4.9%   - Pancreatic enzymes: Stable   - Date DSA Last Checked: Dec/2020  Latest DSA: No   - Pancreas Tx Biopsy: No    # Immunosuppression: Tacrolimus immediate release (goal 4-6) and Mycophenolate mofetil (dose 500 mg every 12 hours)          - Continue with intensive monitoring of immunosuppression for efficacy and toxicity.          - Changes: No    # Infection Prophylaxis:   - PJP: Sulfa/TMP (Bactrim) MWF for last known eGFR, check CD-4 count to possibly discontinue bactrim.     # Hypertension: Controlled, but low at times;  Goal BP: < 130/80   - Changes: Yes - Discontinue lisinopril for now given his recent KAUR and soft BP    # Anemia in Chronic Renal Disease: Hgb: Stable, near normal      SJ: No   - Iron studies: Not checked recently    # Mineral Bone Disorder:   - Secondary renal hyperparathyroidism; PTH level: Not checked recently        On treatment: None  - Vitamin D; level: Low        On supplement: Yes will increase his dose to 2,000 international unit(s) daily    - Calcium; level: Normal        On supplement: No    # Electrolytes:   - Bicarbonate; level: Low        On supplement: Yes    # Skin Cancer Risk:    - Discussed sun protection and recommend regular follow up with Dermatology.    # Medical Compliance: Yes    # Transplant History:  Etiology of Kidney Failure: Diabetes mellitus type 1  Tx: DDKT (SPK)  Transplant: 10/29/2007 (Pancreas), 3/28/2007 (Kidney)  Donor Type: Donation after Brain Death Donor Class: Standard Criteria Donor  Significant changes in immunosuppression: None  Significant transplant-related complications: None    Transplant Office Phone Number: [phone]    Assessment and plan was discussed with the patient and he voiced his understanding and agreement.    Return visit: Return in about 4 weeks (around 2/25/2021).    Gómez Pickett MD    Chief Complaint   Mr. Tomlinson is a 60 year old here for kidney transplant, pancreas transplant and immunosuppression management.    Interval:   He recently found to have an KAUR, he states he diarrhea in the few days prior to his labs in December (bicarb of 15), he continues to take lisinopril 40 mg.  States his diarrhea resolved and currently,  has no more diarrhea. He denies any N/V/F/C or SOB. His weight is stable, he has not been using insulin and his sugar and recent Hb A1C are good.     Home BP: variable, but being measured with excertion sometimes.     Problem List   Patient Active Problem List   Diagnosis     Pancreas transplanted (H)     S/P kidney transplant     History of diabetes mellitus, type I     Hyperlipidemia     Peripheral neuropathy     PAD (peripheral artery disease) (H)     Skin exam, screening for cancer     Seborrheic keratosis     Acquired perforating dermatosis     Cellulitis     Atrial bigeminy     Kidney replaced by transplant     Pancreas replaced by transplant (H)     Benign essential hypertension     Diabetic neuropathy, type I diabetes mellitus (H)     Right foot pain     Left  foot pain     Tactile anesthesia     Personal history of diabetic foot ulcer     Right foot drop     Wrist drop, bilateral     Swelling of limb     Immunosuppression (H)       Allergies   No Known Allergies    Medications   Current Outpatient Medications   Medication Sig     amLODIPine (NORVASC) 5 MG tablet Take 1 tablet (5 mg) by mouth daily     ascorbic acid (VITAMIN C) 500 MG tablet Take 2 tablets (1,000 mg) by mouth daily     aspirin (SM ASPIRIN ADULT LOW STRENGTH) 81 MG EC tablet Take 1 tablet (81 mg) by mouth daily     CELLCEPT (BRAND) 500 MG tablet TAKE ONE TABLET BY MOUTH TWICE A DAY     ganciclovir 0.15 % GEL Use one drop in right eye 5 times each day for 7 days.     Multiple Vitamin (TAB-A-AMBER) TABS Take 1 tablet by mouth daily     multivitamin, therapeutic with minerals (MULTI-VITAMIN) TABS tablet Take 1 tablet by mouth daily     ondansetron (ZOFRAN-ODT) 4 MG ODT tab DISSOLVE ONE TABLET ON TONGUE EVERY 8 HOURS AS NEEDED FOR NAUSEA     order for DME Right carbon fiber AFO replacement due to fracture at posterior strut and due to age of brace greater than 5 years.  Patient requires AFOs for support and stability to enable safe ambulation     order for DME Equipment being ordered: therapeutic shoes and insoles. For Peripheral neuropathy, diabetes type 1 and poor circulation     PROGRAF (BRAND) 0.5 MG capsule TAKE ONE CAPSULE BY MOUTH TWICE A DAY (DAW1)     PROGRAF (BRAND) 1 MG capsule TAKE ONE CAPSULE BY MOUTH TWICE A DAY (DAW1)     sodium bicarbonate 650 MG tablet Take 3 tablets (1,950 mg) by mouth 2 times daily     [START ON 1/29/2021] sulfamethoxazole-trimethoprim (BACTRIM) 400-80 MG tablet Take 1 tablet by mouth Every Mon, Wed, Fri Morning     vitamin C (ASCORBIC ACID) 500 MG tablet Take 2 tablets (1,000 mg) by mouth daily     Vitamin D3 (CHOLECALCIFEROL) 25 mcg (1000 units) tablet Take 1 tablet (25 mcg) by mouth daily     Current Facility-Administered Medications   Medication     lidocaine 1% with  EPINEPHrine 1:100,000 injection 3 mL     Medications Discontinued During This Encounter   Medication Reason     Cholecalciferol 400 UNITS CAPS      cholecalciferol (VITAMIN D) 400 units tablet      lisinopril (PRINIVIL/ZESTRIL) 40 MG tablet      omeprazole (PRILOSEC) 20 MG CR capsule      omeprazole (PRILOSEC) 20 MG DR capsule      omeprazole (PRILOSEC) 20 MG DR capsule      omeprazole (PRILOSEC) 20 MG DR capsule      omeprazole (PRILOSEC) 20 MG DR capsule      sulfamethoxazole-trimethoprim (BACTRIM) 400-80 MG tablet Reorder     AZITHROMYCIN PO      vitamin C (ASCORBIC ACID) 500 MG tablet      cholecalciferol (VITAMIN D) 10 MCG (400 UNIT) tablet      lisinopril (ZESTRIL) 40 MG tablet        Physical Exam   Vital Signs: /74   Pulse 65   Temp 99.1  F (37.3  C)   Wt 79.4 kg (175 lb 1.6 oz)   SpO2 98%   BMI 24.24 kg/m      GENERAL APPEARANCE: alert and no distress  HENT: mouth without ulcers or lesions  LYMPHATICS: no cervical or supraclavicular nodes  RESP: lungs clear to auscultation - no rales, rhonchi or wheezes  CV: regular rhythm, normal rate, no rub, no murmur  EDEMA: no LE edema bilaterally  ABDOMEN: soft, nondistended, nontender, bowel sounds normal  MS: extremities normal - no gross deformities noted, no evidence of inflammation in joints, no muscle tenderness  SKIN: no rash  TX KIDNEY: normal      Data     Renal Latest Ref Rng & Units 12/22/2020 2/1/2019 12/14/2018   Na 133 - 144 mmol/L 144 142 140   K 3.4 - 5.3 mmol/L 5.0 4.3 4.6   Cl 94 - 109 mmol/L 119(H) 113(H) 110(H)   CO2 20 - 32 mmol/L 15(L) 20 20   BUN 7 - 30 mg/dL 44(H) 28 30   Cr 0.66 - 1.25 mg/dL 1.56(H) 0.94 0.83   Glucose 70 - 99 mg/dL 80 86 94   Ca  8.5 - 10.1 mg/dL 8.6 8.8 9.9   Mg 1.6 - 2.3 mg/dL - - -     Bone Health Latest Ref Rng & Units 12/22/2020/2020 11/14/2010 11/13/2010   Phos 2.5 - 4.5 mg/dL - 4.1 3.7   PTHi 12 - 72 pg/mL - - -   Vit D Def 20 - 75 ug/L 18(L) - -     Heme Latest Ref Rng & Units 12/22/2020 2/1/2019 12/14/2018    WBC 4.0 - 11.0 10e9/L 5.3 6.3 7.4   Hgb 13.3 - 17.7 g/dL 10.4(L) 13.8 14.1   Plt 150 - 450 10e9/L 171 168 181   ABSOLUTE NEUTROPHIL 1.6 - 8.3 10e9/L - - -   ABSOLUTE LYMPHOCYTES 0.8 - 5.3 10e9/L - - -   ABSOLUTE MONOCYTES 0.0 - 1.3 10e9/L - - -   ABSOLUTE EOSINOPHILS 0.0 - 0.7 10e9/L - - -   ABSOLUTE BASOPHILS 0.0 - 0.2 10e9/L - - -   ABS IMMATURE GRANULOCYTES 0 - 0.4 10e9/L - - -   ABSOLUTE NUCLEATED RBC - - - -     Liver Latest Ref Rng & Units 12/22/2020 6/26/2016 1/25/2016   AP 40 - 150 U/L 88 98 97   TBili 0.2 - 1.3 mg/dL 0.3 0.4 0.8   ALT 0 - 70 U/L 17 26 21   AST 0 - 45 U/L 10 19 14   Tot Protein 6.8 - 8.8 g/dL 6.2(L) 7.0 6.8   Albumin 3.4 - 5.0 g/dL 3.6 4.0 3.8     Pancreas Latest Ref Rng & Units 12/22/2020 2/1/2019 12/14/2018   A1C 0 - 5.6 % 4.9 4.9 -   Amylase 30 - 110 U/L 71 60 70   Lipase 73 - 393 U/L 196 123 221     Iron studies Latest Ref Rng & Units 1/25/2016 11/16/2007 3/29/2007   Iron 35 - 180 ug/dL 85 38 22(L)   Iron sat 15 - 46 % 27 - -   Ferritin 26 - 388 ng/mL 53 368(H) 726(H)     [facility] Txp Virology Latest Ref Rng & Units 12/22/2020 3/3/2015 11/12/2010   CMV IgG EU/mL - - -   CVM DNA Quant - - - Whole blood, EDTA anticoagulant   CMV Quant <100 Copies/mL - - <100   CMV QT Log <2.0 Log copies/mL - - <2.0   BK Spec - Plasma Plasma -   BK Res BKNEG:BK Virus DNA Not Detected copies/mL BK Virus DNA Not Detected BK Virus DNA Not Detected -   BK Log <2.7 Log copies/mL Not Calculated Not Calculated   The Real-Time quantitative BK Virus assay was developed and its performance   characteristics determined by the Infectious Diseases Diagnostic Laboratory at   the [facility] in [place], [place]. The   primers and probes for each analyte are Analyte Specific Reagents (ASRs)   manufactured by [employer].   ASRs are used in many laboratory tests necessary for standard medical care and   generally do not require U.S. Food and Drug Administration approval. The FDA   has determined  that such clearance or approval is not necessary.   This test is used for clinical purposes. It should not be regarded as   investigational or for research. This laboratory is certified under the   Clinical Laboratory Improvement Amendments of 1988 (CLIA-88) as qualified to   perform high complexity clinical laboratory testing.   -   EBV IgG - - - -   EBV DNA COPIES/ML <1000 Copies/mL - - -   EBV DNA LOG OF COPIES <3.0 Log copies/mL - - -   Hep B Core NEG - - -   Hep B Surf 0.0 - 4.9 mIU/mL - - -   HIV 1&2 NEG - - -        Recent Labs   Lab Test 02/23/18  1036 02/01/19  0959 12/22/20  0804   DOSTAC 1900 2/22/18 2/1/19 1900 12/21/20 7PM   TACROL 7.5 11.9 5.8     Recent Labs   Lab Test 12/17/12  0750 01/28/13  0600 08/26/13  0735   DOSMPA Not Provided Not Provided 08/25/13, 1900   MPACID 1.69 1.21 1.05   MPAG 23.4* 30.7 29.4*       Gómez Pickett MD on 1/28/2021 at 3:57 PM    Physician Attestation   I, Sara Lundy MD, saw this patient and agree with the findings and plan of care as documented in the note.          Sara Lundy MD

## 2021-01-28 NOTE — PATIENT INSTRUCTIONS
Hydrate your self well, push more fluids, 3 liters per day.  Stop taking lisinopril.   Repeat your blood work next Monday.  Depends on your blood work result next week, we may need additional tests.

## 2021-01-28 NOTE — NURSING NOTE
"Chief Complaint   Patient presents with     RECHECK     Follow up TX     Vital signs:  Temp: 99.1  F (37.3  C)   BP: 113/74 Pulse: 65     SpO2: 98 %       Weight: 79.4 kg (175 lb 1.6 oz)  Estimated body mass index is 24.24 kg/m  as calculated from the following:    Height as of 3/22/19: 1.81 m (5' 11.26\").    Weight as of this encounter: 79.4 kg (175 lb 1.6 oz).        Michelle Newell, ALONA    "

## 2021-01-28 NOTE — LETTER
1/28/2021       RE: Jose Alfredo Tomlinson  [address]  [place] [address]     Dear Colleague,    Thank you for referring your patient, Jose Alfredo Tomlinson, to the [facility] [place] at [facility]. Please see a copy of my visit note below.    CHRONIC TRANSPLANT NEPHROLOGY VISIT    Assessment & Plan   # DDKT (SPK): Trend up no labs before KAUR for months, not sure about the acuity of this. History of suggestive of KAUR related to pre-renal azotemia given his diarrhea in the few days prior to his labs in December (bicarb of 15) + being on lisinopril 40 mg. He has no more diarrhea, the plan to encourage PO hydration, stop lisinopril for now and repeat his BMP next Teusday, check US. DSA was negative recently. If creatinine is still an issue to proceed with biopsy.    - Baseline Creatinine:  ~ 0.8 - 1.0   - Proteinuria: Moderate (1-3 grams) but not recently checked , will recheck UPCR   - Date DSA Last Checked: Dec/2020      Latest DSA: No   - BK Viremia: Not checked recently due to time from transplant   - Kidney Tx Biopsy: No    # Pancreas Tx (SPK):    - Pancreatic Exocrine Drainage: Enteric drained     - Blood glucose: Euglycemia      On insulin: No   - HbA1c: Stable      Latest HbA1c: 4.9%   - Pancreatic enzymes: Stable   - Date DSA Last Checked: Dec/2020  Latest DSA: No   - Pancreas Tx Biopsy: No    # Immunosuppression: Tacrolimus immediate release (goal 4-6) and Mycophenolate mofetil (dose 500 mg every 12 hours)          - Continue with intensive monitoring of immunosuppression for efficacy and toxicity.          - Changes: No    # Infection Prophylaxis:   - PJP: Sulfa/TMP (Bactrim) MWF for last known eGFR, check CD-4 count to possibly discontinue bactrim.     # Hypertension: Controlled, but low at times;  Goal BP: < 130/80   - Changes: Yes - Discontinue lisinopril for now given his recent KAUR and soft BP    # Anemia in Chronic Renal  Disease: Hgb: Stable, near normal      SJ: No   - Iron studies: Not checked recently    # Mineral Bone Disorder:   - Secondary renal hyperparathyroidism; PTH level: Not checked recently        On treatment: None  - Vitamin D; level: Low        On supplement: Yes will increase his dose to 2,000 international unit(s) daily   - Calcium; level: Normal        On supplement: No    # Electrolytes:   - Bicarbonate; level: Low        On supplement: Yes    # Skin Cancer Risk:    - Discussed sun protection and recommend regular follow up with Dermatology.    # Medical Compliance: Yes    # Transplant History:  Etiology of Kidney Failure: Diabetes mellitus type 1  Tx: DDKT (SPK)  Transplant: 10/29/2007 (Pancreas), 3/28/2007 (Kidney)  Donor Type: Donation after Brain Death Donor Class: Standard Criteria Donor  Significant changes in immunosuppression: None  Significant transplant-related complications: None    Transplant Office Phone Number: [phone]    Assessment and plan was discussed with the patient and he voiced his understanding and agreement.    Return visit: Return in about 4 weeks (around 2/25/2021).    Gómez Pickett MD    Chief Complaint   Mr. Tomlinson is a 60 year old here for kidney transplant, pancreas transplant and immunosuppression management.    Interval:   He recently found to have an KAUR, he states he diarrhea in the few days prior to his labs in December (bicarb of 15), he continues to take lisinopril 40 mg.  States his diarrhea resolved and currently,  has no more diarrhea. He denies any N/V/F/C or SOB. His weight is stable, he has not been using insulin and his sugar and recent Hb A1C are good.     Home BP: variable, but being measured with excertion sometimes.     Problem List   Patient Active Problem List   Diagnosis     Pancreas transplanted (H)     S/P kidney transplant     History of diabetes mellitus, type I     Hyperlipidemia     Peripheral neuropathy     PAD (peripheral artery disease) (H)      Skin exam, screening for cancer     Seborrheic keratosis     Acquired perforating dermatosis     Cellulitis     Atrial bigeminy     Kidney replaced by transplant     Pancreas replaced by transplant (H)     Benign essential hypertension     Diabetic neuropathy, type I diabetes mellitus (H)     Right foot pain     Left foot pain     Tactile anesthesia     Personal history of diabetic foot ulcer     Right foot drop     Wrist drop, bilateral     Swelling of limb     Immunosuppression (H)       Allergies   No Known Allergies    Medications   Current Outpatient Medications   Medication Sig     amLODIPine (NORVASC) 5 MG tablet Take 1 tablet (5 mg) by mouth daily     ascorbic acid (VITAMIN C) 500 MG tablet Take 2 tablets (1,000 mg) by mouth daily     aspirin (SM ASPIRIN ADULT LOW STRENGTH) 81 MG EC tablet Take 1 tablet (81 mg) by mouth daily     CELLCEPT (BRAND) 500 MG tablet TAKE ONE TABLET BY MOUTH TWICE A DAY     ganciclovir 0.15 % GEL Use one drop in right eye 5 times each day for 7 days.     Multiple Vitamin (TAB-A-AMBER) TABS Take 1 tablet by mouth daily     multivitamin, therapeutic with minerals (MULTI-VITAMIN) TABS tablet Take 1 tablet by mouth daily     ondansetron (ZOFRAN-ODT) 4 MG ODT tab DISSOLVE ONE TABLET ON TONGUE EVERY 8 HOURS AS NEEDED FOR NAUSEA     order for DME Right carbon fiber AFO replacement due to fracture at posterior strut and due to age of brace greater than 5 years.  Patient requires AFOs for support and stability to enable safe ambulation     order for DME Equipment being ordered: therapeutic shoes and insoles. For Peripheral neuropathy, diabetes type 1 and poor circulation     PROGRAF (BRAND) 0.5 MG capsule TAKE ONE CAPSULE BY MOUTH TWICE A DAY (DAW1)     PROGRAF (BRAND) 1 MG capsule TAKE ONE CAPSULE BY MOUTH TWICE A DAY (DAW1)     sodium bicarbonate 650 MG tablet Take 3 tablets (1,950 mg) by mouth 2 times daily     [START ON 1/29/2021] sulfamethoxazole-trimethoprim (BACTRIM) 400-80 MG  tablet Take 1 tablet by mouth Every Mon, Wed, Fri Morning     vitamin C (ASCORBIC ACID) 500 MG tablet Take 2 tablets (1,000 mg) by mouth daily     Vitamin D3 (CHOLECALCIFEROL) 25 mcg (1000 units) tablet Take 1 tablet (25 mcg) by mouth daily     Current Facility-Administered Medications   Medication     lidocaine 1% with EPINEPHrine 1:100,000 injection 3 mL     Medications Discontinued During This Encounter   Medication Reason     Cholecalciferol 400 UNITS CAPS      cholecalciferol (VITAMIN D) 400 units tablet      lisinopril (PRINIVIL/ZESTRIL) 40 MG tablet      omeprazole (PRILOSEC) 20 MG CR capsule      omeprazole (PRILOSEC) 20 MG DR capsule      omeprazole (PRILOSEC) 20 MG DR capsule      omeprazole (PRILOSEC) 20 MG DR capsule      omeprazole (PRILOSEC) 20 MG DR capsule      sulfamethoxazole-trimethoprim (BACTRIM) 400-80 MG tablet Reorder     AZITHROMYCIN PO      vitamin C (ASCORBIC ACID) 500 MG tablet      cholecalciferol (VITAMIN D) 10 MCG (400 UNIT) tablet      lisinopril (ZESTRIL) 40 MG tablet        Physical Exam   Vital Signs: /74   Pulse 65   Temp 99.1  F (37.3  C)   Wt 79.4 kg (175 lb 1.6 oz)   SpO2 98%   BMI 24.24 kg/m      GENERAL APPEARANCE: alert and no distress  HENT: mouth without ulcers or lesions  LYMPHATICS: no cervical or supraclavicular nodes  RESP: lungs clear to auscultation - no rales, rhonchi or wheezes  CV: regular rhythm, normal rate, no rub, no murmur  EDEMA: no LE edema bilaterally  ABDOMEN: soft, nondistended, nontender, bowel sounds normal  MS: extremities normal - no gross deformities noted, no evidence of inflammation in joints, no muscle tenderness  SKIN: no rash  TX KIDNEY: normal      Data     Renal Latest Ref Rng & Units 12/22/2020 2/1/2019 12/14/2018   Na 133 - 144 mmol/L 144 142 140   K 3.4 - 5.3 mmol/L 5.0 4.3 4.6   Cl 94 - 109 mmol/L 119(H) 113(H) 110(H)   CO2 20 - 32 mmol/L 15(L) 20 20   BUN 7 - 30 mg/dL 44(H) 28 30   Cr 0.66 - 1.25 mg/dL 1.56(H) 0.94 0.83    Glucose 70 - 99 mg/dL 80 86 94   Ca  8.5 - 10.1 mg/dL 8.6 8.8 9.9   Mg 1.6 - 2.3 mg/dL - - -     Bone Health Latest Ref Rng & Units 12/22/2020 11/14/2010 11/13/2010   Phos 2.5 - 4.5 mg/dL - 4.1 3.7   PTHi 12 - 72 pg/mL - - -   Vit D Def 20 - 75 ug/L 18(L) - -     Heme Latest Ref Rng & Units 12/22/2020 2/1/2019 12/14/2018   WBC 4.0 - 11.0 10e9/L 5.3 6.3 7.4   Hgb 13.3 - 17.7 g/dL 10.4(L) 13.8 14.1   Plt 150 - 450 10e9/L 171 168 181   ABSOLUTE NEUTROPHIL 1.6 - 8.3 10e9/L - - -   ABSOLUTE LYMPHOCYTES 0.8 - 5.3 10e9/L - - -   ABSOLUTE MONOCYTES 0.0 - 1.3 10e9/L - - -   ABSOLUTE EOSINOPHILS 0.0 - 0.7 10e9/L - - -   ABSOLUTE BASOPHILS 0.0 - 0.2 10e9/L - - -   ABS IMMATURE GRANULOCYTES 0 - 0.4 10e9/L - - -   ABSOLUTE NUCLEATED RBC - - - -     Liver Latest Ref Rng & Units 12/22/2020 6/26/2016 1/25/2016   AP 40 - 150 U/L 88 98 97   TBili 0.2 - 1.3 mg/dL 0.3 0.4 0.8   ALT 0 - 70 U/L 17 26 21   AST 0 - 45 U/L 10 19 14   Tot Protein 6.8 - 8.8 g/dL 6.2(L) 7.0 6.8   Albumin 3.4 - 5.0 g/dL 3.6 4.0 3.8     Pancreas Latest Ref Rng & Units 12/22/2020 2/1/2019 12/14/2018   A1C 0 - 5.6 % 4.9 4.9 -   Amylase 30 - 110 U/L 71 60 70   Lipase 73 - 393 U/L 196 123 221     Iron studies Latest Ref Rng & Units 1/25/2016 11/16/2007 3/29/2007   Iron 35 - 180 ug/dL 85 38 22(L)   Iron sat 15 - 46 % 27 - -   Ferritin 26 - 388 ng/mL 53 368(H) 726(H)     [facility] Txp Virology Latest Ref Rng & Units 12/22/2020 3/3/2015 11/12/2010   CMV IgG EU/mL - - -   CVM DNA Quant - - - Whole blood, EDTA anticoagulant   CMV Quant <100 Copies/mL - - <100   CMV QT Log <2.0 Log copies/mL - - <2.0   BK Spec - Plasma Plasma -   BK Res BKNEG:BK Virus DNA Not Detected copies/mL BK Virus DNA Not Detected BK Virus DNA Not Detected -   BK Log <2.7 Log copies/mL Not Calculated Not Calculated   The Real-Time quantitative BK Virus assay was developed and its performance   characteristics determined by the Infectious Diseases Diagnostic Laboratory at   the [facility] in [place], [place]. The   primers and probes for each analyte are Analyte Specific Reagents (ASRs)   manufactured by [employer].   ASRs are used in many laboratory tests necessary for standard medical care and   generally do not require U.S. Food and Drug Administration approval. The FDA   has determined that such clearance or approval is not necessary.   This test is used for clinical purposes. It should not be regarded as   investigational or for research. This laboratory is certified under the   Clinical Laboratory Improvement Amendments of 1988 (CLIA-88) as qualified to   perform high complexity clinical laboratory testing.   -   EBV IgG - - - -   EBV DNA COPIES/ML <1000 Copies/mL - - -   EBV DNA LOG OF COPIES <3.0 Log copies/mL - - -   Hep B Core NEG - - -   Hep B Surf 0.0 - 4.9 mIU/mL - - -   HIV 1&2 NEG - - -        Recent Labs   Lab Test 02/23/18  1036 02/01/19  0959 12/22/20  0804   DOSTAC 1900 2/22/18 2/1/19 1900 12/21/20 7PM   TACROL 7.5 11.9 5.8     Recent Labs   Lab Test 12/17/12  0750 01/28/13  0600 08/26/13  0735   DOSMPA Not Provided Not Provided 08/25/13, 1900   MPACID 1.69 1.21 1.05   MPAG 23.4* 30.7 29.4*       Gómez Pickett MD on 1/28/2021 at 3:57 PM    Physician Attestation   I, Sara Lundy MD, saw this patient and agree with the findings and plan of care as documented in the note.          Sara Lundy MD      Again, thank you for allowing me to participate in the care of your patient.      Sincerely,    Kidney/Pancreas Recipient

## 2021-01-29 ENCOUNTER — TELEPHONE (OUTPATIENT)
Dept: TRANSPLANT | Facility: CLINIC | Age: 61
End: 2021-01-29

## 2021-01-29 ENCOUNTER — TELEPHONE (OUTPATIENT)
Dept: NEPHROLOGY | Facility: CLINIC | Age: 61
End: 2021-01-29

## 2021-01-29 DIAGNOSIS — Z94.0 KIDNEY TRANSPLANTED: Primary | ICD-10-CM

## 2021-01-29 NOTE — TELEPHONE ENCOUNTER
Orders placed.   Message sent to scheduling to call and schedule labs and renal transplant US.     Gómez Pickett MD [place], Rebecca, FRANK   Cc: Sara Lundy MD             Hi,     We saw him in the clinic today. With his recent KAUR of unknown acuity ,  we told him to push fluids, stopped his lisinopril for now and repeat his BMP next Tuesday , check US.  If creatinine is still an issue to proceed with biopsy. Also check his CD-4 count. Can discontinue bactrim if  > 200.

## 2021-02-01 ENCOUNTER — TELEPHONE (OUTPATIENT)
Dept: TRANSPLANT | Facility: CLINIC | Age: 61
End: 2021-02-01

## 2021-02-01 NOTE — TELEPHONE ENCOUNTER
Spoke with the patient and confirmed labs and US appointments on 2/2/21.  Informed patient an itinerary can be accessed on [place].

## 2021-02-02 ENCOUNTER — ANCILLARY PROCEDURE (OUTPATIENT)
Dept: ULTRASOUND IMAGING | Facility: CLINIC | Age: 61
End: 2021-02-02
Attending: INTERNAL MEDICINE
Payer: MEDICARE

## 2021-02-02 DIAGNOSIS — Z94.0 KIDNEY TRANSPLANTED: ICD-10-CM

## 2021-02-02 DIAGNOSIS — Z94.83 PANCREAS REPLACED BY TRANSPLANT (H): ICD-10-CM

## 2021-02-02 DIAGNOSIS — Z94.0 KIDNEY REPLACED BY TRANSPLANT: ICD-10-CM

## 2021-02-02 LAB
AMYLASE SERPL-CCNC: 62 U/L (ref 30–110)
ANION GAP SERPL CALCULATED.3IONS-SCNC: 7 MMOL/L (ref 3–14)
BUN SERPL-MCNC: 33 MG/DL (ref 7–30)
CALCIUM SERPL-MCNC: 8.4 MG/DL (ref 8.5–10.1)
CD3 CELLS # BLD: 626 CELLS/UL (ref 603–2990)
CD3 CELLS NFR BLD: 62 % (ref 49–84)
CD3+CD4+ CELLS # BLD: 420 CELLS/UL (ref 441–2156)
CD3+CD4+ CELLS NFR BLD: 42 % (ref 28–63)
CD3+CD4+ CELLS/CD3+CD8+ CLL BLD: 2.33 % (ref 1.4–2.6)
CD3+CD8+ CELLS # BLD: 185 CELLS/UL (ref 125–1312)
CD3+CD8+ CELLS NFR BLD: 18 % (ref 10–40)
CHLORIDE SERPL-SCNC: 118 MMOL/L (ref 94–109)
CO2 SERPL-SCNC: 18 MMOL/L (ref 20–32)
CREAT SERPL-MCNC: 1.07 MG/DL (ref 0.66–1.25)
ERYTHROCYTE [DISTWIDTH] IN BLOOD BY AUTOMATED COUNT: 13.8 % (ref 10–15)
GFR SERPL CREATININE-BSD FRML MDRD: 76 ML/MIN/{1.73_M2}
GLUCOSE SERPL-MCNC: 94 MG/DL (ref 70–99)
HCT VFR BLD AUTO: 34 % (ref 40–53)
HGB BLD-MCNC: 10.3 G/DL (ref 13.3–17.7)
IFC SPECIMEN: ABNORMAL
LIPASE SERPL-CCNC: 135 U/L (ref 73–393)
MCH RBC QN AUTO: 28.4 PG (ref 26.5–33)
MCHC RBC AUTO-ENTMCNC: 30.3 G/DL (ref 31.5–36.5)
MCV RBC AUTO: 94 FL (ref 78–100)
PLATELET # BLD AUTO: 163 10E9/L (ref 150–450)
POTASSIUM SERPL-SCNC: 5.2 MMOL/L (ref 3.4–5.3)
RBC # BLD AUTO: 3.63 10E12/L (ref 4.4–5.9)
SODIUM SERPL-SCNC: 144 MMOL/L (ref 133–144)
TACROLIMUS BLD-MCNC: 5 UG/L (ref 5–15)
TME LAST DOSE: 2000 H
WBC # BLD AUTO: 5.8 10E9/L (ref 4–11)

## 2021-02-02 PROCEDURE — 76776 US EXAM K TRANSPL W/DOPPLER: CPT | Mod: GC | Performed by: RADIOLOGY

## 2021-02-02 PROCEDURE — 86359 T CELLS TOTAL COUNT: CPT | Performed by: INTERNAL MEDICINE

## 2021-02-02 PROCEDURE — 36415 COLL VENOUS BLD VENIPUNCTURE: CPT | Performed by: PATHOLOGY

## 2021-02-02 PROCEDURE — 80197 ASSAY OF TACROLIMUS: CPT | Performed by: INTERNAL MEDICINE

## 2021-02-02 PROCEDURE — 82150 ASSAY OF AMYLASE: CPT | Performed by: PATHOLOGY

## 2021-02-02 PROCEDURE — 83690 ASSAY OF LIPASE: CPT | Performed by: PATHOLOGY

## 2021-02-02 PROCEDURE — 85027 COMPLETE CBC AUTOMATED: CPT | Performed by: PATHOLOGY

## 2021-02-02 PROCEDURE — 80048 BASIC METABOLIC PNL TOTAL CA: CPT | Performed by: PATHOLOGY

## 2021-02-02 PROCEDURE — 86360 T CELL ABSOLUTE COUNT/RATIO: CPT | Performed by: INTERNAL MEDICINE

## 2021-02-04 ENCOUNTER — TELEPHONE (OUTPATIENT)
Dept: TRANSPLANT | Facility: CLINIC | Age: 61
End: 2021-02-04

## 2021-02-04 NOTE — TELEPHONE ENCOUNTER
ISSUE: Absolute CD4 count 420 on 2/2/21.    PLAN: Sent to Provider to review.  Message  Received: Yesterday  Message Contents   Sara Lundy MD Blaisdell, Christin Rebecca, FRANK             Ok to stop bactrim    Previous Messages    ----- Message -----   From: Romy Scherer, FRANK   Sent: 2/2/2021   1:43 PM CST   To: Sara Lundy MD     CD4 Count >200; sent for review to Dr. Lundy         OUTCOME:   Jose Alfredo verbalized understanding to stop Bactrim and states he will call the pharmacy to cancel his delivery of this medication. Bactrim was discontinued from med list.

## 2021-02-05 ENCOUNTER — TELEPHONE (OUTPATIENT)
Dept: TRANSPLANT | Facility: CLINIC | Age: 61
End: 2021-02-05

## 2021-02-05 DIAGNOSIS — Z94.83 PANCREAS TRANSPLANTED (H): ICD-10-CM

## 2021-02-05 DIAGNOSIS — Z94.0 KIDNEY TRANSPLANTED: Primary | ICD-10-CM

## 2021-02-05 NOTE — TELEPHONE ENCOUNTER
PLAN:  Repeat labs in 2 weeks   Monitor BP for 2 weeks   Will consider restarting lisinopril   Follow up renal US in 6 months.     OUTCOME:  Spoke with Jose Alfredo.  Asked that he monitor his BP and repeat his labs in a couple of weeks.   Also discussed repeating his US in 6 months.   Order placed, message sent to scheduling.        From: Sara Lundy MD   Sent: 2/3/2021   9:21 AM CST   To: Carol Ambrose RN     Could f/up in 4-6 weeks   Needs to f/up home BP readings x 2 weeks since we stopped his lisinopril & repeat labs in 2 weeks, if stable & Bp>130/80 will consider resuming lisinopril     From: Sara Lundy MD   Sent: 2/5/2021   6:47 AM CST   To: Sapphire Mckeon RN     Schedule f/up U/S in 6 months to f/up on this 0.9 cm hypoechoic focus

## 2021-02-09 DIAGNOSIS — Z94.83 PANCREAS REPLACED BY TRANSPLANT (H): Primary | ICD-10-CM

## 2021-02-09 DIAGNOSIS — Z94.0 KIDNEY REPLACED BY TRANSPLANT: ICD-10-CM

## 2021-02-09 RX ORDER — TACROLIMUS 0.5 MG/1
0.5 CAPSULE, GELATIN COATED ORAL 2 TIMES DAILY
Qty: 60 CAPSULE | Refills: 3 | Status: SHIPPED | OUTPATIENT
Start: 2021-02-09 | End: 2021-02-15

## 2021-02-09 RX ORDER — MYCOPHENOLATE MOFETIL 500 MG/1
500 TABLET, FILM COATED ORAL 2 TIMES DAILY
Qty: 60 TABLET | Refills: 3 | Status: SHIPPED | OUTPATIENT
Start: 2021-02-09 | End: 2021-06-03

## 2021-02-09 RX ORDER — TACROLIMUS 1 MG/1
1 CAPSULE, GELATIN COATED ORAL 2 TIMES DAILY
Qty: 60 CAPSULE | Refills: 3 | Status: SHIPPED | OUTPATIENT
Start: 2021-02-09 | End: 2021-02-15

## 2021-02-10 ENCOUNTER — TELEPHONE (OUTPATIENT)
Dept: TRANSPLANT | Facility: CLINIC | Age: 61
End: 2021-02-10

## 2021-02-10 NOTE — TELEPHONE ENCOUNTER
February 10, 2021 1:52 PM -  AIVERSE1: called pt to [facility] josette up test gilson per cord

## 2021-02-15 DIAGNOSIS — Z94.83 PANCREAS REPLACED BY TRANSPLANT (H): Primary | ICD-10-CM

## 2021-02-15 DIAGNOSIS — Z94.0 KIDNEY REPLACED BY TRANSPLANT: ICD-10-CM

## 2021-02-15 RX ORDER — TACROLIMUS 0.5 MG/1
0.5 CAPSULE, GELATIN COATED ORAL 2 TIMES DAILY
Qty: 60 CAPSULE | Refills: 11 | Status: SHIPPED | OUTPATIENT
Start: 2021-02-15 | End: 2022-01-18

## 2021-02-15 RX ORDER — TACROLIMUS 1 MG/1
1 CAPSULE, GELATIN COATED ORAL 2 TIMES DAILY
Qty: 60 CAPSULE | Refills: 11 | Status: SHIPPED | OUTPATIENT
Start: 2021-02-15 | End: 2022-01-18

## 2021-02-23 DIAGNOSIS — Z94.83 PANCREAS REPLACED BY TRANSPLANT (H): ICD-10-CM

## 2021-02-23 DIAGNOSIS — Z94.0 KIDNEY TRANSPLANTED: ICD-10-CM

## 2021-02-23 DIAGNOSIS — Z94.83 PANCREAS TRANSPLANTED (H): ICD-10-CM

## 2021-02-23 DIAGNOSIS — Z94.0 KIDNEY REPLACED BY TRANSPLANT: ICD-10-CM

## 2021-02-23 LAB
AMYLASE SERPL-CCNC: 56 U/L (ref 30–110)
ANION GAP SERPL CALCULATED.3IONS-SCNC: 7 MMOL/L (ref 3–14)
BUN SERPL-MCNC: 20 MG/DL (ref 7–30)
CALCIUM SERPL-MCNC: 8.8 MG/DL (ref 8.5–10.1)
CHLORIDE SERPL-SCNC: 117 MMOL/L (ref 94–109)
CO2 SERPL-SCNC: 19 MMOL/L (ref 20–32)
CREAT SERPL-MCNC: 0.9 MG/DL (ref 0.66–1.25)
ERYTHROCYTE [DISTWIDTH] IN BLOOD BY AUTOMATED COUNT: 13.5 % (ref 10–15)
GFR SERPL CREATININE-BSD FRML MDRD: >90 ML/MIN/{1.73_M2}
GLUCOSE SERPL-MCNC: 90 MG/DL (ref 70–99)
HCT VFR BLD AUTO: 37.2 % (ref 40–53)
HGB BLD-MCNC: 11.5 G/DL (ref 13.3–17.7)
LIPASE SERPL-CCNC: 86 U/L (ref 73–393)
MCH RBC QN AUTO: 28 PG (ref 26.5–33)
MCHC RBC AUTO-ENTMCNC: 30.9 G/DL (ref 31.5–36.5)
MCV RBC AUTO: 91 FL (ref 78–100)
PLATELET # BLD AUTO: 186 10E9/L (ref 150–450)
POTASSIUM SERPL-SCNC: 4.6 MMOL/L (ref 3.4–5.3)
RBC # BLD AUTO: 4.11 10E12/L (ref 4.4–5.9)
SODIUM SERPL-SCNC: 143 MMOL/L (ref 133–144)
TACROLIMUS BLD-MCNC: 4.3 UG/L (ref 5–15)
TME LAST DOSE: ABNORMAL H
WBC # BLD AUTO: 5.8 10E9/L (ref 4–11)

## 2021-02-23 PROCEDURE — 85027 COMPLETE CBC AUTOMATED: CPT | Performed by: PATHOLOGY

## 2021-02-23 PROCEDURE — 82150 ASSAY OF AMYLASE: CPT | Performed by: PATHOLOGY

## 2021-02-23 PROCEDURE — 83690 ASSAY OF LIPASE: CPT | Performed by: PATHOLOGY

## 2021-02-23 PROCEDURE — 80048 BASIC METABOLIC PNL TOTAL CA: CPT | Performed by: PATHOLOGY

## 2021-02-23 PROCEDURE — 80197 ASSAY OF TACROLIMUS: CPT | Performed by: INTERNAL MEDICINE

## 2021-02-23 PROCEDURE — 36415 COLL VENOUS BLD VENIPUNCTURE: CPT | Performed by: PATHOLOGY

## 2021-02-24 ENCOUNTER — TELEPHONE (OUTPATIENT)
Dept: TRANSPLANT | Facility: CLINIC | Age: 61
End: 2021-02-24

## 2021-02-24 DIAGNOSIS — Z94.0 KIDNEY TRANSPLANTED: Primary | ICD-10-CM

## 2021-02-24 NOTE — TELEPHONE ENCOUNTER
ISSUE:  Tacrolimus 4.3, goal 5-8  Appears to be a long trough     PLAN:  Call and confirm a 12 hour trough and current tacrolimus does of 1.5 mg BID  Any recent illness, medication changes, or missed doses?  Prior levels at goal.  Recommend repeating in 1-2 weeks with an accurate 12 hour trough     LPN TASK:  Call with above instruction   Update lab order

## 2021-03-06 DIAGNOSIS — Z94.0 KIDNEY REPLACED BY TRANSPLANT: ICD-10-CM

## 2021-03-06 DIAGNOSIS — Z94.0 KIDNEY TRANSPLANTED: ICD-10-CM

## 2021-03-06 DIAGNOSIS — Z94.83 PANCREAS REPLACED BY TRANSPLANT (H): ICD-10-CM

## 2021-03-06 LAB
AMYLASE SERPL-CCNC: 59 U/L (ref 30–110)
ANION GAP SERPL CALCULATED.3IONS-SCNC: 6 MMOL/L (ref 3–14)
BUN SERPL-MCNC: 31 MG/DL (ref 7–30)
CALCIUM SERPL-MCNC: 8.3 MG/DL (ref 8.5–10.1)
CHLORIDE SERPL-SCNC: 116 MMOL/L (ref 94–109)
CO2 SERPL-SCNC: 21 MMOL/L (ref 20–32)
CREAT SERPL-MCNC: 0.92 MG/DL (ref 0.66–1.25)
GFR SERPL CREATININE-BSD FRML MDRD: >90 ML/MIN/{1.73_M2}
GLUCOSE SERPL-MCNC: 92 MG/DL (ref 70–99)
LIPASE SERPL-CCNC: 124 U/L (ref 73–393)
POTASSIUM SERPL-SCNC: 5.1 MMOL/L (ref 3.4–5.3)
SODIUM SERPL-SCNC: 143 MMOL/L (ref 133–144)
TACROLIMUS BLD-MCNC: 4.9 UG/L (ref 5–15)
TME LAST DOSE: ABNORMAL H

## 2021-03-06 PROCEDURE — 82150 ASSAY OF AMYLASE: CPT | Performed by: PATHOLOGY

## 2021-03-06 PROCEDURE — 36415 COLL VENOUS BLD VENIPUNCTURE: CPT | Performed by: PATHOLOGY

## 2021-03-06 PROCEDURE — 83690 ASSAY OF LIPASE: CPT | Performed by: PATHOLOGY

## 2021-03-06 PROCEDURE — 80197 ASSAY OF TACROLIMUS: CPT | Performed by: INTERNAL MEDICINE

## 2021-03-06 PROCEDURE — 80048 BASIC METABOLIC PNL TOTAL CA: CPT | Performed by: PATHOLOGY

## 2021-03-11 DIAGNOSIS — K21.9 ESOPHAGEAL REFLUX: Primary | ICD-10-CM

## 2021-03-11 DIAGNOSIS — R11.15 NON-INTRACTABLE CYCLICAL VOMITING WITH NAUSEA: ICD-10-CM

## 2021-03-15 RX ORDER — ONDANSETRON 4 MG/1
TABLET, ORALLY DISINTEGRATING ORAL
Qty: 10 TABLET | Refills: 2 | Status: SHIPPED | OUTPATIENT
Start: 2021-03-15 | End: 2021-07-01

## 2021-03-15 NOTE — CONFIDENTIAL NOTE
omeprazole (PRILOSEC) 20 MG DR capsule    Last Written Prescription Date: 8/8/20  Last Fill Quantity: 180,   # refills: 2  Last Office Visit : 5/15/20  Future Office visit:  None      ondansetron (ZOFRAN-ODT) 4 MG ODT tab  Last Written Prescription Date:  12/17/20  Last Fill Quantity: 10,   # refills: 2      Routing refill request to provider for review/approval because: med end date  1/28/21

## 2021-04-07 DIAGNOSIS — Z94.0 S/P KIDNEY TRANSPLANT: ICD-10-CM

## 2021-04-11 NOTE — CONFIDENTIAL NOTE
5/15/2020  [facility]   Rosio Salas MD  Internal Medicine      aspirin (SM ASPIRIN ADULT LOW STRENGTH) 81 MG EC tablet       prescrip was faxed to pharmacy,     spoke with pt informed her Dr. West does want to see her on 12/5/18 as scheduled    She didn't need a PA as she has enough for this period of time    Shelli Live RN   [facility]

## 2021-04-25 ENCOUNTER — MYC MEDICAL ADVICE (OUTPATIENT)
Dept: INTERNAL MEDICINE | Facility: CLINIC | Age: 61
End: 2021-04-25

## 2021-04-25 DIAGNOSIS — H02.402 PTOSIS OF EYELID, LEFT: Primary | ICD-10-CM

## 2021-04-27 ENCOUNTER — TELEPHONE (OUTPATIENT)
Dept: OPHTHALMOLOGY | Facility: CLINIC | Age: 61
End: 2021-04-27

## 2021-04-27 NOTE — TELEPHONE ENCOUNTER
LVM regarding referral for Ptosis of eyelid, left. - from Rosio Salas MD. Provided number for scheduling as a RETURN PATIENT for  Dr. Jose.

## 2021-05-04 DIAGNOSIS — Z94.83 PANCREAS REPLACED BY TRANSPLANT (H): ICD-10-CM

## 2021-05-04 DIAGNOSIS — Z94.0 S/P KIDNEY TRANSPLANT: ICD-10-CM

## 2021-05-04 DIAGNOSIS — Z94.83 PANCREAS TRANSPLANTED (H): ICD-10-CM

## 2021-05-04 DIAGNOSIS — Z94.0 KIDNEY REPLACED BY TRANSPLANT: ICD-10-CM

## 2021-05-04 DIAGNOSIS — G62.9 PERIPHERAL NEUROPATHY: ICD-10-CM

## 2021-05-04 RX ORDER — SODIUM BICARBONATE 650 MG/1
TABLET ORAL
Qty: 180 TABLET | Refills: 3 | Status: SHIPPED | OUTPATIENT
Start: 2021-05-04 | End: 2021-09-02

## 2021-05-07 RX ORDER — ASCORBIC ACID 500 MG
TABLET ORAL
Qty: 180 TABLET | Refills: 1 | Status: SHIPPED | OUTPATIENT
Start: 2021-05-07 | End: 2021-11-02

## 2021-05-07 NOTE — TELEPHONE ENCOUNTER
ASCORBIC ACID 500MG TABS  Please check with patient. Still taking?  Thank-you, Rosio MOREL RN Medication Refill Team

## 2021-05-24 ENCOUNTER — RECORDS - HEALTHEAST (OUTPATIENT)
Dept: ADMINISTRATIVE | Facility: CLINIC | Age: 61
End: 2021-05-24

## 2021-05-25 ENCOUNTER — RECORDS - HEALTHEAST (OUTPATIENT)
Dept: ADMINISTRATIVE | Facility: CLINIC | Age: 61
End: 2021-05-25

## 2021-05-27 ENCOUNTER — RECORDS - HEALTHEAST (OUTPATIENT)
Dept: ADMINISTRATIVE | Facility: CLINIC | Age: 61
End: 2021-05-27

## 2021-06-03 DIAGNOSIS — Z94.0 KIDNEY REPLACED BY TRANSPLANT: ICD-10-CM

## 2021-06-03 DIAGNOSIS — Z94.83 PANCREAS REPLACED BY TRANSPLANT (H): Primary | ICD-10-CM

## 2021-06-03 RX ORDER — MYCOPHENOLATE MOFETIL 500 MG/1
500 TABLET, FILM COATED ORAL 2 TIMES DAILY
Qty: 60 TABLET | Refills: 1 | Status: SHIPPED | OUTPATIENT
Start: 2021-06-03 | End: 2021-08-03

## 2021-06-14 ENCOUNTER — MYC MEDICAL ADVICE (OUTPATIENT)
Dept: INTERNAL MEDICINE | Facility: CLINIC | Age: 61
End: 2021-06-14

## 2021-06-14 ENCOUNTER — TELEPHONE (OUTPATIENT)
Dept: INTERNAL MEDICINE | Facility: CLINIC | Age: 61
End: 2021-06-14

## 2021-06-14 ENCOUNTER — VIRTUAL VISIT (OUTPATIENT)
Dept: INTERNAL MEDICINE | Facility: CLINIC | Age: 61
End: 2021-06-14
Payer: MEDICARE

## 2021-06-14 DIAGNOSIS — E10.40 DIABETIC NEUROPATHY, TYPE I DIABETES MELLITUS (H): Primary | ICD-10-CM

## 2021-06-14 DIAGNOSIS — M21.371 RIGHT FOOT DROP: Primary | ICD-10-CM

## 2021-06-14 DIAGNOSIS — Z53.9 ERRONEOUS ENCOUNTER--DISREGARD: ICD-10-CM

## 2021-06-14 DIAGNOSIS — M21.372 LEFT FOOT DROP: ICD-10-CM

## 2021-06-14 DIAGNOSIS — M21.371 RIGHT FOOT DROP: ICD-10-CM

## 2021-06-14 NOTE — PROGRESS NOTES
Tried reaching patient by video and phone x 3 until 2:56 w/o success.  LM for patient to reschedule.    NO SHOW.    Dr. Joseph  This encounter was opened in error. Please disregard.

## 2021-06-14 NOTE — TELEPHONE ENCOUNTER
Health Call Center    Phone Message    May a detailed message be left on voicemail: yes     Reason for Call: Other: Pt of Dr. Joseph calling in stating he is going to upload a picture to his My Chart for the care team.  Pt stated he needs new AFO's for his shoes but needs the  to sign off on them .      Please call pt to discuss this with him, he is asking that this get done as soon as possible please    Action Taken: Message routed to:  [facility]    Travel Screening: Not Applicable

## 2021-06-15 NOTE — TELEPHONE ENCOUNTER
Patient no show his video appt with Dr. Joseph on 06/14/21 at 2:30 PM.  Provider tried reaching patient by video and phone x 3 until 2:56 w/o success.  No DME order for AFO placed.  Patient needs a visit for this. [employer] message sent.      Cesario Sy CMA ([facility]) at 11:27 AM on 6/15/2021

## 2021-06-15 NOTE — TELEPHONE ENCOUNTER
[facility] Call Center    Phone Message    May a detailed message be left on voicemail: yes    Reason for Call: Other: .  Patient has declined an appointment when offered. He is asking that we act on this request without an appointment.    Action Taken: Message routed to:  [facility] primary care clinic    Travel Screening: Not Applicable

## 2021-06-16 ENCOUNTER — MEDICAL CORRESPONDENCE (OUTPATIENT)
Dept: HEALTH INFORMATION MANAGEMENT | Facility: CLINIC | Age: 61
End: 2021-06-16

## 2021-06-16 NOTE — TELEPHONE ENCOUNTER
Discussion/Summary  Meghan  called re: what low end tital values to be concerned with. DR. Mobley said if she is consistently falling below 25 she would like to be called. Spot check when asleep. Meghan can be reached at [phone]. LB      Signatures   Electronically signed by : Betty Acosta R.N.; Oct 23 2017 11:21AM CST     Patient called to check on message. Patient was not a no show, he is arrived and checked in.  Patient states that he was at the appointment on 6/14/21 and was checked on by rooming staff. Patient was on the video call and did not answer the calls from the provider because he didn't realize that it was her calling and didn't answer because he didn't want to lose the video. Spoke with Soon-Mi and she will escalate in clinic.

## 2021-06-17 ENCOUNTER — MEDICAL CORRESPONDENCE (OUTPATIENT)
Dept: HEALTH INFORMATION MANAGEMENT | Facility: CLINIC | Age: 61
End: 2021-06-17

## 2021-06-17 NOTE — TELEPHONE ENCOUNTER
Patient calling requesting a 5 min video appointment with his provider to get clinical notes so he can provide that to his orthodic team to he can get a new brace, DME order is not enough they state they need clinic notes. Please call to discuss thank you.

## 2021-06-17 NOTE — TELEPHONE ENCOUNTER
Patient called upset.  Patient report his was on the video (doximity) for over 30 minutes waiting.  Provider called him three times however he did not  the call as afraid will lose the video link.  Dr. Salas was reached out for the DME order.     DME order approved and signed by Dr. Salas.  Faxed to Prosthetist and Orthotist at [phone] today.  Called and notify patient of above.  Informed patient any request from a different provider other than PCP will require a visit.  DME order request from Dr. Joseph without an appointment will be declined.       Cesario Sy CMA ([facility]) at 10:52 AM on 6/17/2021

## 2021-06-21 ENCOUNTER — TELEPHONE (OUTPATIENT)
Dept: INTERNAL MEDICINE | Facility: CLINIC | Age: 61
End: 2021-06-21

## 2021-06-21 ENCOUNTER — VIRTUAL VISIT (OUTPATIENT)
Dept: INTERNAL MEDICINE | Facility: CLINIC | Age: 61
End: 2021-06-21
Payer: MEDICARE

## 2021-06-21 DIAGNOSIS — M21.372 LEFT FOOT DROP: ICD-10-CM

## 2021-06-21 DIAGNOSIS — M21.371 RIGHT FOOT DROP: ICD-10-CM

## 2021-06-21 DIAGNOSIS — E10.40 DIABETIC NEUROPATHY, TYPE I DIABETES MELLITUS (H): Primary | ICD-10-CM

## 2021-06-21 DIAGNOSIS — Z94.0 S/P KIDNEY TRANSPLANT: ICD-10-CM

## 2021-06-21 DIAGNOSIS — Z94.83 PANCREAS TRANSPLANTED (H): ICD-10-CM

## 2021-06-21 PROCEDURE — 99213 OFFICE O/P EST LOW 20 MIN: CPT | Mod: 95 | Performed by: INTERNAL MEDICINE

## 2021-06-21 NOTE — TELEPHONE ENCOUNTER
Patient needs new AFO orthotics.  Order already signed but needs medical documentation from F-F.  Can we fax clinic notes if necessary?  Thanks,  Rosio Salas MD  Internal Medicine

## 2021-06-21 NOTE — PROGRESS NOTES
Jose Alfredo is a 61 year old who is being evaluated via a billable video visit.      How would you like to obtain your AVS? MyChart  If the video visit is dropped, the invitation should be resent by: Text to cell phone: [phone]  Will anyone else be joining your video visit? No        Jose Alfredo Tomlinson is a 61 year old male who is being evaluated via a billable video visit.      The patient has consented to a video visit and informed that video and telephone visits are being performed during the COVID-19 pandemic in order to mitigate the risk of an in office visit for appropriate candidates/issues. If during the course of the call the physician/provider feels a video visit is not appropriate, you will not be charged for this service. If the provider feels that they are unable to assess your concerns without an in person visit, you will be advised of this limitation and depending on the nature of the concern, advised to seek in person care if your provider feels you need urgent evaluation.      Subjective     Jose Alfredo Tomlinson is a 61 year old male who presents to clinic today for the following health issues:    Chief Complaint   Patient presents with     Recheck Medication     pt would like to discuss order for new brace       HPI  PMH notable for DM1 c/b retinopathy, neuropathy, PAD, ED s/p SPK 2007, HTN, now euglycemic. This was a video visit.    Jose Alfredo needs a F-F for DME.  Requests a new R AFO given his strap broke and it's taped.  States the L is also cracked but he isn't due for a new one.  Gets them from [facility].    Otherwise, no acute health concerns. He will f/u with ophtho.  Transplant stable.  Reports stable BP.  Labs reviewed.        Review of external notes as documented above                   Patient Active Problem List   Diagnosis     Pancreas transplanted (H)     S/P kidney transplant     History of diabetes mellitus, type I     Hyperlipidemia     Peripheral neuropathy     PAD (peripheral  artery disease) (H)     Skin exam, screening for cancer     Seborrheic keratosis     Acquired perforating dermatosis     Cellulitis     Atrial bigeminy     Kidney replaced by transplant     Pancreas replaced by transplant (H)     Benign essential hypertension     Diabetic neuropathy, type I diabetes mellitus (H)     Right foot pain     Left foot pain     Tactile anesthesia     Personal history of diabetic foot ulcer     Right foot drop     Wrist drop, bilateral     Swelling of limb     Immunosuppression (H)     Past Surgical History:   Procedure Laterality Date     CATARACT IOL, RT/LT Bilateral      COLONOSCOPY  12/31/2013    Procedure: COMBINED COLONOSCOPY, SINGLE BIOPSY/POLYPECTOMY BY BIOPSY;  COLONOSCOPY;  Surgeon: Isac Colby MD;  Location:  GI     ESOPHAGOSCOPY, GASTROSCOPY, DUODENOSCOPY (EGD), COMBINED  1/20/2012    Procedure:COMBINED ESOPHAGOSCOPY, GASTROSCOPY, DUODENOSCOPY (EGD); Surgeon:GAB MARTIN; Location: GI     IR MISCELLANEOUS PROCEDURE  8/12/2003     IR MISCELLANEOUS PROCEDURE  8/12/2003     REPAIR PTOSIS Left 3/15/2019    Procedure: LEFT UPPER EYELID PTOSIS REPAIR;  Surgeon: Rj Jose MD;  Location:  OR     TRANSPLANT  2007    K/P       Social History     Tobacco Use     Smoking status: Never Smoker     Smokeless tobacco: Never Used   Substance Use Topics     Alcohol use: No     Family History   Problem Relation Age of Onset     Diabetes Son      Arthritis Mother      Diabetes Father      Diabetes Paternal Grandfather      Cancer Paternal Grandfather         Skin cancer     Cancer Maternal Grandfather         Skin cancer     Glaucoma No family hx of      Macular Degeneration No family hx of          Current Outpatient Medications   Medication Sig Dispense Refill     ascorbic acid (VITAMIN C) 500 MG tablet Take 2 tablets (1,000 mg) by mouth daily 180 tablet 1     aspirin (SM ASPIRIN ADULT LOW STRENGTH) 81 MG EC tablet Take 1 tablet (81 mg) by mouth daily 90 tablet  0     CELLCEPT (BRAND) 500 MG tablet Take 1 tablet (500 mg) by mouth 2 times daily 60 tablet 1     omeprazole (PRILOSEC) 20 MG DR capsule TAKE ONE CAPSULE BY MOUTH TWICE A  capsule 1     ondansetron (ZOFRAN-ODT) 4 MG ODT tab DISSOLVE ONE TABLET ON TONGUE EVERY 8 HOURS AS NEEDED FOR NAUSEA 10 tablet 2     order for DME Right carbon fiber AFO replacement due to fracture at posterior strut and due to age of brace greater than 5 years.  Patient requires AFOs for support and stability to enable safe ambulation 1 Units 1     order for DME Equipment being ordered: therapeutic shoes and insoles. For Peripheral neuropathy, diabetes type 1 and poor circulation 1 Package 1     PROGRAF (BRAND) 0.5 MG capsule Take 1 capsule (0.5 mg) by mouth 2 times daily 60 capsule 11     PROGRAF (BRAND) 1 MG capsule Take 1 capsule (1 mg) by mouth 2 times daily 60 capsule 11     sodium bicarbonate 650 MG tablet TAKE THREE TABLETS BY MOUTH TWICE A  tablet 3     Vitamin D3 (CHOLECALCIFEROL) 25 mcg (1000 units) tablet Take 1 tablet (25 mcg) by mouth daily 90 tablet 4     amLODIPine (NORVASC) 5 MG tablet Take 1 tablet (5 mg) by mouth daily 90 tablet 11     ganciclovir 0.15 % GEL Use one drop in right eye 5 times each day for 7 days. 1 Tube 1     Multiple Vitamin (TAB-A-AMBER) TABS Take 1 tablet by mouth daily 90 tablet 3     multivitamin, therapeutic with minerals (MULTI-VITAMIN) TABS tablet Take 1 tablet by mouth daily 100 each 3     vitamin C (ASCORBIC ACID) 500 MG tablet TAKE TWO TABLETS BY MOUTH EVERY  tablet 1     vitamin C (ASCORBIC ACID) 500 MG tablet Take 2 tablets (1,000 mg) by mouth daily 180 tablet 3     No Known Allergies      Review of Systems   A detailed ROS was performed and was negative unless indicated in the HPI above.        Physical Exam   There were no vitals taken for this visit.  Wt Readings from Last 2 Encounters:   01/28/21 79.4 kg (175 lb 1.6 oz)   05/07/19 89.4 kg (197 lb)      Ht Readings from Last 2  "Encounters:   03/22/19 1.81 m (5' 11.26\")   03/01/19 1.829 m (6')     GENERAL: healthy, alert and no distress  HEAD: Normocephalic, atraumatic  EYES: Eyes grossly normal to inspection, EOMI and conjunctivae and sclerae normal  RESP: Speaking in full sentences, unlabored, no audible wheezes or cough  SKIN: no suspicious lesions or rashes, no jaundice  NEURO: oriented, and speech normal  PSYCH: mentation appears normal, affect normal/bright          Diagnostic Test Results:  Labs reviewed in Epic            Assessment and Plan:  Jose Alfredo was seen today for recheck medication.  Jose Alfredo needs a new R AFO, given his current one is in disrepair.  This is necessary for his safety and comfort during ambulation given chronic foot drop, neuropathy and fall risk.   I did advise next visit should be in person given last several were virtual.    Diagnoses and all orders for this visit:    Diabetic neuropathy, type I diabetes mellitus (H)    S/P kidney transplant    Pancreas transplanted (H)    Right foot drop    Left foot drop          Video-Visit Details    Type of service:  Video Visit    Video Start/End Time: 5:02 PM 5:11 PM  9 min    Originating Location (pt. Location): Home    Distant Location (provider location):  [facility]     Mode of Communication:  Video Conference via  [employer] or  [employer]        Rosio Salas MD  Internal Medicine              "

## 2021-06-21 NOTE — NURSING NOTE
Chief Complaint   Patient presents with     Recheck Medication     pt would like to discuss order for terrance Rodrigues CMA, EMT at 3:25 PM on 6/21/2021.

## 2021-06-22 NOTE — TELEPHONE ENCOUNTER
Printed clinic notes from visit with Dr. Salas on 6/21. Attached to order and faxed to [facility].    Shanel Fine RN

## 2021-06-29 DIAGNOSIS — Z94.0 S/P KIDNEY TRANSPLANT: ICD-10-CM

## 2021-06-29 DIAGNOSIS — R11.15 NON-INTRACTABLE CYCLICAL VOMITING WITH NAUSEA: ICD-10-CM

## 2021-07-01 ENCOUNTER — MEDICAL CORRESPONDENCE (OUTPATIENT)
Dept: HEALTH INFORMATION MANAGEMENT | Facility: CLINIC | Age: 61
End: 2021-07-01

## 2021-07-01 RX ORDER — ONDANSETRON 4 MG/1
TABLET, ORALLY DISINTEGRATING ORAL
Qty: 10 TABLET | Refills: 2 | Status: SHIPPED | OUTPATIENT
Start: 2021-07-01 | End: 2021-09-02

## 2021-07-01 RX ORDER — ASPIRIN 81 MG/1
TABLET, DELAYED RELEASE ORAL
Qty: 90 TABLET | Refills: 0 | Status: SHIPPED | OUTPATIENT
Start: 2021-07-01 | End: 2021-08-06

## 2021-07-18 ENCOUNTER — HEALTH MAINTENANCE LETTER (OUTPATIENT)
Age: 61
End: 2021-07-18

## 2021-08-03 DIAGNOSIS — Z94.0 S/P KIDNEY TRANSPLANT: ICD-10-CM

## 2021-08-03 DIAGNOSIS — Z94.0 KIDNEY REPLACED BY TRANSPLANT: Primary | ICD-10-CM

## 2021-08-03 DIAGNOSIS — Z94.83 PANCREAS REPLACED BY TRANSPLANT (H): ICD-10-CM

## 2021-08-03 RX ORDER — MYCOPHENOLATE MOFETIL 500 MG/1
500 TABLET, FILM COATED ORAL 2 TIMES DAILY
Qty: 60 TABLET | Refills: 0 | Status: SHIPPED | OUTPATIENT
Start: 2021-08-03 | End: 2021-08-31

## 2021-08-05 ENCOUNTER — ANCILLARY PROCEDURE (OUTPATIENT)
Dept: ULTRASOUND IMAGING | Facility: CLINIC | Age: 61
End: 2021-08-05
Attending: INTERNAL MEDICINE
Payer: MEDICARE

## 2021-08-05 DIAGNOSIS — Z94.0 KIDNEY TRANSPLANTED: ICD-10-CM

## 2021-08-05 PROCEDURE — 76776 US EXAM K TRANSPL W/DOPPLER: CPT | Mod: GC | Performed by: RADIOLOGY

## 2021-08-09 ENCOUNTER — TELEPHONE (OUTPATIENT)
Dept: TRANSPLANT | Facility: CLINIC | Age: 61
End: 2021-08-09

## 2021-08-09 DIAGNOSIS — Z48.298 AFTERCARE FOLLOWING ORGAN TRANSPLANT: ICD-10-CM

## 2021-08-09 DIAGNOSIS — Z94.0 KIDNEY REPLACED BY TRANSPLANT: Primary | ICD-10-CM

## 2021-08-09 DIAGNOSIS — R93.429 ABNORMAL ULTRASOUND OF KIDNEY: ICD-10-CM

## 2021-08-09 NOTE — TELEPHONE ENCOUNTER
ISSUE  Repeat renal transplant US done to f/u on a 0.9 cm hypoechoic focus seen on his February US.       PLAN  Sara Lundy MD Harris, Kathleen, RN  F/up US in 1 year       OUTCOME  My Chart message sent to Jose Alfredo.  Order placed for US.  Message sent to scheduling for repeat US.

## 2021-08-31 ENCOUNTER — TELEPHONE (OUTPATIENT)
Dept: TRANSPLANT | Facility: CLINIC | Age: 61
End: 2021-08-31

## 2021-08-31 DIAGNOSIS — Z94.0 KIDNEY REPLACED BY TRANSPLANT: ICD-10-CM

## 2021-08-31 DIAGNOSIS — R11.15 NON-INTRACTABLE CYCLICAL VOMITING WITH NAUSEA: ICD-10-CM

## 2021-08-31 DIAGNOSIS — Z94.83 PANCREAS REPLACED BY TRANSPLANT (H): Primary | ICD-10-CM

## 2021-08-31 DIAGNOSIS — Z94.83 PANCREAS REPLACED BY TRANSPLANT (H): ICD-10-CM

## 2021-08-31 RX ORDER — MYCOPHENOLATE MOFETIL 500 MG/1
500 TABLET, FILM COATED ORAL 2 TIMES DAILY
Qty: 60 TABLET | Refills: 0 | Status: SHIPPED | OUTPATIENT
Start: 2021-08-31 | End: 2021-09-28

## 2021-08-31 NOTE — TELEPHONE ENCOUNTER
ISSUE:  Received refill for cell cept.  No labs since March 2021.     OUTCOME:  Called Jose Alfredo.  He states he will get labs in the next 1-2 weeks. Jose Alfredo aware in order to continue filling medication we need him to get labs.   Lab orders are in place.   One month refill sent.

## 2021-09-02 RX ORDER — ONDANSETRON 4 MG/1
TABLET, ORALLY DISINTEGRATING ORAL
Qty: 10 TABLET | Refills: 2 | Status: SHIPPED | OUTPATIENT
Start: 2021-09-02 | End: 2021-09-18

## 2021-09-02 RX ORDER — SODIUM BICARBONATE 650 MG/1
TABLET ORAL
Qty: 180 TABLET | Refills: 3 | Status: SHIPPED | OUTPATIENT
Start: 2021-09-02 | End: 2021-12-21

## 2021-09-12 ENCOUNTER — HEALTH MAINTENANCE LETTER (OUTPATIENT)
Age: 61
End: 2021-09-12

## 2021-09-17 DIAGNOSIS — R11.15 NON-INTRACTABLE CYCLICAL VOMITING WITH NAUSEA: ICD-10-CM

## 2021-09-18 RX ORDER — ONDANSETRON 4 MG/1
TABLET, ORALLY DISINTEGRATING ORAL
Qty: 10 TABLET | Refills: 0 | Status: SHIPPED | OUTPATIENT
Start: 2021-09-18 | End: 2021-09-26

## 2021-09-18 NOTE — CONFIDENTIAL NOTE
6/21/2021  [facility] Medicine Rosio Rousseau MD    Internal Medicine    ondansetron (ZOFRAN-ODT) 4 MG ODT tab    Routed because: has had 3 rfs since 9/2/21.  10 tab sent today.

## 2021-09-20 ENCOUNTER — LAB (OUTPATIENT)
Dept: LAB | Facility: CLINIC | Age: 61
End: 2021-09-20
Payer: MEDICARE

## 2021-09-20 DIAGNOSIS — Z79.899 OTHER LONG TERM (CURRENT) DRUG THERAPY: ICD-10-CM

## 2021-09-20 DIAGNOSIS — Z94.0 KIDNEY TRANSPLANTED: ICD-10-CM

## 2021-09-20 DIAGNOSIS — Z94.0 KIDNEY REPLACED BY TRANSPLANT: ICD-10-CM

## 2021-09-20 DIAGNOSIS — Z94.83 PANCREAS REPLACED BY TRANSPLANT (H): ICD-10-CM

## 2021-09-20 DIAGNOSIS — E78.00 HIGH CHOLESTEROL: ICD-10-CM

## 2021-09-20 DIAGNOSIS — Z94.83 PANCREAS TRANSPLANTED (H): ICD-10-CM

## 2021-09-20 LAB
AMYLASE SERPL-CCNC: 59 U/L (ref 30–110)
ANION GAP SERPL CALCULATED.3IONS-SCNC: 4 MMOL/L (ref 3–14)
BUN SERPL-MCNC: 28 MG/DL (ref 7–30)
CALCIUM SERPL-MCNC: 9.2 MG/DL (ref 8.5–10.1)
CHLORIDE BLD-SCNC: 116 MMOL/L (ref 94–109)
CHOLEST SERPL-MCNC: 222 MG/DL
CO2 SERPL-SCNC: 21 MMOL/L (ref 20–32)
CREAT SERPL-MCNC: 1.05 MG/DL (ref 0.66–1.25)
CREAT UR-MCNC: 70 MG/DL
ERYTHROCYTE [DISTWIDTH] IN BLOOD BY AUTOMATED COUNT: 13.6 % (ref 10–15)
FASTING STATUS PATIENT QL REPORTED: YES
GFR SERPL CREATININE-BSD FRML MDRD: 77 ML/MIN/1.73M2
GLUCOSE BLD-MCNC: 104 MG/DL (ref 70–99)
HBA1C MFR BLD: 5.1 % (ref 0–5.6)
HCT VFR BLD AUTO: 45.7 % (ref 40–53)
HDLC SERPL-MCNC: 53 MG/DL
HGB BLD-MCNC: 14.4 G/DL (ref 13.3–17.7)
LDLC SERPL CALC-MCNC: 149 MG/DL
LIPASE SERPL-CCNC: 113 U/L (ref 73–393)
MCH RBC QN AUTO: 27.5 PG (ref 26.5–33)
MCHC RBC AUTO-ENTMCNC: 31.5 G/DL (ref 31.5–36.5)
MCV RBC AUTO: 87 FL (ref 78–100)
NONHDLC SERPL-MCNC: 169 MG/DL
PLATELET # BLD AUTO: 182 10E3/UL (ref 150–450)
POTASSIUM BLD-SCNC: 4.9 MMOL/L (ref 3.4–5.3)
PROT UR-MCNC: 1.22 G/L
PROT/CREAT 24H UR: 1.74 G/G CR (ref 0–0.2)
RBC # BLD AUTO: 5.24 10E6/UL (ref 4.4–5.9)
SODIUM SERPL-SCNC: 141 MMOL/L (ref 133–144)
TRIGL SERPL-MCNC: 98 MG/DL
WBC # BLD AUTO: 6.5 10E3/UL (ref 4–11)

## 2021-09-20 PROCEDURE — 84450 TRANSFERASE (AST) (SGOT): CPT | Performed by: PATHOLOGY

## 2021-09-20 PROCEDURE — 84460 ALANINE AMINO (ALT) (SGPT): CPT | Performed by: PATHOLOGY

## 2021-09-20 PROCEDURE — 82040 ASSAY OF SERUM ALBUMIN: CPT | Performed by: PATHOLOGY

## 2021-09-20 PROCEDURE — 84155 ASSAY OF PROTEIN SERUM: CPT | Performed by: PATHOLOGY

## 2021-09-20 PROCEDURE — 84156 ASSAY OF PROTEIN URINE: CPT | Performed by: PATHOLOGY

## 2021-09-20 PROCEDURE — 87799 DETECT AGENT NOS DNA QUANT: CPT | Performed by: INTERNAL MEDICINE

## 2021-09-20 PROCEDURE — 83690 ASSAY OF LIPASE: CPT | Performed by: PATHOLOGY

## 2021-09-20 PROCEDURE — 82150 ASSAY OF AMYLASE: CPT | Performed by: PATHOLOGY

## 2021-09-20 PROCEDURE — 83036 HEMOGLOBIN GLYCOSYLATED A1C: CPT | Performed by: PATHOLOGY

## 2021-09-20 PROCEDURE — 80061 LIPID PANEL: CPT | Performed by: PATHOLOGY

## 2021-09-20 PROCEDURE — 36415 COLL VENOUS BLD VENIPUNCTURE: CPT | Performed by: PATHOLOGY

## 2021-09-20 PROCEDURE — 80048 BASIC METABOLIC PNL TOTAL CA: CPT | Performed by: PATHOLOGY

## 2021-09-20 PROCEDURE — 85027 COMPLETE CBC AUTOMATED: CPT | Performed by: PATHOLOGY

## 2021-09-20 PROCEDURE — 84075 ASSAY ALKALINE PHOSPHATASE: CPT | Performed by: PATHOLOGY

## 2021-09-21 ENCOUNTER — TELEPHONE (OUTPATIENT)
Dept: TRANSPLANT | Facility: CLINIC | Age: 61
End: 2021-09-21

## 2021-09-21 DIAGNOSIS — E78.00 HIGH CHOLESTEROL: ICD-10-CM

## 2021-09-21 DIAGNOSIS — Z94.0 KIDNEY TRANSPLANTED: Primary | ICD-10-CM

## 2021-09-21 DIAGNOSIS — Z94.83 PANCREAS TRANSPLANTED (H): ICD-10-CM

## 2021-09-21 LAB
ALBUMIN SERPL-MCNC: 3.6 G/DL (ref 3.4–5)
ALP SERPL-CCNC: 82 U/L (ref 40–150)
ALT SERPL W P-5'-P-CCNC: 22 U/L (ref 0–70)
AST SERPL W P-5'-P-CCNC: 30 U/L (ref 0–45)
BILIRUB DIRECT SERPL-MCNC: ABNORMAL MG/DL
BILIRUB SERPL-MCNC: ABNORMAL MG/DL
BKV DNA # SPEC NAA+PROBE: NOT DETECTED COPIES/ML
PROT SERPL-MCNC: 6.7 G/DL (ref 6.8–8.8)

## 2021-09-21 NOTE — TELEPHONE ENCOUNTER
LFT's ordered to be added on to specimen from 9/20.   Once results are in, will determine if lipitor 10 mg q day can be started.      Sara Lundy MD Harris, Kathleen, RN  Could update LFT & start lipitor 10mg qd

## 2021-09-22 NOTE — TELEPHONE ENCOUNTER
LFT's updated.  Ok to start Lipitor 10 mg daily per Dr. Lundy.  Called Jose Alfredo to discuss.  No answer, left v/m requesting a return call to sot office.

## 2021-09-25 DIAGNOSIS — R11.15 NON-INTRACTABLE CYCLICAL VOMITING WITH NAUSEA: ICD-10-CM

## 2021-09-26 RX ORDER — ONDANSETRON 4 MG/1
4 TABLET, ORALLY DISINTEGRATING ORAL EVERY 8 HOURS PRN
Qty: 10 TABLET | Refills: 5 | Status: SHIPPED | OUTPATIENT
Start: 2021-09-26 | End: 2021-10-21

## 2021-09-28 DIAGNOSIS — Z94.0 KIDNEY REPLACED BY TRANSPLANT: ICD-10-CM

## 2021-09-28 DIAGNOSIS — Z94.83 PANCREAS REPLACED BY TRANSPLANT (H): Primary | ICD-10-CM

## 2021-09-28 RX ORDER — MYCOPHENOLATE MOFETIL 500 MG/1
500 TABLET, FILM COATED ORAL 2 TIMES DAILY
Qty: 60 TABLET | Refills: 0 | Status: SHIPPED | OUTPATIENT
Start: 2021-09-28 | End: 2021-10-28

## 2021-09-28 RX ORDER — ATORVASTATIN CALCIUM 10 MG/1
10 TABLET, FILM COATED ORAL DAILY
Qty: 30 TABLET | Refills: 11 | Status: SHIPPED | OUTPATIENT
Start: 2021-09-28 | End: 2022-09-14

## 2021-09-28 NOTE — TELEPHONE ENCOUNTER
Spoke with Jose Alfredo.  He is agreeable to starting Lipitor for elevated cholesterol. Rx sent to specialty pharmacy.   Reviewed measures he can take to lower his cholesterol as well (healthy diet/exercising)

## 2021-10-03 ENCOUNTER — HOSPITAL ENCOUNTER (EMERGENCY)
Facility: CLINIC | Age: 61
Discharge: HOME | End: 2021-10-03
Attending: EMERGENCY MEDICINE | Admitting: EMERGENCY MEDICINE
Payer: MEDICARE

## 2021-10-03 VITALS
DIASTOLIC BLOOD PRESSURE: 91 MMHG | RESPIRATION RATE: 16 BRPM | WEIGHT: 175 LBS | BODY MASS INDEX: 23.7 KG/M2 | HEART RATE: 72 BPM | OXYGEN SATURATION: 98 % | HEIGHT: 72 IN | SYSTOLIC BLOOD PRESSURE: 149 MMHG | TEMPERATURE: 98.2 F

## 2021-10-03 DIAGNOSIS — N30.00 ACUTE CYSTITIS WITHOUT HEMATURIA: ICD-10-CM

## 2021-10-03 LAB
ALBUMIN SERPL-MCNC: 3.5 G/DL (ref 3.4–5)
ALBUMIN UR-MCNC: 50 MG/DL
ALP SERPL-CCNC: 93 U/L (ref 40–150)
ALT SERPL W P-5'-P-CCNC: 16 U/L (ref 0–70)
ANION GAP SERPL CALCULATED.3IONS-SCNC: 5 MMOL/L (ref 3–14)
APPEARANCE UR: ABNORMAL
AST SERPL W P-5'-P-CCNC: 12 U/L (ref 0–45)
BACTERIA #/AREA URNS HPF: ABNORMAL /HPF
BASOPHILS # BLD AUTO: 0.1 10E3/UL (ref 0–0.2)
BASOPHILS NFR BLD AUTO: 1 %
BILIRUB SERPL-MCNC: 0.4 MG/DL (ref 0.2–1.3)
BILIRUB UR QL STRIP: NEGATIVE
BUN SERPL-MCNC: 25 MG/DL (ref 7–30)
CALCIUM SERPL-MCNC: 9 MG/DL (ref 8.5–10.1)
CHLORIDE BLD-SCNC: 112 MMOL/L (ref 94–109)
CO2 SERPL-SCNC: 24 MMOL/L (ref 20–32)
COLOR UR AUTO: ABNORMAL
CREAT SERPL-MCNC: 1 MG/DL (ref 0.66–1.25)
EOSINOPHIL # BLD AUTO: 0.5 10E3/UL (ref 0–0.7)
EOSINOPHIL NFR BLD AUTO: 6 %
ERYTHROCYTE [DISTWIDTH] IN BLOOD BY AUTOMATED COUNT: 13.7 % (ref 10–15)
GFR SERPL CREATININE-BSD FRML MDRD: 82 ML/MIN/1.73M2
GLUCOSE BLD-MCNC: 100 MG/DL (ref 70–99)
GLUCOSE UR STRIP-MCNC: NEGATIVE MG/DL
HCT VFR BLD AUTO: 43.9 % (ref 40–53)
HGB BLD-MCNC: 14.1 G/DL (ref 13.3–17.7)
HGB UR QL STRIP: ABNORMAL
HOLD SPECIMEN: NORMAL
HOLD SPECIMEN: NORMAL
IMM GRANULOCYTES # BLD: 0 10E3/UL
IMM GRANULOCYTES NFR BLD: 0 %
KETONES UR STRIP-MCNC: NEGATIVE MG/DL
LEUKOCYTE ESTERASE UR QL STRIP: ABNORMAL
LYMPHOCYTES # BLD AUTO: 0.9 10E3/UL (ref 0.8–5.3)
LYMPHOCYTES NFR BLD AUTO: 13 %
MCH RBC QN AUTO: 28 PG (ref 26.5–33)
MCHC RBC AUTO-ENTMCNC: 32.1 G/DL (ref 31.5–36.5)
MCV RBC AUTO: 87 FL (ref 78–100)
MONOCYTES # BLD AUTO: 0.6 10E3/UL (ref 0–1.3)
MONOCYTES NFR BLD AUTO: 8 %
MUCOUS THREADS #/AREA URNS LPF: PRESENT /LPF
NEUTROPHILS # BLD AUTO: 5.3 10E3/UL (ref 1.6–8.3)
NEUTROPHILS NFR BLD AUTO: 72 %
NITRATE UR QL: NEGATIVE
NRBC # BLD AUTO: 0 10E3/UL
NRBC BLD AUTO-RTO: 0 /100
PH UR STRIP: 6.5 [PH] (ref 5–7)
PLATELET # BLD AUTO: 202 10E3/UL (ref 150–450)
POTASSIUM BLD-SCNC: 4.1 MMOL/L (ref 3.4–5.3)
PROT SERPL-MCNC: 6.8 G/DL (ref 6.8–8.8)
RBC # BLD AUTO: 5.04 10E6/UL (ref 4.4–5.9)
RBC URINE: 6 /HPF
SODIUM SERPL-SCNC: 141 MMOL/L (ref 133–144)
SP GR UR STRIP: 1.01 (ref 1–1.03)
UROBILINOGEN UR STRIP-MCNC: NORMAL MG/DL
WBC # BLD AUTO: 7.4 10E3/UL (ref 4–11)
WBC URINE: 166 /HPF

## 2021-10-03 PROCEDURE — 36415 COLL VENOUS BLD VENIPUNCTURE: CPT | Performed by: FAMILY MEDICINE

## 2021-10-03 PROCEDURE — 99284 EMERGENCY DEPT VISIT MOD MDM: CPT | Mod: 25 | Performed by: EMERGENCY MEDICINE

## 2021-10-03 PROCEDURE — 258N000003 HC RX IP 258 OP 636: Performed by: EMERGENCY MEDICINE

## 2021-10-03 PROCEDURE — 99285 EMERGENCY DEPT VISIT HI MDM: CPT | Performed by: EMERGENCY MEDICINE

## 2021-10-03 PROCEDURE — 81001 URINALYSIS AUTO W/SCOPE: CPT | Performed by: FAMILY MEDICINE

## 2021-10-03 PROCEDURE — 85025 COMPLETE CBC W/AUTO DIFF WBC: CPT | Performed by: FAMILY MEDICINE

## 2021-10-03 PROCEDURE — 80053 COMPREHEN METABOLIC PANEL: CPT | Performed by: EMERGENCY MEDICINE

## 2021-10-03 PROCEDURE — 87086 URINE CULTURE/COLONY COUNT: CPT | Performed by: EMERGENCY MEDICINE

## 2021-10-03 PROCEDURE — 81001 URINALYSIS AUTO W/SCOPE: CPT | Performed by: EMERGENCY MEDICINE

## 2021-10-03 PROCEDURE — 85025 COMPLETE CBC W/AUTO DIFF WBC: CPT | Performed by: EMERGENCY MEDICINE

## 2021-10-03 PROCEDURE — 250N000011 HC RX IP 250 OP 636: Performed by: EMERGENCY MEDICINE

## 2021-10-03 PROCEDURE — 82247 BILIRUBIN TOTAL: CPT | Performed by: FAMILY MEDICINE

## 2021-10-03 PROCEDURE — 96365 THER/PROPH/DIAG IV INF INIT: CPT | Performed by: EMERGENCY MEDICINE

## 2021-10-03 RX ORDER — CEFTRIAXONE 2 G/1
2 INJECTION, POWDER, FOR SOLUTION INTRAMUSCULAR; INTRAVENOUS ONCE
Status: COMPLETED | OUTPATIENT
Start: 2021-10-03 | End: 2021-10-03

## 2021-10-03 RX ORDER — CEFPODOXIME PROXETIL 100 MG/1
100 TABLET, FILM COATED ORAL 2 TIMES DAILY
Qty: 14 TABLET | Refills: 0 | Status: SHIPPED | OUTPATIENT
Start: 2021-10-03 | End: 2021-10-14

## 2021-10-03 RX ORDER — CEFPODOXIME PROXETIL 100 MG/1
100 TABLET, FILM COATED ORAL 2 TIMES DAILY
Qty: 14 TABLET | Refills: 0 | Status: SHIPPED | OUTPATIENT
Start: 2021-10-03 | End: 2021-10-03

## 2021-10-03 RX ADMIN — CEFTRIAXONE SODIUM 2 G: 2 INJECTION, POWDER, FOR SOLUTION INTRAMUSCULAR; INTRAVENOUS at 10:01

## 2021-10-03 RX ADMIN — SODIUM CHLORIDE 1000 ML: 9 INJECTION, SOLUTION INTRAVENOUS at 09:56

## 2021-10-03 ASSESSMENT — ENCOUNTER SYMPTOMS
DYSURIA: 1
FEVER: 0
DIARRHEA: 0
COUGH: 0
BACK PAIN: 0
FLANK PAIN: 0
VOMITING: 0
COLOR CHANGE: 0
SHORTNESS OF BREATH: 0
NAUSEA: 0
HEADACHES: 0
ABDOMINAL PAIN: 1

## 2021-10-03 ASSESSMENT — MIFFLIN-ST. JEOR: SCORE: 1646.79

## 2021-10-03 NOTE — DISCHARGE INSTRUCTIONS
Please make an appointment to follow up with Your Primary Care Provider in 7-10 days even if entirely better.    You will be called with the results of your urine culture if it shows that you need to be on a different antibiotic. A new prescription can be called in for you to fill, if this is the case.     Drink plenty of fluids. Take your antibiotics as directed.     Return to the emergency department for worsening pain, blood in your urine, fever, recurrent vomiting or inability to tolerate oral fluids, fainting, or decreased urine output.     You should follow up or call your primary care doctor or transplant nephrologist in 7-10 days. In some cases, they will do additional testing to see if there is an underlying cause for your UTIs or discuss starting you on preventative antibiotics.

## 2021-10-03 NOTE — ED TRIAGE NOTES
Pt presents ambulatory to triage with reports of feeling like he has a possible bladder infection.  Pt reports urinary dysuria and frequency. Denies fever or chills at home.  Reports increased penile pain.    Temp 98.2 on arrival.    ABCDs in tact.      Hx kidney and pancreas transplants

## 2021-10-03 NOTE — ED PROVIDER NOTES
ED Provider Note  [facility]      History     Chief Complaint   Patient presents with     UTI     The history is provided by the patient and medical records.     Jose Alfredo Tomlinson is a 61 year old male with a past medical history significant for type 1 diabetes mellitus (s/p kidney and pancrease transplant-2007) who presents to the Emergency Department for evaluation of increased urinary urgency and concern for a UTI.  Patient reports his symptoms initially began yesterday (10/2) with increased urinary urgency, dysuria, and penile pain.  Patient reports it often feels like he has to urinate, but it is mostly unable to when he gets this feeling of urgency.  Patient denies fevers, nausea, or vomiting.  He does endorse a slight abdominal pain this morning.  He denies notable penile discharge or skin changes to the penis.  Patient denies concern for STD.  He reports he has had UTIs before, but none fairly recently.  Patient denies back or flank pain here in the ED.  He states he has been eating and drinking fluids at baseline.      Past Medical History  Past Medical History:   Diagnosis Date     Cataracts      Foot drop      Gastroparesis      GERD (gastroesophageal reflux disease)      History of diabetes mellitus, type I      Hyperlipidemia      Hypertension      Pancreas transplanted (H) 2007    hx of rejection in past      Peripheral neuropathy      S/P kidney transplant 2007     Past Surgical History:   Procedure Laterality Date     CATARACT IOL, RT/LT Bilateral      COLONOSCOPY  12/31/2013    Procedure: COMBINED COLONOSCOPY, SINGLE BIOPSY/POLYPECTOMY BY BIOPSY;  COLONOSCOPY;  Surgeon: Isac Colby MD;  Location:  GI     ESOPHAGOSCOPY, GASTROSCOPY, DUODENOSCOPY (EGD), COMBINED  1/20/2012    Procedure:COMBINED ESOPHAGOSCOPY, GASTROSCOPY, DUODENOSCOPY (EGD); Surgeon:GAB MARTIN; Location: GI     IR MISCELLANEOUS PROCEDURE  8/12/2003     IR MISCELLANEOUS PROCEDURE   8/12/2003     IR VENOUS PTA  8/12/2003     REPAIR PTOSIS Left 3/15/2019    Procedure: LEFT UPPER EYELID PTOSIS REPAIR;  Surgeon: Rj Jose MD;  Location: [facility] OR     TRANSPLANT  2007    K/P     cefpodoxime (VANTIN) 100 MG tablet  amLODIPine (NORVASC) 5 MG tablet  ascorbic acid (VITAMIN C) 500 MG tablet  aspirin (SM ASPIRIN ADULT LOW STRENGTH) 81 MG EC tablet  atorvastatin (LIPITOR) 10 MG tablet  CELLCEPT (BRAND) 500 MG tablet  ganciclovir 0.15 % GEL  Multiple Vitamin (TAB-A-AMBER) TABS  multivitamin, therapeutic with minerals (MULTI-VITAMIN) TABS tablet  omeprazole (PRILOSEC) 20 MG DR capsule  ondansetron (ZOFRAN-ODT) 4 MG ODT tab  order for DME  order for DME  PROGRAF (BRAND) 0.5 MG capsule  PROGRAF (BRAND) 1 MG capsule  sodium bicarbonate 650 MG tablet  vitamin C (ASCORBIC ACID) 500 MG tablet  vitamin C (ASCORBIC ACID) 500 MG tablet  Vitamin D3 (CHOLECALCIFEROL) 25 mcg (1000 units) tablet      No Known Allergies  Family History  Family History   Problem Relation Age of Onset     Diabetes Son      Arthritis Mother      Diabetes Father      Diabetes Paternal Grandfather      Cancer Paternal Grandfather         Skin cancer     Cancer Maternal Grandfather         Skin cancer     Glaucoma No family hx of      Macular Degeneration No family hx of      Social History   Social History     Tobacco Use     Smoking status: Never Smoker     Smokeless tobacco: Never Used   Substance Use Topics     Alcohol use: No     Drug use: No      Past medical history, past surgical history, medications, allergies, family history, and social history were reviewed with the patient. No additional pertinent items.       Review of Systems   Constitutional: Negative for fever.   Respiratory: Negative for cough and shortness of breath.    Gastrointestinal: Positive for abdominal pain. Negative for diarrhea, nausea and vomiting.   Genitourinary: Positive for dysuria, penile pain and urgency. Negative for flank pain.   Musculoskeletal:  Negative for back pain.   Skin: Negative for color change.   Allergic/Immunologic: Positive for immunocompromised state.   Neurological: Negative for headaches.   All other systems reviewed and are negative.      Physical Exam   BP: (!) 172/97  Pulse: 76  Temp: 98.2  F (36.8  C)  Resp: 16  Height: 182.9 cm (6')  Weight: 79.4 kg (175 lb)  SpO2: 99 %  Physical Exam  Vitals and nursing note reviewed.   Constitutional:       General: He is not in acute distress.     Appearance: He is well-developed. He is not ill-appearing or diaphoretic.   HENT:      Head: Normocephalic and atraumatic.      Nose: Nose normal.   Eyes:      General: No scleral icterus.     Conjunctiva/sclera: Conjunctivae normal.   Cardiovascular:      Rate and Rhythm: Normal rate.   Pulmonary:      Effort: Pulmonary effort is normal. No respiratory distress.      Breath sounds: No stridor.   Abdominal:      General: There is no distension.      Palpations: Abdomen is soft.      Tenderness: There is no abdominal tenderness. There is no right CVA tenderness, left CVA tenderness, guarding or rebound.   Musculoskeletal:         General: No deformity or signs of injury. Normal range of motion.      Cervical back: Normal range of motion and neck supple. No rigidity.   Skin:     General: Skin is warm and dry.      Coloration: Skin is not jaundiced.      Findings: No rash.   Neurological:      General: No focal deficit present.      Mental Status: He is alert and oriented to person, place, and time.   Psychiatric:         Behavior: Behavior normal.         Thought Content: Thought content normal.         ED Course   9:25 AM  The patient was seen and examined by Tereza Batista MD in Room [facility].     Procedures              Results for orders placed or performed during the hospital encounter of 10/03/21   UA with Microscopic reflex to Culture     Status: Abnormal    Specimen: Urine, Clean Catch   Result Value Ref Range    Color Urine Light Yellow Colorless, Straw,  Light Yellow, Yellow    Appearance Urine Slightly Cloudy (A) Clear    Glucose Urine Negative Negative mg/dL    Bilirubin Urine Negative Negative    Ketones Urine Negative Negative mg/dL    Specific Gravity Urine 1.014 1.003 - 1.035    Blood Urine Small (A) Negative    pH Urine 6.5 5.0 - 7.0    Protein Albumin Urine 50  (A) Negative mg/dL    Urobilinogen Urine Normal Normal, 2.0 mg/dL    Nitrite Urine Negative Negative    Leukocyte Esterase Urine Large (A) Negative    Bacteria Urine Many (A) None Seen /HPF    Mucus Urine Present (A) None Seen /LPF    RBC Urine 6 (H) <=2 /HPF    WBC Urine 166 (H) <=5 /HPF    Narrative    Urine Culture ordered based on laboratory criteria   Comprehensive metabolic panel     Status: Abnormal   Result Value Ref Range    Sodium 141 133 - 144 mmol/L    Potassium 4.1 3.4 - 5.3 mmol/L    Chloride 112 (H) 94 - 109 mmol/L    Carbon Dioxide (CO2) 24 20 - 32 mmol/L    Anion Gap 5 3 - 14 mmol/L    Urea Nitrogen 25 7 - 30 mg/dL    Creatinine 1.00 0.66 - 1.25 mg/dL    Calcium 9.0 8.5 - 10.1 mg/dL    Glucose 100 (H) 70 - 99 mg/dL    Alkaline Phosphatase 93 40 - 150 U/L    AST 12 0 - 45 U/L    ALT 16 0 - 70 U/L    Protein Total 6.8 6.8 - 8.8 g/dL    Albumin 3.5 3.4 - 5.0 g/dL    Bilirubin Total 0.4 0.2 - 1.3 mg/dL    GFR Estimate 82 >60 mL/min/1.73m2   CBC with platelets and differential     Status: None   Result Value Ref Range    WBC Count 7.4 4.0 - 11.0 10e3/uL    RBC Count 5.04 4.40 - 5.90 10e6/uL    Hemoglobin 14.1 13.3 - 17.7 g/dL    Hematocrit 43.9 40.0 - 53.0 %    MCV 87 78 - 100 fL    MCH 28.0 26.5 - 33.0 pg    MCHC 32.1 31.5 - 36.5 g/dL    RDW 13.7 10.0 - 15.0 %    Platelet Count 202 150 - 450 10e3/uL    % Neutrophils 72 %    % Lymphocytes 13 %    % Monocytes 8 %    % Eosinophils 6 %    % Basophils 1 %    % Immature Granulocytes 0 %    NRBCs per 100 WBC 0 <1 /100    Absolute Neutrophils 5.3 1.6 - 8.3 10e3/uL    Absolute Lymphocytes 0.9 0.8 - 5.3 10e3/uL    Absolute Monocytes 0.6 0.0 - 1.3  10e3/uL    Absolute Eosinophils 0.5 0.0 - 0.7 10e3/uL    Absolute Basophils 0.1 0.0 - 0.2 10e3/uL    Absolute Immature Granulocytes 0.0 <=0.0 10e3/uL    Absolute NRBCs 0.0 10e3/uL   [place] Draw     Status: None (In process)    Narrative    The following orders were created for panel order [place] Draw.  Procedure                               Abnormality         Status                     ---------                               -----------         ------                     Extra Blue Top Tube[[id number]]                              In process                 Extra Red Top Tube[[id number]]                               In process                   Please view results for these tests on the individual orders.   CBC with platelets differential     Status: None    Narrative    The following orders were created for panel order CBC with platelets differential.  Procedure                               Abnormality         Status                     ---------                               -----------         ------                     CBC with platelets and d...[[id number]]                      Final result                 Please view results for these tests on the individual orders.     Medications   cefTRIAXone (ROCEPHIN) 2 g vial to attach to  ml bag for ADULTS or NS 50 ml bag for PEDS (has no administration in time range)   0.9% sodium chloride BOLUS (1,000 mLs Intravenous New Bag 10/3/21 0918)        Assessments & Plan (with Medical Decision Making)   Jose Alfredo Tomlinson is a 61 year old male with a past medical history significant for type 1 diabetes mellitus (s/p kidney and pancrease transplant-2007) who presents to the Emergency Department for evaluation of increased urinary urgency and concern for a UTI.     Ddx: simple cystitis in kidney tx patient, pyelonephritis, MDR UTI    Patient with h/o UTIs p/w classic urinary sxs. No fever, flank pain, n/v, other systemic sxs. Denies concern for STI. Reviewed prior urine  cxs. Has had E. Coli with resist ence to PCN. Transplant done in 2007 so patient is ok for a 7 day course of abx for simple cystitis. Creat and WBC nl. UA c/w UTI. Cx pending. Will give a dose of ceftriaxone and liter IVF prior to discharge. Given rx Cefpodoxime for 7 days. Encouraged close PCP or Transplant follow up even if sxs resolved, in case they want to do more work up for cause of UTI or consider ppx abx. Patient verbalized understanding.      I have reviewed the nursing notes. I have reviewed the findings, diagnosis, plan and need for follow up with the patient.    Current Discharge Medication List      START taking these medications    Details   cefpodoxime (VANTIN) 100 MG tablet Take 1 tablet (100 mg) by mouth 2 times daily  Qty: 14 tablet, Refills: 0             Final diagnoses:   Acute cystitis without hematuria   I, Nigel Riley, am serving as a trained medical scribe to document services personally performed by Tereza Batista MD, based on the provider's statements to me.      ITereza MD, was physically present and have reviewed and verified the accuracy of this note documented by Nigel Riley.      --  Tereza Batista MD  [facility] EMERGENCY DEPARTMENT  10/3/2021     Tereza Batista MD  10/03/21 1007

## 2021-10-04 NOTE — RESULT ENCOUNTER NOTE
[facility] Emergency Dept discharge antibiotic (if prescribed): Cefpodoxime (Vantin) 100 MG tablet, 1 tablet (100 mg) by mouth 2 times daily for 7 days  Date of Rx (if applicable):  10/3/21  No changes in treatment per [facility] ED Lab Result Urine culture protocol.

## 2021-10-05 LAB — BACTERIA UR CULT: ABNORMAL

## 2021-10-14 ENCOUNTER — OFFICE VISIT (OUTPATIENT)
Dept: INTERNAL MEDICINE | Facility: CLINIC | Age: 61
End: 2021-10-14
Payer: MEDICARE

## 2021-10-14 VITALS
WEIGHT: 195.9 LBS | SYSTOLIC BLOOD PRESSURE: 155 MMHG | OXYGEN SATURATION: 97 % | DIASTOLIC BLOOD PRESSURE: 74 MMHG | BODY MASS INDEX: 26.53 KG/M2 | HEART RATE: 81 BPM | HEIGHT: 72 IN

## 2021-10-14 DIAGNOSIS — N30.00 ACUTE CYSTITIS WITHOUT HEMATURIA: Primary | ICD-10-CM

## 2021-10-14 DIAGNOSIS — Z94.0 S/P KIDNEY TRANSPLANT: ICD-10-CM

## 2021-10-14 PROCEDURE — 99213 OFFICE O/P EST LOW 20 MIN: CPT | Mod: GC

## 2021-10-14 ASSESSMENT — MIFFLIN-ST. JEOR: SCORE: 1741.6

## 2021-10-14 ASSESSMENT — PAIN SCALES - GENERAL: PAINLEVEL: NO PAIN (0)

## 2021-10-14 NOTE — NURSING NOTE
Chief Complaint   Patient presents with     Hospital F/U     ER follow up       PHILLIP Knox at 12:35 PM on 10/14/2021.

## 2021-10-14 NOTE — PROGRESS NOTES
[facility]       SUBJECTIVE:  Jose Alfredo Tomlinson is a 61 year old male with a PMHx of DDKT, pancreas tx (SPK in 2007 for DM1), HTN, who comes in for ED follow up of UTI.     He presented on 10/3/21 to the ED with 1 day history of increased urinary urgency, frequency, dysuria, penile pain. Patient received Cefpodoxime for 7 days. Urine became cloudy. No fevers, chills, back/flank pain, abdominal pain, nausea, vomiting, diarrhea, constipation. Usually has a UTI once every couple of years, have been uncomplicated in the past.     No symptoms today.  Denies fevers, chills, dysuria, abdominal pain, nausea, vomiting, diarrhea, constipation, cloudy urine, hematuria.    Last seen by transplant nephrology in January 2021. Labs done September, repeat labs at the ED in October. No appointment with transplant nephrology coming up.     Medications and allergies reviewed by me today.     ROS:   10 point ROS negative unless mentioned in HPI.    OBJECTIVE:    BP (!) 155/74 (BP Location: Right arm, Patient Position: Sitting, Cuff Size: Adult Regular)   Pulse 81   Ht 1.829 m (6')   Wt 88.9 kg (195 lb 14.4 oz)   SpO2 97%   BMI 26.57 kg/m     Wt Readings from Last 1 Encounters:   10/14/21 88.9 kg (195 lb 14.4 oz)       GENERAL APPEARANCE: healthy, alert and no distress     HEENT: normocephalic, atraumatic, no scleral icterus     NECK: no adenopathy, no asymmetry, masses, or scars and thyroid normal to palpation     RESP: lungs clear to auscultation - no rales, rhonchi or wheezes     CV: regular rates and rhythm, normal S1 S2, no S3 or S4 and no murmur, click or rub     ABDOMEN:  soft, nontender, bowel sounds normal. No CVA tenderness bilaterally      SKIN: no suspicious lesions or rashes seen on exposed surfaces     NEURO: Normal strength and tone, sensory exam grossly normal, mentation intact and speech normal     PSYCH: mentation appears normal. and affect normal/bright     LYMPHATICS: No cervical  adenopathy     ASSESSMENT/PLAN:    Jose Alfredo was seen today for hospital f/u.    Diagnoses and all orders for this visit:    Acute cystitis without hematuria  S/P kidney transplant  Currently asymptomatic after course of antibiotics. No indication for labs today. Advised patient to reach out to his transplant coordinator to inform them of the UTI, improvement with antibiotics, to make sure no follow up with them is needed.     Pt should return to clinic if symptoms return, worsen.     Sourav Brewer MD  Oct 14, 2021    Pt was seen and plan of care discussed with Dr. Andres.

## 2021-10-14 NOTE — PATIENT INSTRUCTIONS
If your symptoms return, fevers, chills, pain urination, contact transplant coordinator.     Would recommend contacting transplant coordinator now to update them on antibiotic course, improvement in symptoms, to make sure no follow up is needed.

## 2021-10-14 NOTE — PROGRESS NOTES
I, Sim Andres MD saw the patient with the resident, and agree with the resident's findings and plan of care as documented in the resident's note.  BP (!) 155/74 (BP Location: Right arm, Patient Position: Sitting, Cuff Size: Adult Regular)   Pulse 81   Ht 1.829 m (6')   Wt 88.9 kg (195 lb 14.4 oz)   SpO2 97%   BMI 26.57 kg/m    I personally reviewed vital signs and past record.  Key findings: type 1 diabetes s/p TXP kid/panc  Recent UTI treated, recommended follow-up by ED.  OK to return to nephrology, should call care coordinator to set up.

## 2021-10-19 DIAGNOSIS — R11.15 NON-INTRACTABLE CYCLICAL VOMITING WITH NAUSEA: ICD-10-CM

## 2021-10-21 RX ORDER — ONDANSETRON 4 MG/1
4 TABLET, ORALLY DISINTEGRATING ORAL EVERY 8 HOURS PRN
Qty: 10 TABLET | Refills: 5 | Status: SHIPPED | OUTPATIENT
Start: 2021-10-21 | End: 2023-05-25

## 2021-10-21 NOTE — TELEPHONE ENCOUNTER
ONDANSETRON 4MG TBDP  10 Tabs, 5 Refills sent to pharm  Last visit: 6/21/2021    Svetlana Salgado RN  Central Triage Red Flags/Med Refills

## 2021-10-28 DIAGNOSIS — K21.9 ESOPHAGEAL REFLUX: Primary | ICD-10-CM

## 2021-10-28 DIAGNOSIS — G62.9 PERIPHERAL NEUROPATHY: ICD-10-CM

## 2021-10-28 DIAGNOSIS — Z94.83 PANCREAS REPLACED BY TRANSPLANT (H): ICD-10-CM

## 2021-10-28 DIAGNOSIS — Z94.0 KIDNEY REPLACED BY TRANSPLANT: Primary | ICD-10-CM

## 2021-10-28 DIAGNOSIS — Z94.83 PANCREAS TRANSPLANTED (H): ICD-10-CM

## 2021-10-28 DIAGNOSIS — Z94.0 S/P KIDNEY TRANSPLANT: ICD-10-CM

## 2021-10-28 RX ORDER — MYCOPHENOLATE MOFETIL 500 MG/1
500 TABLET, FILM COATED ORAL 2 TIMES DAILY
Qty: 60 TABLET | Refills: 11 | Status: SHIPPED | OUTPATIENT
Start: 2021-10-28 | End: 2022-10-17

## 2021-11-02 RX ORDER — ASCORBIC ACID 500 MG
TABLET ORAL
Qty: 180 TABLET | Refills: 2 | Status: SHIPPED | OUTPATIENT
Start: 2021-11-02

## 2021-11-04 ENCOUNTER — MEDICAL CORRESPONDENCE (OUTPATIENT)
Dept: HEALTH INFORMATION MANAGEMENT | Facility: CLINIC | Age: 61
End: 2021-11-04

## 2021-11-15 DIAGNOSIS — R11.15 NON-INTRACTABLE CYCLICAL VOMITING WITH NAUSEA: ICD-10-CM

## 2021-11-16 RX ORDER — ONDANSETRON 4 MG/1
4 TABLET, ORALLY DISINTEGRATING ORAL EVERY 8 HOURS PRN
Qty: 10 TABLET | Refills: 5 | OUTPATIENT
Start: 2021-11-16

## 2021-12-21 DIAGNOSIS — Z94.0 KIDNEY REPLACED BY TRANSPLANT: ICD-10-CM

## 2021-12-21 DIAGNOSIS — Z94.83 PANCREAS REPLACED BY TRANSPLANT (H): ICD-10-CM

## 2021-12-21 RX ORDER — SODIUM BICARBONATE 650 MG/1
TABLET ORAL
Qty: 180 TABLET | Refills: 3 | Status: SHIPPED | OUTPATIENT
Start: 2021-12-21 | End: 2022-04-19

## 2022-01-02 ENCOUNTER — HEALTH MAINTENANCE LETTER (OUTPATIENT)
Age: 62
End: 2022-01-02

## 2022-01-18 DIAGNOSIS — Z94.83 PANCREAS REPLACED BY TRANSPLANT (H): ICD-10-CM

## 2022-01-18 DIAGNOSIS — Z94.0 KIDNEY REPLACED BY TRANSPLANT: Primary | ICD-10-CM

## 2022-01-18 RX ORDER — TACROLIMUS 0.5 MG/1
0.5 CAPSULE, GELATIN COATED ORAL 2 TIMES DAILY
Qty: 60 CAPSULE | Refills: 0 | Status: SHIPPED | OUTPATIENT
Start: 2022-01-18 | End: 2022-02-15

## 2022-01-18 RX ORDER — TACROLIMUS 1 MG/1
1 CAPSULE, GELATIN COATED ORAL 2 TIMES DAILY
Qty: 60 CAPSULE | Refills: 0 | Status: SHIPPED | OUTPATIENT
Start: 2022-01-18 | End: 2022-02-15

## 2022-02-15 DIAGNOSIS — Z94.83 PANCREAS REPLACED BY TRANSPLANT (H): Primary | ICD-10-CM

## 2022-02-15 DIAGNOSIS — Z94.0 KIDNEY REPLACED BY TRANSPLANT: ICD-10-CM

## 2022-02-15 RX ORDER — TACROLIMUS 1 MG/1
1 CAPSULE, GELATIN COATED ORAL 2 TIMES DAILY
Qty: 60 CAPSULE | Refills: 0 | Status: SHIPPED | OUTPATIENT
Start: 2022-02-15 | End: 2022-03-15

## 2022-02-15 RX ORDER — TACROLIMUS 0.5 MG/1
0.5 CAPSULE, GELATIN COATED ORAL 2 TIMES DAILY
Qty: 60 CAPSULE | Refills: 0 | Status: SHIPPED | OUTPATIENT
Start: 2022-02-15 | End: 2022-03-15

## 2022-02-16 RX ORDER — VITAMIN B COMPLEX
25 TABLET ORAL DAILY
Qty: 90 TABLET | Refills: 4 | Status: SHIPPED | OUTPATIENT
Start: 2022-02-16 | End: 2023-02-28

## 2022-03-01 ENCOUNTER — TELEPHONE (OUTPATIENT)
Dept: INTERNAL MEDICINE | Facility: CLINIC | Age: 62
End: 2022-03-01
Payer: MEDICARE

## 2022-03-01 NOTE — TELEPHONE ENCOUNTER
Called [facility] and left a secure VM.  Turnaround for signed form is within 7 days.  Since it has been 2 weeks, please re-fax the walker order at our fax [phone]      Cesario Sy CMA ([facility]) at 3:25 PM on 3/1/2022

## 2022-03-01 NOTE — TELEPHONE ENCOUNTER
[facility] Call Center    Phone Message    May a detailed message be left on voicemail: yes     Reason for Call: Other: Rosalva from [facility] requesting call back to verify if the clinic recieved an order from [employer] for a walker, [facility]al[facility] stated that it should have been sent a couple weeks ago     Action Taken: Message routed to:  [facility]

## 2022-03-15 DIAGNOSIS — Z94.0 KIDNEY REPLACED BY TRANSPLANT: ICD-10-CM

## 2022-03-15 DIAGNOSIS — Z94.83 PANCREAS REPLACED BY TRANSPLANT (H): Primary | ICD-10-CM

## 2022-03-15 RX ORDER — TACROLIMUS 0.5 MG/1
0.5 CAPSULE, GELATIN COATED ORAL 2 TIMES DAILY
Qty: 60 CAPSULE | Refills: 0 | Status: SHIPPED | OUTPATIENT
Start: 2022-03-15 | End: 2022-04-19

## 2022-03-15 RX ORDER — TACROLIMUS 1 MG/1
1 CAPSULE, GELATIN COATED ORAL 2 TIMES DAILY
Qty: 60 CAPSULE | Refills: 0 | Status: SHIPPED | OUTPATIENT
Start: 2022-03-15 | End: 2022-04-19

## 2022-03-23 ENCOUNTER — TELEPHONE (OUTPATIENT)
Dept: TRANSPLANT | Facility: CLINIC | Age: 62
End: 2022-03-23
Payer: MEDICARE

## 2022-03-23 DIAGNOSIS — Z94.0 KIDNEY REPLACED BY TRANSPLANT: Primary | ICD-10-CM

## 2022-03-23 NOTE — TELEPHONE ENCOUNTER
Issue:  Overdue for transplant labs.  Overdue for annual nephrology appointment.  Due for follow up transplant ultra sound to follow up on hypoechoic focus seen on US done 8/5/21    Plan:  Call Jose Alfredo:  Request a full set of transplant labs, including a good drug level, be drawn in the next 1-2 weeks.  Educate the patient on the importance of lab compliance in monitoring the health of their transplant.  Advise he call [facility] to scheduled annual nephrology appointment and to schedule a renal transplant US (Orders placed with due date August 2021 under Dr. Lundy).    LPN Task:  Please call with above plan.  Thanks!

## 2022-04-19 DIAGNOSIS — Z94.83 PANCREAS REPLACED BY TRANSPLANT (H): ICD-10-CM

## 2022-04-19 DIAGNOSIS — Z94.0 KIDNEY REPLACED BY TRANSPLANT: ICD-10-CM

## 2022-04-19 RX ORDER — TACROLIMUS 1 MG/1
CAPSULE, GELATIN COATED ORAL
Qty: 60 CAPSULE | Refills: 0 | Status: SHIPPED | OUTPATIENT
Start: 2022-04-19 | End: 2022-05-17

## 2022-04-19 RX ORDER — TACROLIMUS 0.5 MG/1
CAPSULE, GELATIN COATED ORAL
Qty: 60 CAPSULE | Refills: 0 | Status: SHIPPED | OUTPATIENT
Start: 2022-04-19 | End: 2022-05-17

## 2022-04-19 RX ORDER — SODIUM BICARBONATE 650 MG/1
TABLET ORAL
Qty: 180 TABLET | Refills: 3 | Status: SHIPPED | OUTPATIENT
Start: 2022-04-19 | End: 2022-08-16

## 2022-04-24 ENCOUNTER — HEALTH MAINTENANCE LETTER (OUTPATIENT)
Age: 62
End: 2022-04-24

## 2022-05-12 ENCOUNTER — TELEPHONE (OUTPATIENT)
Dept: INTERNAL MEDICINE | Facility: CLINIC | Age: 62
End: 2022-05-12
Payer: MEDICARE

## 2022-05-12 DIAGNOSIS — Z94.0 KIDNEY TRANSPLANTED: Primary | ICD-10-CM

## 2022-05-12 NOTE — TELEPHONE ENCOUNTER
Jose Alfredo Tomlinson reports COVID exposure with girlfriend 5/10. He does not live with girlfriend and she is now isolating. He denies any COVID symptoms right now. RNCC recommend he test 5-7 days after exposure. Jose Alfredo hines/odalis. He will test with home kit fist and schedule PCR next week. Order placed. Chang hines/odalis to call [facility] if home test if positive.

## 2022-05-12 NOTE — TELEPHONE ENCOUNTER
[facility] Call Center    Phone Message    May a detailed message be left on voicemail: yes     Reason for Call: Symptoms or Concerns     Current symptom or concern: Patient was with his girlfriend all day on Tuesday 05/10/2022. Patient's girlfriend tested positive today 05/12/2022. Patient has a total of 3 COVID shots. Patient said he feels fine. Transplant patient. Gave him his transplant coordinator, Avril's PH#. Wondering what he should do.    Symptoms have been present for: N/A    Has patient previously been seen for this? No    Are there any new or worsening symptoms? No    Requesting a call back to discuss.    Action Taken: Message routed to:  [facility]    Travel Screening: Not Applicable

## 2022-05-17 DIAGNOSIS — Z94.0 KIDNEY REPLACED BY TRANSPLANT: ICD-10-CM

## 2022-05-17 DIAGNOSIS — Z94.83 PANCREAS REPLACED BY TRANSPLANT (H): Primary | ICD-10-CM

## 2022-05-17 RX ORDER — TACROLIMUS 1 MG/1
1 CAPSULE, GELATIN COATED ORAL 2 TIMES DAILY
Qty: 60 CAPSULE | Refills: 0 | Status: SHIPPED | OUTPATIENT
Start: 2022-05-17 | End: 2022-07-12

## 2022-05-17 RX ORDER — TACROLIMUS 0.5 MG/1
0.5 CAPSULE, GELATIN COATED ORAL 2 TIMES DAILY
Qty: 60 CAPSULE | Refills: 0 | Status: SHIPPED | OUTPATIENT
Start: 2022-05-17 | End: 2022-07-12

## 2022-07-11 ENCOUNTER — TELEPHONE (OUTPATIENT)
Dept: TRANSPLANT | Facility: CLINIC | Age: 62
End: 2022-07-11

## 2022-07-11 NOTE — LETTER
OUTPATIENT LABORATORY TEST ORDER    Patient Name: Jose Alfredo Tomlinson  Transplant Date: 10/29/2007   YOB: 1960  Issue Date & Time:7/12/2022  7:07 AM  [facility] MR: [id number] Exp. Date (1 year after date issued)      Diagnoses: Kidney Transplant (ICD-10  Z94.0)   Long term use of medications (ICD-10  Z79.899)     Lab results to be available on the same day drawn.   Patient should release information to the [facility].  Please fax to the Transplant Center at [phone].    Monthly   ?Hemogram and Platelet  ?Basic Metabolic Panel (Sodium, Potassium, Chloride, CO2, Creatinine, Urea Nitrogen,     Glucose,   Calcium)         ?/Tacrolimus/Prograf drug level                            Every 6 months                                          ?Urine for protein/creatinine       If you have any questions, please call [facility] at [phone] or [phone].    Please fax labs to [phone]    .

## 2022-07-11 NOTE — TELEPHONE ENCOUNTER
Issue:  Overdue for transplant labs.  Upcoming nephrology appointment on 8/2/22.    Plan:  Call [facility]:  Request a full set of transplant labs, including a good drug level, be drawn prior to 8/02/22 nephrology appointment.  Educate the patient on the importance of lab compliance in monitoring the health of their transplant.    LPN Task:  Please call with above plan.  Thanks!

## 2022-07-12 DIAGNOSIS — Z94.83 PANCREAS TRANSPLANTED (H): ICD-10-CM

## 2022-07-12 DIAGNOSIS — Z94.83 PANCREAS REPLACED BY TRANSPLANT (H): ICD-10-CM

## 2022-07-12 DIAGNOSIS — K21.9 ESOPHAGEAL REFLUX: ICD-10-CM

## 2022-07-12 DIAGNOSIS — G62.9 PERIPHERAL NEUROPATHY: ICD-10-CM

## 2022-07-12 DIAGNOSIS — Z94.0 S/P KIDNEY TRANSPLANT: Primary | ICD-10-CM

## 2022-07-12 DIAGNOSIS — Z94.0 KIDNEY REPLACED BY TRANSPLANT: Primary | ICD-10-CM

## 2022-07-12 RX ORDER — TACROLIMUS 0.5 MG/1
0.5 CAPSULE, GELATIN COATED ORAL 2 TIMES DAILY
Qty: 60 CAPSULE | Refills: 0 | Status: SHIPPED | OUTPATIENT
Start: 2022-07-12 | End: 2022-08-02

## 2022-07-12 RX ORDER — TACROLIMUS 1 MG/1
1 CAPSULE, GELATIN COATED ORAL 2 TIMES DAILY
Qty: 60 CAPSULE | Refills: 0 | Status: SHIPPED | OUTPATIENT
Start: 2022-07-12 | End: 2022-08-02

## 2022-07-15 RX ORDER — ASCORBIC ACID 500 MG
1000 TABLET ORAL DAILY
Qty: 180 TABLET | Refills: 1 | Status: SHIPPED | OUTPATIENT
Start: 2022-07-15 | End: 2023-01-27

## 2022-08-01 ENCOUNTER — ANCILLARY PROCEDURE (OUTPATIENT)
Dept: ULTRASOUND IMAGING | Facility: CLINIC | Age: 62
End: 2022-08-01
Attending: INTERNAL MEDICINE
Payer: MEDICARE

## 2022-08-01 DIAGNOSIS — R93.429 ABNORMAL ULTRASOUND OF KIDNEY: ICD-10-CM

## 2022-08-01 DIAGNOSIS — Z94.0 KIDNEY REPLACED BY TRANSPLANT: ICD-10-CM

## 2022-08-01 DIAGNOSIS — Z48.298 AFTERCARE FOLLOWING ORGAN TRANSPLANT: ICD-10-CM

## 2022-08-01 PROCEDURE — 76776 US EXAM K TRANSPL W/DOPPLER: CPT | Performed by: RADIOLOGY

## 2022-08-02 ENCOUNTER — VIRTUAL VISIT (OUTPATIENT)
Dept: NEPHROLOGY | Facility: CLINIC | Age: 62
End: 2022-08-02
Attending: INTERNAL MEDICINE
Payer: MEDICARE

## 2022-08-02 DIAGNOSIS — Z94.0 HTN, KIDNEY TRANSPLANT RELATED: ICD-10-CM

## 2022-08-02 DIAGNOSIS — Z94.0 KIDNEY REPLACED BY TRANSPLANT: Primary | ICD-10-CM

## 2022-08-02 DIAGNOSIS — Z94.83 PANCREAS REPLACED BY TRANSPLANT (H): ICD-10-CM

## 2022-08-02 DIAGNOSIS — I15.1 HTN, KIDNEY TRANSPLANT RELATED: ICD-10-CM

## 2022-08-02 DIAGNOSIS — D84.9 IMMUNOSUPPRESSION (H): ICD-10-CM

## 2022-08-02 PROBLEM — M79.672 LEFT FOOT PAIN: Status: RESOLVED | Noted: 2017-12-20 | Resolved: 2022-08-02

## 2022-08-02 PROBLEM — M79.89 SWELLING OF LIMB: Status: RESOLVED | Noted: 2017-12-20 | Resolved: 2022-08-02

## 2022-08-02 PROBLEM — M79.671 RIGHT FOOT PAIN: Status: RESOLVED | Noted: 2017-12-20 | Resolved: 2022-08-02

## 2022-08-02 PROCEDURE — 99442 PR PHYSICIAN TELEPHONE EVALUATION 11-20 MIN: CPT | Mod: 95 | Performed by: INTERNAL MEDICINE

## 2022-08-02 RX ORDER — TACROLIMUS 1 MG/1
1 CAPSULE, GELATIN COATED ORAL 2 TIMES DAILY
Qty: 60 CAPSULE | Refills: 0 | COMMUNITY
Start: 2022-08-02 | End: 2022-08-16

## 2022-08-02 RX ORDER — TACROLIMUS 0.5 MG/1
0.5 CAPSULE, GELATIN COATED ORAL 2 TIMES DAILY
Qty: 60 CAPSULE | Refills: 0 | COMMUNITY
Start: 2022-08-02 | End: 2022-08-16

## 2022-08-02 NOTE — LETTER
"8/2/2022       RE: Jose Alfredo Tomlinson  [address] [place] [address] [address]  [place] [address]     Dear Colleague,    Thank you for referring your patient, Jose Alfredo Tomlinson, to the [facility] [place] at [facility]. Please see a copy of my visit note below.    Jose Alfredo is a 62 year old who is being evaluated via a billable telephone visit.      What phone number would you like to be contacted at? [phone]  How would you like to obtain your AVS? [place]  Phone call duration: 20 minutes      TRANSPLANT NEPHROLOGY CHRONIC POST TRANSPLANT VISIT    Assessment & Plan   # DDKT: Unknown, as labs have not been done recently   - Baseline Creatinine:  ~ 0.9-1   - Proteinuria: Moderate (1-3 grams), repeat now    - Date DSA Last Checked: Dec/2020      Latest DSA: No   - BK Viremia: No   - Kidney Tx Biopsy: No     # Pancreas Tx (SABINE):    - Pancreatic Exocrine Drainage: Enteric drained     - Blood glucose: Euglycemia      On insulin: No   - HbA1c: Stable      Latest HbA1c: 5.1%, repeat    - Pancreatic enzymes: Stable   - Date DSA Last Checked: Dec/2020  Latest DSA: No   - Pancreas Tx Biopsy: No    # Immunosuppression: Tacrolimus immediate release (goal 5-8) and Mycophenolate mofetil (dose 500 mg every 12 hours)   - Continue with intensive monitoring of immunosuppression for efficacy and toxicity.   - Changes: Not at this time    # Infection Prophylaxis:   - PJP: None. CD4 420 in 2/2021    # Hypertension: Borderline control;  Goal BP: < 130/80   - Changes: Not at this time. Previously was on lisinopril 40mg daily in 1/2021 but held 2/2 KAUR. He states his BP is \"well controlled\" but doesn't have values.     # Relative Erythrocytosis: Hgb: Stable;  On ACEI/ARB: No  Imaging: Not at this time      # Mineral Bone Disorder:   - Secondary renal hyperparathyroidism; PTH level: Normal (18-80 pg/ml)        On treatment: None  - Vitamin D; level: Low        On " supplement: Yes  - Calcium; level: Normal        On supplement: No  - Phosphorus; level: Normal        On supplement: No    # Electrolytes:   - Potassium; level: Normal        On supplement: No  - Magnesium; level: Normal        On supplement: No  - Bicarbonate; level: Normal        On supplement: Yes, bicarb 1300mg bid     # Skin Cancer Risk:    - Discussed sun protection and recommend regular follow up with Dermatology.    # Medical Compliance: No.  Evidence of infrequent transplant follow-up.  - Discussed importance of checking labs regularly as recommended, taking medications as prescribed and attending scheduled medical appointments.    # Kidney transplant lesion:   -Increased size on 8/1/22 U/S. Repeat U/S in 1 year    # COVID-19 Virus Review: Discussed COVID-19 virus and the potential medical risks.  Reviewed preventative health recommendations, including wearing a mask where appropriate.  Recommended COVID vaccination should be up to date with either an initial vaccination or booster shot when appropriate.  Asked the patient to inform the transplant center if they are exposed or diagnosed with this virus.    # COVID Vaccination Up To Date: No, due for next dose    # Transplant History:  Etiology of Kidney Failure: Diabetes mellitus type 1  Tx: DDKT and SABINE  Transplant: 10/29/2007 (Pancreas), 3/28/2007 (Kidney)  Significant changes in immunosuppression: None  Significant transplant-related complications: None    Transplant Office Phone Number: [phone]    Assessment and plan was discussed with the patient and he voiced his understanding and agreement.    Return visit: Return in about 1 year (around 8/2/2023) for in person visit.    Alek Jaquez MD    Chief Complaint   Mr. Tomlinson is a 62 year old here for routine follow up, kidney transplant, pancreas transplant and immunosuppression management.    History of Present Illness   The patient overall feels well. He denies any recent hospitalizations. He  "denies nausea, vomiting, diarrhea, fever, chills, shortness of breath, chest pain, LE edema, unintentional weight loss, nights sweats, dysuria, hematuria.     He doesn't know what his BP is and states that he only takes it if it \"feels low\".       Home BP: Doesn't know    Problem List   Patient Active Problem List   Diagnosis     Pancreas transplanted (H)     S/P kidney transplant     History of diabetes mellitus, type I     Hyperlipidemia     Peripheral neuropathy     PAD (peripheral artery disease) (H)     Skin exam, screening for cancer     Seborrheic keratosis     Acquired perforating dermatosis     Cellulitis     Atrial bigeminy     Kidney replaced by transplant     Pancreas replaced by transplant (H)     Benign essential hypertension     Diabetic neuropathy, type I diabetes mellitus (H)     Right foot pain     Left foot pain     Tactile anesthesia     Personal history of diabetic foot ulcer     Right foot drop     Wrist drop, bilateral     Swelling of limb     Immunosuppression (H)       Allergies   No Known Allergies    Medications   Current Outpatient Medications   Medication Sig     aspirin (ASPIRIN LOW DOSE) 81 MG EC tablet Take 1 tablet (81 mg) by mouth daily     omeprazole (PRILOSEC) 20 MG DR capsule Take 1 capsule (20 mg) by mouth 2 times daily     vitamin C (ASCORBIC ACID) 500 MG tablet Take 2 tablets (1,000 mg) by mouth daily     atorvastatin (LIPITOR) 10 MG tablet Take 1 tablet (10 mg) by mouth daily     CELLCEPT (BRAND) 500 MG tablet Take 1 tablet (500 mg) by mouth 2 times daily     ondansetron (ZOFRAN-ODT) 4 MG ODT tab Place 1 tablet (4 mg) under the tongue every 8 hours as needed for nausea     order for DME Equipment being ordered: therapeutic shoes and insoles. For Peripheral neuropathy, diabetes type 1 and poor circulation     PROGRAF (BRAND) 0.5 MG capsule Take 1 capsule (0.5 mg) by mouth 2 times daily     PROGRAF (BRAND) 1 MG capsule Take 1 capsule (1 mg) by mouth 2 times daily     sodium " bicarbonate 650 MG tablet TAKE THREE TABLETS BY MOUTH TWICE A DAY     vitamin C (ASCORBIC ACID) 500 MG tablet TAKE TWO TABLETS BY MOUTH ONCE DAILY     Vitamin D3 (CHOLECALCIFEROL) 25 mcg (1000 units) tablet Take 1 tablet (25 mcg) by mouth daily     Current Facility-Administered Medications   Medication     lidocaine 1% with EPINEPHrine 1:100,000 injection 3 mL     There are no discontinued medications.    Physical Exam   Vital Signs: There were no vitals taken for this visit.    Physical exam was deferred for this telemedicine visit.      Data     Renal Latest Ref Rng & Units 10/3/2021 9/20/2021 3/6/2021   Na 133 - 144 mmol/L 141 141 143   K 3.4 - 5.3 mmol/L 4.1 4.9 5.1   Cl 94 - 109 mmol/L 112(H) 116(H) 116(H)   CO2 20 - 32 mmol/L 24 21 21   BUN 7 - 30 mg/dL 25 28 31(H)   Cr 0.66 - 1.25 mg/dL 1.00 1.05 0.92   Glucose 70 - 99 mg/dL 100(H) 104(H) 92   Ca  8.5 - 10.1 mg/dL 9.0 9.2 8.3(L)   Mg 1.6 - 2.3 mg/dL - - -     Bone Health Latest Ref Rng & Units 12/22/2020 11/14/2010 11/13/2010   Phos 2.5 - 4.5 mg/dL - 4.1 3.7   PTHi 12 - 72 pg/mL - - -   Vit D Def 20 - 75 ug/L 18(L) - -     Heme Latest Ref Rng & Units 10/3/2021 9/20/2021 2/23/2021   WBC 4.0 - 11.0 10e3/uL 7.4 6.5 5.8   Hgb 13.3 - 17.7 g/dL 14.1 14.4 11.5(L)   Plt 150 - 450 10e3/uL 202 182 186   ABSOLUTE NEUTROPHIL 1.6 - 8.3 10e9/L - - -   ABSOLUTE LYMPHOCYTES 0.8 - 5.3 10e9/L - - -   ABSOLUTE MONOCYTES 0.0 - 1.3 10e9/L - - -   ABSOLUTE EOSINOPHILS 0.0 - 0.7 10e9/L - - -   ABSOLUTE BASOPHILS 0.0 - 0.2 10e9/L - - -   ABS IMMATURE GRANULOCYTES 0 - 0.4 10e9/L - - -   ABSOLUTE NUCLEATED RBC - - - -     Liver Latest Ref Rng & Units 10/3/2021 9/20/2021 12/22/2020   AP 40 - 150 U/L 93 82 88   TBili 0.2 - 1.3 mg/dL 0.4 - 0.3   ALT 0 - 70 U/L 16 22 17   AST 0 - 45 U/L 12 30 10   Tot Protein 6.8 - 8.8 g/dL 6.8 6.7(L) 6.2(L)   Albumin 3.4 - 5.0 g/dL 3.5 3.6 3.6     Pancreas Latest Ref Rng & Units 9/20/2021 3/6/2021 2/23/2021   A1C 0.0 - 5.6 % 5.1 - -   Amylase 30 - 110  U/L 59 59 56   Lipase 73 - 393 U/L 113 124 86     Iron studies Latest Ref Rng & Units 1/25/2016 11/16/2007 3/29/2007   Iron 35 - 180 ug/dL 85 38 22(L)   Iron sat 15 - 46 % 27 - -   Ferritin 26 - 388 ng/mL 53 368(H) 726(H)     [facility] Txp Virology Latest Ref Rng & Units 12/22/2020 3/3/2015 11/12/2010   CMV IgG EU/mL - - -   CVM DNA Quant - - - Whole blood, EDTA anticoagulant   CMV Quant <100 Copies/mL - - <100   CMV QT Log <2.0 Log copies/mL - - <2.0   BK Spec - Plasma Plasma -   BK Res BKNEG:BK Virus DNA Not Detected copies/mL BK Virus DNA Not Detected BK Virus DNA Not Detected -   BK Log <2.7 Log copies/mL Not Calculated Not Calculated   The Real-Time quantitative BK Virus assay was developed and its performance   characteristics determined by the Infectious Diseases Diagnostic Laboratory at   the [facility] in [place], [place]. The   primers and probes for each analyte are Analyte Specific Reagents (ASRs)   manufactured by [employer].   ASRs are used in many laboratory tests necessary for standard medical care and   generally do not require U.S. Food and Drug Administration approval. The FDA   has determined that such clearance or approval is not necessary.   This test is used for clinical purposes. It should not be regarded as   investigational or for research. This laboratory is certified under the   Clinical Laboratory Improvement Amendments of 1988 (CLIA-88) as qualified to   perform high complexity clinical laboratory testing.   -   EBV IgG - - - -   EBV DNA COPIES/ML <1000 Copies/mL - - -   EBV DNA LOG OF COPIES <3.0 Log copies/mL - - -   Hep B Core NEG - - -   Hep B Surf 0.0 - 4.9 mIU/mL - - -   HIV 1&2 NEG - - -        Recent Labs   Lab Test 02/02/21  0652 02/23/21  0907 03/06/21  0858   DOSTAC 2,000 2/22/2021 1900 700pm 3/5/21   TACROL 5.0 4.3* 4.9*           Again, thank you for allowing me to participate in the care of your patient.      Sincerely,    Alek Jaquez MD

## 2022-08-02 NOTE — PATIENT INSTRUCTIONS
Patient Recommendations:  - Labs every 2 months    Transplant Patient Information  Your Post Transplant Coordinator is: Avril Lancaster  You and your care team can contact your transplant coordinator Monday - Friday, 8am - 5pm at [phone] (Option 2 to reach the coordinator or Option 4 to schedule an appointment).  You can also reach your care team online via [employer].  After hours for urgent matters, please call [facility] at [phone].

## 2022-08-02 NOTE — PROGRESS NOTES
"Jose Alfredo is a 62 year old who is being evaluated via a billable telephone visit.      What phone number would you like to be contacted at? [phone]  How would you like to obtain your AVS? [place]  Phone call duration: 20 minutes      TRANSPLANT NEPHROLOGY CHRONIC POST TRANSPLANT VISIT    Assessment & Plan   # DDKT: Unknown, as labs have not been done recently   - Baseline Creatinine:  ~ 0.9-1   - Proteinuria: Moderate (1-3 grams), repeat now    - Date DSA Last Checked: Dec/2020      Latest DSA: No   - BK Viremia: No   - Kidney Tx Biopsy: No     # Pancreas Tx (SABINE):    - Pancreatic Exocrine Drainage: Enteric drained     - Blood glucose: Euglycemia      On insulin: No   - HbA1c: Stable      Latest HbA1c: 5.1%, repeat    - Pancreatic enzymes: Stable   - Date DSA Last Checked: Dec/2020  Latest DSA: No   - Pancreas Tx Biopsy: No    # Immunosuppression: Tacrolimus immediate release (goal 5-8) and Mycophenolate mofetil (dose 500 mg every 12 hours)   - Continue with intensive monitoring of immunosuppression for efficacy and toxicity.   - Changes: Not at this time    # Infection Prophylaxis:   - PJP: None. CD4 420 in 2/2021    # Hypertension: Borderline control;  Goal BP: < 130/80   - Changes: Not at this time. Previously was on lisinopril 40mg daily in 1/2021 but held 2/2 KAUR. He states his BP is \"well controlled\" but doesn't have values.     # Relative Erythrocytosis: Hgb: Stable;  On ACEI/ARB: No  Imaging: Not at this time      # Mineral Bone Disorder:   - Secondary renal hyperparathyroidism; PTH level: Normal (18-80 pg/ml)        On treatment: None  - Vitamin D; level: Low        On supplement: Yes  - Calcium; level: Normal        On supplement: No  - Phosphorus; level: Normal        On supplement: No    # Electrolytes:   - Potassium; level: Normal        On supplement: No  - Magnesium; level: Normal        On supplement: No  - Bicarbonate; level: Normal        On supplement: Yes, bicarb 1300mg bid     # Skin Cancer " "Risk:    - Discussed sun protection and recommend regular follow up with Dermatology.    # Medical Compliance: No.  Evidence of infrequent transplant follow-up.  - Discussed importance of checking labs regularly as recommended, taking medications as prescribed and attending scheduled medical appointments.    # Kidney transplant lesion:   -Increased size on 8/1/22 U/S. Repeat U/S in 1 year    # COVID-19 Virus Review: Discussed COVID-19 virus and the potential medical risks.  Reviewed preventative health recommendations, including wearing a mask where appropriate.  Recommended COVID vaccination should be up to date with either an initial vaccination or booster shot when appropriate.  Asked the patient to inform the transplant center if they are exposed or diagnosed with this virus.    # COVID Vaccination Up To Date: No, due for next dose    # Transplant History:  Etiology of Kidney Failure: Diabetes mellitus type 1  Tx: DDKT and SABINE  Transplant: 10/29/2007 (Pancreas), 3/28/2007 (Kidney)  Significant changes in immunosuppression: None  Significant transplant-related complications: None    Transplant Office Phone Number: [phone]    Assessment and plan was discussed with the patient and he voiced his understanding and agreement.    Return visit: Return in about 1 year (around 8/2/2023) for in person visit.    Alek Jaquez MD    Chief Complaint   Mr. Tomlinson is a 62 year old here for routine follow up, kidney transplant, pancreas transplant and immunosuppression management.    History of Present Illness   The patient overall feels well. He denies any recent hospitalizations. He denies nausea, vomiting, diarrhea, fever, chills, shortness of breath, chest pain, LE edema, unintentional weight loss, nights sweats, dysuria, hematuria.     He doesn't know what his BP is and states that he only takes it if it \"feels low\".       Home BP: Doesn't know    Problem List   Patient Active Problem List   Diagnosis     Pancreas " transplanted (H)     S/P kidney transplant     History of diabetes mellitus, type I     Hyperlipidemia     Peripheral neuropathy     PAD (peripheral artery disease) (H)     Skin exam, screening for cancer     Seborrheic keratosis     Acquired perforating dermatosis     Cellulitis     Atrial bigeminy     Kidney replaced by transplant     Pancreas replaced by transplant (H)     Benign essential hypertension     Diabetic neuropathy, type I diabetes mellitus (H)     Right foot pain     Left foot pain     Tactile anesthesia     Personal history of diabetic foot ulcer     Right foot drop     Wrist drop, bilateral     Swelling of limb     Immunosuppression (H)       Allergies   No Known Allergies    Medications   Current Outpatient Medications   Medication Sig     aspirin (ASPIRIN LOW DOSE) 81 MG EC tablet Take 1 tablet (81 mg) by mouth daily     omeprazole (PRILOSEC) 20 MG DR capsule Take 1 capsule (20 mg) by mouth 2 times daily     vitamin C (ASCORBIC ACID) 500 MG tablet Take 2 tablets (1,000 mg) by mouth daily     atorvastatin (LIPITOR) 10 MG tablet Take 1 tablet (10 mg) by mouth daily     CELLCEPT (BRAND) 500 MG tablet Take 1 tablet (500 mg) by mouth 2 times daily     ondansetron (ZOFRAN-ODT) 4 MG ODT tab Place 1 tablet (4 mg) under the tongue every 8 hours as needed for nausea     order for DME Equipment being ordered: therapeutic shoes and insoles. For Peripheral neuropathy, diabetes type 1 and poor circulation     PROGRAF (BRAND) 0.5 MG capsule Take 1 capsule (0.5 mg) by mouth 2 times daily     PROGRAF (BRAND) 1 MG capsule Take 1 capsule (1 mg) by mouth 2 times daily     sodium bicarbonate 650 MG tablet TAKE THREE TABLETS BY MOUTH TWICE A DAY     vitamin C (ASCORBIC ACID) 500 MG tablet TAKE TWO TABLETS BY MOUTH ONCE DAILY     Vitamin D3 (CHOLECALCIFEROL) 25 mcg (1000 units) tablet Take 1 tablet (25 mcg) by mouth daily     Current Facility-Administered Medications   Medication     lidocaine 1% with EPINEPHrine  1:100,000 injection 3 mL     There are no discontinued medications.    Physical Exam   Vital Signs: There were no vitals taken for this visit.    Physical exam was deferred for this telemedicine visit.      Data     Renal Latest Ref Rng & Units 10/3/2021 9/20/2021 3/6/2021   Na 133 - 144 mmol/L 141 141 143   K 3.4 - 5.3 mmol/L 4.1 4.9 5.1   Cl 94 - 109 mmol/L 112(H) 116(H) 116(H)   CO2 20 - 32 mmol/L 24 21 21   BUN 7 - 30 mg/dL 25 28 31(H)   Cr 0.66 - 1.25 mg/dL 1.00 1.05 0.92   Glucose 70 - 99 mg/dL 100(H) 104(H) 92   Ca  8.5 - 10.1 mg/dL 9.0 9.2 8.3(L)   Mg 1.6 - 2.3 mg/dL - - -     Bone Health Latest Ref Rng & Units 12/22/2020 11/14/2010 11/13/2010   Phos 2.5 - 4.5 mg/dL - 4.1 3.7   PTHi 12 - 72 pg/mL - - -   Vit D Def 20 - 75 ug/L 18(L) - -     Heme Latest Ref Rng & Units 10/3/2021 9/20/2021 2/23/2021   WBC 4.0 - 11.0 10e3/uL 7.4 6.5 5.8   Hgb 13.3 - 17.7 g/dL 14.1 14.4 11.5(L)   Plt 150 - 450 10e3/uL 202 182 186   ABSOLUTE NEUTROPHIL 1.6 - 8.3 10e9/L - - -   ABSOLUTE LYMPHOCYTES 0.8 - 5.3 10e9/L - - -   ABSOLUTE MONOCYTES 0.0 - 1.3 10e9/L - - -   ABSOLUTE EOSINOPHILS 0.0 - 0.7 10e9/L - - -   ABSOLUTE BASOPHILS 0.0 - 0.2 10e9/L - - -   ABS IMMATURE GRANULOCYTES 0 - 0.4 10e9/L - - -   ABSOLUTE NUCLEATED RBC - - - -     Liver Latest Ref Rng & Units 10/3/2021 9/20/2021 12/22/2020   AP 40 - 150 U/L 93 82 88   TBili 0.2 - 1.3 mg/dL 0.4 - 0.3   ALT 0 - 70 U/L 16 22 17   AST 0 - 45 U/L 12 30 10   Tot Protein 6.8 - 8.8 g/dL 6.8 6.7(L) 6.2(L)   Albumin 3.4 - 5.0 g/dL 3.5 3.6 3.6     Pancreas Latest Ref Rng & Units 9/20/2021 3/6/2021 2/23/2021   A1C 0.0 - 5.6 % 5.1 - -   Amylase 30 - 110 U/L 59 59 56   Lipase 73 - 393 U/L 113 124 86     Iron studies Latest Ref Rng & Units 1/25/2016 11/16/2007 3/29/2007   Iron 35 - 180 ug/dL 85 38 22(L)   Iron sat 15 - 46 % 27 - -   Ferritin 26 - 388 ng/mL 53 368(H) 726(H)     [facility] Txp Virology Latest Ref Rng & Units 12/22/2020 3/3/2015 11/12/2010   CMV IgG EU/mL - - -   CVM DNA Quant - - -  Whole blood, EDTA anticoagulant   CMV Quant <100 Copies/mL - - <100   CMV QT Log <2.0 Log copies/mL - - <2.0   BK Spec - Plasma Plasma -   BK Res BKNEG:BK Virus DNA Not Detected copies/mL BK Virus DNA Not Detected BK Virus DNA Not Detected -   BK Log <2.7 Log copies/mL Not Calculated Not Calculated   The Real-Time quantitative BK Virus assay was developed and its performance   characteristics determined by the Infectious Diseases Diagnostic Laboratory at   the [facility] in [place], [place]. The   primers and probes for each analyte are Analyte Specific Reagents (ASRs)   manufactured by [employer].   ASRs are used in many laboratory tests necessary for standard medical care and   generally do not require U.S. Food and Drug Administration approval. The FDA   has determined that such clearance or approval is not necessary.   This test is used for clinical purposes. It should not be regarded as   investigational or for research. This laboratory is certified under the   Clinical Laboratory Improvement Amendments of 1988 (CLIA-88) as qualified to   perform high complexity clinical laboratory testing.   -   EBV IgG - - - -   EBV DNA COPIES/ML <1000 Copies/mL - - -   EBV DNA LOG OF COPIES <3.0 Log copies/mL - - -   Hep B Core NEG - - -   Hep B Surf 0.0 - 4.9 mIU/mL - - -   HIV 1&2 NEG - - -        Recent Labs   Lab Test 02/02/21  0652 02/23/21  0907 03/06/21  0858   DOSTAC 2,000 2/22/2021 1900 700pm 3/5/21   TACROL 5.0 4.3* 4.9*

## 2022-08-03 ENCOUNTER — TELEPHONE (OUTPATIENT)
Dept: TRANSPLANT | Facility: CLINIC | Age: 62
End: 2022-08-03

## 2022-08-03 DIAGNOSIS — R93.429 ABNORMAL ULTRASOUND OF KIDNEY: ICD-10-CM

## 2022-08-03 DIAGNOSIS — Z48.298 AFTERCARE FOLLOWING ORGAN TRANSPLANT: ICD-10-CM

## 2022-08-03 DIAGNOSIS — Z94.0 KIDNEY REPLACED BY TRANSPLANT: Primary | ICD-10-CM

## 2022-08-03 DIAGNOSIS — Z79.899 OTHER LONG TERM (CURRENT) DRUG THERAPY: ICD-10-CM

## 2022-08-03 DIAGNOSIS — Z20.828 CONTACT WITH AND (SUSPECTED) EXPOSURE TO OTHER VIRAL COMMUNICABLE DISEASES: ICD-10-CM

## 2022-08-03 DIAGNOSIS — Z94.83 PANCREAS REPLACED BY TRANSPLANT (H): ICD-10-CM

## 2022-08-03 NOTE — TELEPHONE ENCOUNTER
"Alek [place] MD Harris, Kathleen, RN  Very difficult visit. He is not capable of doing virtual visits. He needs repeat labs and says that he will get them next week. With these labs please obtain UPCR, HbA1c. Repeat U/S in 1 year. Needs labs every 2 months. When you get his labs back please make him tell you his BP. He doesn't know, was previously on lisinopril and this was stopped for KAUR but never restarted. He says that he only checks BP when it \"feels low\".     Thanks,   Alek    1) Labs in the next week and every 2 months after that.  2) Follow up renal transplant US in 1 year. Can call [phone] to schedule at any time.  3) Check BP's 2-3 times a week and record them. Should check even if it doesn't feel low.        LPN task:  Please call with above plan. Lab orders are up to date.  Thanks!  "

## 2022-08-04 ENCOUNTER — TELEPHONE (OUTPATIENT)
Dept: NEPHROLOGY | Facility: CLINIC | Age: 62
End: 2022-08-04

## 2022-08-04 NOTE — TELEPHONE ENCOUNTER
Sent recall letter for 1 year follow up due to there not being a schedule template available at this time.

## 2022-08-16 DIAGNOSIS — Z94.83 PANCREAS REPLACED BY TRANSPLANT (H): ICD-10-CM

## 2022-08-16 DIAGNOSIS — Z94.0 KIDNEY REPLACED BY TRANSPLANT: Primary | ICD-10-CM

## 2022-08-16 RX ORDER — TACROLIMUS 1 MG/1
1 CAPSULE, GELATIN COATED ORAL 2 TIMES DAILY
Qty: 60 CAPSULE | Refills: 0 | Status: SHIPPED | OUTPATIENT
Start: 2022-08-16 | End: 2022-09-13

## 2022-08-16 RX ORDER — SODIUM BICARBONATE 650 MG/1
1950 TABLET ORAL 2 TIMES DAILY
Qty: 180 TABLET | Refills: 0 | Status: SHIPPED | OUTPATIENT
Start: 2022-08-16 | End: 2022-09-13

## 2022-08-16 RX ORDER — TACROLIMUS 0.5 MG/1
0.5 CAPSULE, GELATIN COATED ORAL 2 TIMES DAILY
Qty: 60 CAPSULE | Refills: 0 | Status: SHIPPED | OUTPATIENT
Start: 2022-08-16 | End: 2022-09-13

## 2022-09-08 ENCOUNTER — TELEPHONE (OUTPATIENT)
Dept: TRANSPLANT | Facility: CLINIC | Age: 62
End: 2022-09-08

## 2022-09-08 DIAGNOSIS — Z94.83 PANCREAS REPLACED BY TRANSPLANT (H): ICD-10-CM

## 2022-09-08 DIAGNOSIS — Z94.0 KIDNEY REPLACED BY TRANSPLANT: Primary | ICD-10-CM

## 2022-09-08 DIAGNOSIS — R73.09 OTHER ABNORMAL GLUCOSE: ICD-10-CM

## 2022-09-08 NOTE — TELEPHONE ENCOUNTER
Issue:  Overdue for transplant labs.  Last set from Oct of 2021.    Plan:  Call [facility]:  Request a full set of transplant labs, including a good drug level, be drawn in the next 1-2 weeks, and every other month after that.  Educate the patient on the importance of lab compliance in monitoring the health of their transplant.    LPN Task:  Please call with above plan.  Thanks!

## 2022-09-08 NOTE — LETTER
Jose Alfredo Tomlinson  [address] [place] [address] [address]  [place] [address]                September 8, 2022    Dear Jose Alfredo,    This letter is regarding your transplant lab work. The most recent laboratory results we have on file for you are from 10/3/2021. Your labs should be completed monthly.   For the best outcome for your transplant and in order for us to refill your prescriptions, we encourage you to have a yearly clinic appointment with a physician and to have current labs. If you are unable to return to our transplant center there are several alternatives. Please call your coordinator to discuss them.  To make an appointment with a physician here at [facility], please call:  The Transplant Office at [phone] or [phone].     The Transplant Staff at [facility]

## 2022-09-08 NOTE — LETTER
OUTPATIENT LABORATORY TEST ORDER    Patient Name: Jose Alfredo Tomlinson  Transplant Date: 10/29/2007   YOB: 1960  Issue Date & Time:9/8/2022  8:55 AM  [facility] MR: [id number] Exp. Date (1 year after date issued)      Diagnoses: Kidney Transplant (ICD-10  Z94.0)   Long term use of medications (ICD-10  Z79.899)     Lab results to be available on the same day drawn.   Patient should release information to the [facility].  Please fax to the Transplant Center at [phone].    Monthly   ?Hemogram and Platelet  ?Basic Metabolic Panel (Sodium, Potassium, Chloride, CO2, Creatinine, Urea Nitrogen,     Glucose,   Calcium)         ?/Tacrolimus/Prograf drug level                            Every 6 months                                          ?Urine for protein/creatinine       If you have any questions, please call The Transplant Center at [phone] or [phone].    Please fax labs to [phone]    .

## 2022-09-08 NOTE — TELEPHONE ENCOUNTER
Left message for patient and sent current lab letter with return to lab letter attached via [employer].

## 2022-09-13 DIAGNOSIS — Z94.0 KIDNEY REPLACED BY TRANSPLANT: ICD-10-CM

## 2022-09-13 DIAGNOSIS — Z94.83 PANCREAS REPLACED BY TRANSPLANT (H): ICD-10-CM

## 2022-09-13 RX ORDER — TACROLIMUS 1 MG/1
CAPSULE, GELATIN COATED ORAL
Qty: 60 CAPSULE | Refills: 0 | Status: SHIPPED | OUTPATIENT
Start: 2022-09-13 | End: 2022-10-11

## 2022-09-13 RX ORDER — TACROLIMUS 0.5 MG/1
CAPSULE, GELATIN COATED ORAL
Qty: 60 CAPSULE | Refills: 0 | Status: SHIPPED | OUTPATIENT
Start: 2022-09-13 | End: 2022-10-11

## 2022-09-13 RX ORDER — SODIUM BICARBONATE 650 MG/1
TABLET ORAL
Qty: 180 TABLET | Refills: 0 | Status: SHIPPED | OUTPATIENT
Start: 2022-09-13 | End: 2022-10-11

## 2022-09-14 DIAGNOSIS — E78.00 HIGH CHOLESTEROL: ICD-10-CM

## 2022-09-14 DIAGNOSIS — Z94.0 KIDNEY TRANSPLANTED: ICD-10-CM

## 2022-09-14 DIAGNOSIS — Z94.83 PANCREAS TRANSPLANTED (H): ICD-10-CM

## 2022-09-15 RX ORDER — ATORVASTATIN CALCIUM 10 MG/1
10 TABLET, FILM COATED ORAL DAILY
Qty: 90 TABLET | Refills: 0 | Status: SHIPPED | OUTPATIENT
Start: 2022-09-15 | End: 2022-11-14

## 2022-09-28 ENCOUNTER — OFFICE VISIT (OUTPATIENT)
Dept: DERMATOLOGY | Facility: CLINIC | Age: 62
End: 2022-09-28
Payer: MEDICARE

## 2022-09-28 DIAGNOSIS — Z94.0 KIDNEY REPLACED BY TRANSPLANT: ICD-10-CM

## 2022-09-28 DIAGNOSIS — L82.1 SEBORRHEIC KERATOSIS: ICD-10-CM

## 2022-09-28 DIAGNOSIS — D22.9 MULTIPLE BENIGN NEVI: ICD-10-CM

## 2022-09-28 DIAGNOSIS — D18.01 CHERRY ANGIOMA: ICD-10-CM

## 2022-09-28 DIAGNOSIS — Z94.83 PANCREAS REPLACED BY TRANSPLANT (H): ICD-10-CM

## 2022-09-28 DIAGNOSIS — D49.2 NEOPLASM OF UNSPECIFIED BEHAVIOR OF BONE, SOFT TISSUE, AND SKIN: Primary | ICD-10-CM

## 2022-09-28 DIAGNOSIS — L81.4 SOLAR LENTIGO: ICD-10-CM

## 2022-09-28 PROCEDURE — 88305 TISSUE EXAM BY PATHOLOGIST: CPT | Mod: 26 | Performed by: DERMATOLOGY

## 2022-09-28 PROCEDURE — 11102 TANGNTL BX SKIN SINGLE LES: CPT | Mod: GC | Performed by: DERMATOLOGY

## 2022-09-28 PROCEDURE — 88305 TISSUE EXAM BY PATHOLOGIST: CPT | Mod: TC | Performed by: DERMATOLOGY

## 2022-09-28 PROCEDURE — 99213 OFFICE O/P EST LOW 20 MIN: CPT | Mod: 25 | Performed by: DERMATOLOGY

## 2022-09-28 ASSESSMENT — PAIN SCALES - GENERAL: PAINLEVEL: NO PAIN (0)

## 2022-09-28 NOTE — LETTER
9/28/2022       RE: Jose Alfredo Tomlinson  [address]  [place] [address]     Dear Colleague,    Thank you for referring your patient, Jose Alfredo Tomlinson, to the [facility] DERMATOLOGY CLINIC [place] at [facility]. Please see a copy of my visit note below.    [facility] Dermatology Note  Encounter Date: Sep 28, 2022  Office Visit     Dermatology Problem List:  # FBSE, 9/28/2022  1. Partially ruptured folliculitis, L medial calf s/p shave biopsy 6/25/19  2. 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 Hx of pancreas, s/p 10/29/07  4. Hx of kidney transplant, s/p 3/28/07  ____________________________________________    Assessment & Plan:    # NUB, L nose. Lesion concerning for squamous cell carinoma:   Non healing, bleeding, erythematous lesion on nose since summer of 2022. Immunosuppressed s/p kidney transplant places at elevated risk.   -shave biopsy, results pending    # Benign lesions: Multiple benign nevi, solar lentigos, seborrheic keratoses, cherry angiomas. Explained to patient benign nature of lesion. No treatment is necessary at this time unless the lesion changes or becomes symptomatic.   - ABCDs of melanoma were discussed and self skin checks were advised.  - Sun precaution was advised including the use of sun screens of SPF 30 or higher, sun protective clothing, and avoidance of tanning beds.    # HX of pancreas/kidney transplant   - Continue annual skin exams  - Monitor for any changing lesions     Procedures Performed:   - Shave biopsy procedure note, location(s): nose. After discussion of benefits and risks including but not limited to bleeding, infection, scar, incomplete removal, recurrence, and non-diagnostic biopsy, written consent and photographs were obtained. The area was cleaned with isopropyl alcohol. 0.5mL of 1% lidocaine with epinephrine was injected to  obtain adequate anesthesia of lesion(s). Shave biopsy at site(s) performed. Hemostasis was achieved with aluminium chloride. Petrolatum ointment and a sterile dressing were applied. The patient tolerated the procedure and no complications were noted. The patient was provided with verbal and written post care instructions.     Follow-up: pending path results    Staff, Resident and Scribe:     Anastasiya Pinto MD  PGY-2 Family Medicine Resident    Scribe Disclosure:  I, MARVIN WEDDEBBIE, am serving as a scribe to document services personally performed by Cuong Erickson MD based on data collection and the provider's statements to me.     Provider Disclosure:   The documentation recorded by the scribe accurately reflects the services I personally performed and the decisions made by me.    Staff Physician Comments:   I saw and evaluated the patient with the resident and I agree with the assessment and plan.  I was present for the examination.    Cuong Erickson MD  Pronouns: he/him/his    Department of Dermatology  [facility]: Phone: [phone], Fax:[phone]  [facility]: Phone: [phone] Fax: [phone]  ____________________________________________    CC: Skin Check (Jose Alfredo is here for a skin check and has a spot of concern on his nose. )    HPI:  Mr. Jose Alfredo Tomlinson is a(n) 62 year old male who presents today as a new patient for FBSE. Last seen by Dr. Carvalho on 6/25/19, at which time patient underwent a shave biopsy for treatment of an NUB on the left medial calf, which returned as partially ruptured folliculitis.    Today, patient is here for a spot check on his nose. The spot first appeared sometime this summer. Patient thought it was a pimple initially, but it did not regress. Bleeds every few days. Not increasing in size but not regressing.     The patient otherwise denies any new  or concerning lesions. No bleeding, painful, pruritic, or changing lesions. They report negative personal history of skin cancer. There is family history of skin cancer in both maternal and paternal grandfathers, unclear of exact type. Current history of immunosuppression secondary to kidney transplant. They do use sunscreen and protective clothing when outdoors for sun protection. Health otherwise stable. No other skin concerns.     Patient is otherwise feeling well, without additional skin concerns.    Labs Reviewed:  N/A    Physical Exam:  Vitals: There were no vitals taken for this visit.  SKIN: Focused examination of nose was performed. As well as a total skin exam including the face, trunk, limbs, groin and buttocks  - 1.5mm erythematous macule with multiple shallow blood vessels noted  - Multiple regular brown pigmented macules and papules are identified on the trunk and extremities.   - Scattered brown macules on sun exposed areas.  - There are waxy stuck on tan to brown papules on the trunk and extremities.   -tinea pedis noted on bilateral heels  - No other lesions of concern on areas examined.     Medications:  Current Outpatient Medications   Medication     aspirin (ASPIRIN LOW DOSE) 81 MG EC tablet     atorvastatin (LIPITOR) 10 MG tablet     CELLCEPT (BRAND) 500 MG tablet     omeprazole (PRILOSEC) 20 MG DR capsule     ondansetron (ZOFRAN-ODT) 4 MG ODT tab     order for DME     PROGRAF (BRAND) 0.5 MG capsule     PROGRAF (BRAND) 1 MG capsule     sodium bicarbonate 650 MG tablet     vitamin C (ASCORBIC ACID) 500 MG tablet     vitamin C (ASCORBIC ACID) 500 MG tablet     Vitamin D3 (CHOLECALCIFEROL) 25 mcg (1000 units) tablet     Current Facility-Administered Medications   Medication     lidocaine 1% with EPINEPHrine 1:100,000 injection 3 mL      Past Medical History:   Patient Active Problem List   Diagnosis     Hyperlipidemia     Peripheral neuropathy     PAD (peripheral artery disease) (H)     Seborrheic  keratosis     Acquired perforating dermatosis     Atrial bigeminy     Kidney replaced by transplant     Pancreas replaced by transplant (H)     HTN, kidney transplant related     Diabetic neuropathy, type I diabetes mellitus (H)     Tactile anesthesia     Personal history of diabetic foot ulcer     Right foot drop     Wrist drop, bilateral     Immunosuppression (H)     Past Medical History:   Diagnosis Date     Cataracts      Foot drop      Gastroparesis      GERD (gastroesophageal reflux disease)      History of diabetes mellitus, type I      Hyperlipidemia      Hypertension      Pancreas transplanted (H) 2007    hx of rejection in past      Peripheral neuropathy      S/P kidney transplant 2007

## 2022-09-28 NOTE — NURSING NOTE
Dermatology Rooming Note    Jose Alfredo Tomlinson's goals for this visit include:   Chief Complaint   Patient presents with     Skin Check     Jose Alfredo is here for a skin check and has a spot of concern on his nose.      Roberto Estrada, EMT

## 2022-09-28 NOTE — NURSING NOTE
Lidocaine-epinephrine 1-1:216458 % injection   1.5mL once for one use, starting 9/28/2022 ending 9/28/2022,  2mL disp, R-0, injection  Injected by Dr. Erickson

## 2022-09-29 LAB
PATH REPORT.COMMENTS IMP SPEC: ABNORMAL
PATH REPORT.COMMENTS IMP SPEC: ABNORMAL
PATH REPORT.COMMENTS IMP SPEC: YES
PATH REPORT.FINAL DX SPEC: ABNORMAL
PATH REPORT.GROSS SPEC: ABNORMAL
PATH REPORT.MICROSCOPIC SPEC OTHER STN: ABNORMAL
PATH REPORT.RELEVANT HX SPEC: ABNORMAL

## 2022-09-30 NOTE — RESULT ENCOUNTER NOTE
Mychart sent.   Can we place derm surg referral for MMS x 1 for BCC on the nose. Will need consult as has never had prior.   Also schedule follow-up with me in 6 months in transplant skin check slot.   Thanks!    Cuong Erickson MD  Pronouns: he/him/his    Department of Dermatology  [facility]: Phone: [phone], Fax:[phone]  [facility]: Phone: [phone] Fax: [phone]

## 2022-10-03 ENCOUNTER — MYC MEDICAL ADVICE (OUTPATIENT)
Dept: DERMATOLOGY | Facility: CLINIC | Age: 62
End: 2022-10-03

## 2022-10-03 ENCOUNTER — TELEPHONE (OUTPATIENT)
Dept: DERMATOLOGY | Facility: CLINIC | Age: 62
End: 2022-10-03

## 2022-10-03 DIAGNOSIS — C44.91 BCC (BASAL CELL CARCINOMA): Primary | ICD-10-CM

## 2022-10-03 NOTE — TELEPHONE ENCOUNTER
Mychart sent.   Can we place derm surg referral for MMS x 1 for BCC on the nose. Will need consult as has never had prior.   Also schedule follow-up with me in 6 months in transplant skin check slot.   Thanks!     Cuong Erickson MD   Pronouns: he/him/his      Department of Dermatology   [facility]: Phone: [phone], Fax:[phone]   [facility]: Phone: [phone] Fax: [phone]

## 2022-10-05 ENCOUNTER — TELEPHONE (OUTPATIENT)
Dept: DERMATOLOGY | Facility: CLINIC | Age: 62
End: 2022-10-05

## 2022-10-05 NOTE — TELEPHONE ENCOUNTER
FUTURE VISIT INFORMATION      FUTURE VISIT INFORMATION:    Date: 11.1.22    Time: 3:00    Location: Telephone  REFERRAL INFORMATION:    Referring provider: Georgina    Referring providers clinic:  Derm    Reason for visit/diagnosis  BCC, Left Nose    RECORDS REQUESTED FROM:       Clinic name Comments Records Status Imaging Status   Derm 9.28.22  Path # [id number] Epic Epic

## 2022-10-05 NOTE — TELEPHONE ENCOUNTER
Called patient to schedule surgery with Dr. Dominguez    Date of Surgery: 11/15    Surgery type: mohs    Consult scheduled: Yes    Has patient had mohs before? No    Additional comments: CHARLES Thornton on 10/5/2022 at 8:59 AM

## 2022-10-11 DIAGNOSIS — Z94.83 PANCREAS REPLACED BY TRANSPLANT (H): ICD-10-CM

## 2022-10-11 DIAGNOSIS — Z94.0 KIDNEY REPLACED BY TRANSPLANT: Primary | ICD-10-CM

## 2022-10-11 RX ORDER — TACROLIMUS 1 MG/1
1 CAPSULE, GELATIN COATED ORAL 2 TIMES DAILY
Qty: 60 CAPSULE | Refills: 0 | Status: SHIPPED | OUTPATIENT
Start: 2022-10-11 | End: 2022-11-03

## 2022-10-11 RX ORDER — TACROLIMUS 0.5 MG/1
0.5 CAPSULE, GELATIN COATED ORAL 2 TIMES DAILY
Qty: 60 CAPSULE | Refills: 0 | Status: SHIPPED | OUTPATIENT
Start: 2022-10-11 | End: 2022-11-03

## 2022-10-11 RX ORDER — SODIUM BICARBONATE 650 MG/1
1950 TABLET ORAL 2 TIMES DAILY
Qty: 180 TABLET | Refills: 0 | Status: SHIPPED | OUTPATIENT
Start: 2022-10-11 | End: 2022-11-03

## 2022-10-17 DIAGNOSIS — Z94.0 KIDNEY REPLACED BY TRANSPLANT: ICD-10-CM

## 2022-10-17 DIAGNOSIS — Z94.83 PANCREAS REPLACED BY TRANSPLANT (H): ICD-10-CM

## 2022-10-17 RX ORDER — MYCOPHENOLATE MOFETIL 500 MG/1
500 TABLET, FILM COATED ORAL 2 TIMES DAILY
Qty: 60 TABLET | Refills: 3 | Status: SHIPPED | OUTPATIENT
Start: 2022-10-17 | End: 2023-02-27

## 2022-10-19 ENCOUNTER — TELEPHONE (OUTPATIENT)
Dept: TRANSPLANT | Facility: CLINIC | Age: 62
End: 2022-10-19

## 2022-10-19 NOTE — LETTER
OUTPATIENT LABORATORY TEST ORDER    Patient Name: Jose Alfredo Tomlinson  Transplant Date: 10/29/2007   YOB: 1960                                         Issue Date & Time:10/19/2022  12:58 PM  [facility] MR: [id number] Exp. Date (1 year after date issued)      Diagnoses: Kidney Transplant (ICD-10  Z94.0)   Long term use of medications (ICD-10  Z79.899)     Lab results to be available on the same day drawn.   Patient should release information to the [facility].  Please fax to the Transplant Center at [phone].    Monthly   ?Hemogram and Platelet  ?Basic Metabolic Panel (Sodium, Potassium, Chloride, CO2, Creatinine, Urea Nitrogen,     Glucose,   Calcium)         ?/Tacrolimus/Prograf drug level                            Every 6 months                                          ?Urine for protein/creatinine       If you have any questions, please call The Transplant Center at [phone] or [phone].    Please fax labs to [phone]    .

## 2022-10-19 NOTE — TELEPHONE ENCOUNTER
Left message for patient regarding lab schedule, copy of current lab letter sent to patient via [employer].

## 2022-11-01 ENCOUNTER — VIRTUAL VISIT (OUTPATIENT)
Dept: DERMATOLOGY | Facility: CLINIC | Age: 62
End: 2022-11-01
Payer: MEDICARE

## 2022-11-01 ENCOUNTER — PRE VISIT (OUTPATIENT)
Dept: DERMATOLOGY | Facility: CLINIC | Age: 62
End: 2022-11-01

## 2022-11-01 DIAGNOSIS — C44.311 BASAL CELL CARCINOMA (BCC) OF SUPRATIP OF NOSE: Primary | ICD-10-CM

## 2022-11-01 PROCEDURE — 99214 OFFICE O/P EST MOD 30 MIN: CPT | Mod: GC | Performed by: DERMATOLOGY

## 2022-11-01 NOTE — LETTER
11/1/2022       RE: Jose Alfredo Tomlinson  [address] [place] [address] [address]  [place] [address]     Dear Colleague,    Thank you for referring your patient, Jose Alfredo Tomlinson, to the [facility] DERMATOLOGIC SURGERY CLINIC [place] at [facility]. Please see a copy of my visit note below.    Mohs Micrographic Surgery Consult Note    Nov 1, 2022  Start time (if telephone encounter): 3:00 p.m.  End time (if telephone encounter): 3:10 p.m.    Dermatology Problem List:  # FBSE, 9/28/2022  1. Partially ruptured folliculitis, L medial calf s/p shave biopsy 6/25/19  2. 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 Hx of pancreas, s/p 10/29/07  4. Hx of kidney transplant, s/p 3/28/07  5. Hx of NMSC  - BCC, L nose, s/p bx 9/28/2022    Subjective: The patient is a 62 year old man who presents today for Mohs micrographic surgery consultation for a recent diagnosis of skin cancer.    Skin cancer(s): BCC  Location(s): L nose  Associated symptoms: pruritus, tenderness to touch  Onset: within last 1 year    No other associated symptoms, modifying factors, or prior treatments, except when noted above. The patient denies any constitutional symptoms, lymphadenopathy, unintentional weight loss or decreased appetite. No other skin concerns today.    Objective:   Skin: Focused examination of the nose within the teledermatology photograph(s) on 9/28/2022 was performed.   - 0.6 cm ill-defined pink papule with central ulceration on L supratip area of nose    Assessment and Plan:     1. Plan for Mohs micrographic surgery for skin cancer(s) above:  - We discussed the nature of the diagnosis/condition above. We discussed the treatment options, including the risks benefits and expectations of these options. We recommend micrographic surgery as the most effective and most tissue sparing option for treatment, and the patient agrees to proceed with  this.  The patient is aware of the risks, benefits and expectations of this procedure. The patient will be scheduled for this procedure, if not already done so.  - We anticipate the following closure type: Transposition flap    The patient was discussed with and evaluated by attending physician, Goyo Dominguez MD.    Brian Escamilla MD  Dermatology, PGY-5  Mohs surgery fellow    Scribe Disclosure:  I, Gely Lake, am serving as a scribe to document services personally performed by Goyo Dominguez MD based on data collection and the provider's statements to me.     Attending Attestation  I attest that the Fellow recorded the interview and exam that I personally performed.  I have reviewed the note and edited it as necessary.    Goyo Dominguez M.D.  Professor  Director of Dermatologic Surgery  Department of Dermatology  [facility]

## 2022-11-01 NOTE — PROGRESS NOTES
Mohs Micrographic Surgery Consult Note    Nov 1, 2022  Start time (if telephone encounter): 3:00 p.m.  End time (if telephone encounter): 3:10 p.m.    Dermatology Problem List:  # FBSE, 9/28/2022  1. Partially ruptured folliculitis, L medial calf s/p shave biopsy 6/25/19  2. 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 Hx of pancreas, s/p 10/29/07  4. Hx of kidney transplant, s/p 3/28/07  5. Hx of NMSC  - BCC, L nose, s/p bx 9/28/2022    Subjective: The patient is a 62 year old man who presents today for Mohs micrographic surgery consultation for a recent diagnosis of skin cancer.    Skin cancer(s): BCC  Location(s): L nose  Associated symptoms: pruritus, tenderness to touch  Onset: within last 1 year    No other associated symptoms, modifying factors, or prior treatments, except when noted above. The patient denies any constitutional symptoms, lymphadenopathy, unintentional weight loss or decreased appetite. No other skin concerns today.    Objective:   Skin: Focused examination of the nose within the teledermatology photograph(s) on 9/28/2022 was performed.   - 0.6 cm ill-defined pink papule with central ulceration on L supratip area of nose    Assessment and Plan:     1. Plan for Mohs micrographic surgery for skin cancer(s) above:  - We discussed the nature of the diagnosis/condition above. We discussed the treatment options, including the risks benefits and expectations of these options. We recommend micrographic surgery as the most effective and most tissue sparing option for treatment, and the patient agrees to proceed with this.  The patient is aware of the risks, benefits and expectations of this procedure. The patient will be scheduled for this procedure, if not already done so.  - We anticipate the following closure type: Transposition flap    The patient was discussed with and evaluated by attending physician, Goyo Dominguez MD.    Brian Escamilla,  MD  Dermatology, PGY-5  Mohs surgery fellow    Scribe Disclosure:  I, Gely Aleksandra, am serving as a scribe to document services personally performed by Goyo Dominguez MD based on data collection and the provider's statements to me.     Attending Attestation  I attest that the Fellow recorded the interview and exam that I personally performed.  I have reviewed the note and edited it as necessary.    Goyo Dominguez M.D.  Professor  Director of Dermatologic Surgery  Department of Dermatology  [facility]

## 2022-11-02 DIAGNOSIS — Z94.0 S/P KIDNEY TRANSPLANT: ICD-10-CM

## 2022-11-02 DIAGNOSIS — Z94.0 KIDNEY REPLACED BY TRANSPLANT: ICD-10-CM

## 2022-11-02 DIAGNOSIS — Z94.83 PANCREAS REPLACED BY TRANSPLANT (H): ICD-10-CM

## 2022-11-03 RX ORDER — TACROLIMUS 1 MG/1
CAPSULE, GELATIN COATED ORAL
Qty: 60 CAPSULE | Refills: 0 | Status: SHIPPED | OUTPATIENT
Start: 2022-11-03 | End: 2022-12-20

## 2022-11-03 RX ORDER — TACROLIMUS 0.5 MG/1
CAPSULE, GELATIN COATED ORAL
Qty: 60 CAPSULE | Refills: 0 | Status: SHIPPED | OUTPATIENT
Start: 2022-11-03 | End: 2022-12-20

## 2022-11-03 RX ORDER — SODIUM BICARBONATE 650 MG/1
1950 TABLET ORAL 2 TIMES DAILY
Qty: 180 TABLET | Refills: 3 | Status: SHIPPED | OUTPATIENT
Start: 2022-11-03 | End: 2023-03-27

## 2022-11-08 NOTE — TELEPHONE ENCOUNTER
Last Clinic Visit: 10/14/2021 [facility] Internal Medicine [place]    Appointment past due: Francisca refill of Aspirin was sent for 90 day supply and staff message sent to [facility] clinic scheduling.

## 2022-11-09 ENCOUNTER — TELEPHONE (OUTPATIENT)
Dept: INTERNAL MEDICINE | Facility: CLINIC | Age: 62
End: 2022-11-09

## 2022-11-09 NOTE — TELEPHONE ENCOUNTER
Patient said he would call back to schedule an appointment with Dr. Salas.  Patient was provided with contact information to call.

## 2022-11-12 DIAGNOSIS — Z94.0 KIDNEY TRANSPLANTED: ICD-10-CM

## 2022-11-12 DIAGNOSIS — Z94.83 PANCREAS TRANSPLANTED (H): ICD-10-CM

## 2022-11-12 DIAGNOSIS — E78.00 HIGH CHOLESTEROL: ICD-10-CM

## 2022-11-14 ENCOUNTER — TELEPHONE (OUTPATIENT)
Dept: INTERNAL MEDICINE | Facility: CLINIC | Age: 62
End: 2022-11-14

## 2022-11-14 RX ORDER — ATORVASTATIN CALCIUM 10 MG/1
10 TABLET, FILM COATED ORAL DAILY
Qty: 90 TABLET | Refills: 0 | Status: SHIPPED | OUTPATIENT
Start: 2022-11-14 | End: 2023-03-28

## 2022-11-14 NOTE — TELEPHONE ENCOUNTER
Patient will call back to schedule annual visit with Dr. Salas.  Patient has contact information to call back.

## 2022-11-14 NOTE — TELEPHONE ENCOUNTER
atorvastatin (LIPITOR) 10 MG tablet      Last Written Prescription Date:  09-  Last Fill Quantity: 90,   # refills: 0  Last Office Visit : 10-  Future Office visit:  Not on file    Routing refill request to provider for review/approval because:  Statins Protocol Failed 11/12/2022 10:06 AM   Protocol Details  LDL on file in past 12 months    Recent (12 mo) or future (30 days) visit within the authorizing provider's specialty     Recent Labs   Lab Test 09/20/21  0810   *     Francisca refill per protocol    Please call to schedule.  Patient is overdue for annual clinic visit - unless otherwise indicated patient needs to be scheduled with 1st available.  Patient may need labs and or imaging - please check with RN care coordinator.    Thanks    I do not need any follow up on the scheduling of this appointment unless the patient will no longer be receiving care in our clinic.

## 2022-11-15 ENCOUNTER — OFFICE VISIT (OUTPATIENT)
Dept: DERMATOLOGY | Facility: CLINIC | Age: 62
End: 2022-11-15
Payer: MEDICARE

## 2022-11-15 VITALS — DIASTOLIC BLOOD PRESSURE: 100 MMHG | SYSTOLIC BLOOD PRESSURE: 184 MMHG | HEART RATE: 74 BPM

## 2022-11-15 DIAGNOSIS — C44.311 BASAL CELL CARCINOMA (BCC) OF SUPRATIP OF NOSE: Primary | ICD-10-CM

## 2022-11-15 PROCEDURE — 14061 TIS TRNFR E/N/E/L10.1-30SQCM: CPT | Mod: GC | Performed by: DERMATOLOGY

## 2022-11-15 PROCEDURE — 17311 MOHS 1 STAGE H/N/HF/G: CPT | Mod: GC | Performed by: DERMATOLOGY

## 2022-11-15 ASSESSMENT — PAIN SCALES - GENERAL: PAINLEVEL: NO PAIN (0)

## 2022-11-15 NOTE — NURSING NOTE
Chief Complaint   Patient presents with     Derm Problem     Patient is here today for mohs on left nose.      Cami LIAO CMA

## 2022-11-15 NOTE — LETTER
11/15/2022       RE: Jose Alfredo Tomlinson  [address]  [place] [address]     Dear Colleague,    Thank you for referring your patient, Jose Alfredo Tomlinson, to the [facility] DERMATOLOGIC SURGERY CLINIC [place] at [facility]. Please see a copy of my visit note below.    [facility] Mohs Surgery Procedure Note    Case #: 1  Date of Service:  Nov 15, 2022  Surgery: Mohs micrographic surgery (MMS)  Staff surgeon: Goyo Dominguez MD  Fellow surgeon: Brian Escamilla MD  Resident surgeon: None  Nurse: Cami You CMA    Tumor Type: BCC  Location: left supratip nose  Derm-Path Accession #: [id number]    Mohs Accession #:   Pre-Op Size: 0.5 cm x 0.5 cm  Final Defect Size: 1.2 cm x 1.0 cm  Number of Mohs stages: 1  Level of Defect: Fascia  Local anesthetic: 3 mL 1% lidocaine with epinephrine  Repair Type: transposition flap  Repair Size: 4.0 x 2.5 cm  Suture Material: 4-0 Monocryl; 5-0 fast absorbing gut    Procedure:    Stage I  We discussed the principles of treatment and most likely complications including scarring, bleeding, infection, swelling, pain, crusting, nerve damage, large wound,  incomplete excision, wound dehiscence,  nerve damage, recurrence, and a second procedure may be recommended to obtain the best cosmetic or functional result.    Informed consent was obtained and the patient underwent the procedure as follows:  The patient was placed supine on the operating table.  The cancer was identified, outlined with a marker, and verified by the patient.  The entire surgical field was prepped with chlorhexidine.  The surgical site was anesthetized using lidocaine with epinephrine.    The area of clinically apparent tumor was debulked. The layer of tissue was then surgically excised using a #15 blade and was then transferred onto a specimen sheet maintaining the orientation of the specimen. Hemostasis was obtained using  electrocoagulation. The wound site was then covered with a dressing while the tissue samples were processed for examination.    The excised tissue was transported to the Mohs histology laboratory maintaining the tissue orientation.  The tissue specimen was relaxed so that the entire surgical margin was in a a single horizontal plane for sectioning and inked for precise mapping.  A precise reference map was drawn to reflect the sectioning of the specimen, colored inking of the margins, and orientation on the patient.  The tissue was processed using horizontal sectioning of the base and continuous peripheral margins.  The histopathologic sections were reviewed in conjunction with the reference map.    Total blocks: 1  Total slides: 2    There were no cancer cells visualized on examination, therefore Mohs surgery was complete.    REPAIR: Bilobed Transposition Flap (Nose)    The patient was taken to the operative suite and placed supine on the operating room table.  The wound was identified and infiltrated with 1% lidocaine with epinephrine.  The defect was then cleansed and prepped with chlorhexidine and draped with sterile drapes.  Using a marker, a bilobed transposition flap repair was planned.  The wound edges were then debeveled and the wound was undermined bluntly in all directions. The transposition flap was incised sharply to the level of the subnasalis muscle.  The flap was undermined from all surrounding tissue. Hemostasis was obtained with electrodessication.  The flap was transposed into the primary defect.  The secondary defect and flap were closed with deep dermal 5-0 Monocryl sutures.  Epidermal tissue was carefully approximated using 5-0 fast absorbing gut in a simple running fashion throughout the length of the flap.  Redundant skin was excised by the triangulation technique and closed in similar fashion.  The wound was cleansed with sterile saline and Vaseline was applied. A sterile non-adherent  pressure dressing was placed.  The patient left the operating suite in stable condition.    Follow-up for suture removal: Not applicable as only dissolving sutures used     Goyo Dominguez MD, was present for the entire micrographic surgery and key portions of the reconstruction, and always immediately available.    Brian Escamilla MD  Dermatology, PGY-5  Mohs surgery fellow    Scribe Disclosure:  I, MARVIN DREW, am serving as a scribe to document services personally performed by Goyo Dominguez MD based on data collection and the provider's statements to me.       Attending attestation:  I was present for key elements of the procedure and immediately available for all other portions of the procedure.  I have reviewed the note and edited it as necessary.    Goyo Dominguez M.D.  Professor  Director of Dermatologic Surgery  Department of Dermatology  [facility]    Dermatology Surgery Clinic  [facility]  [address] [place] [id number]

## 2022-11-15 NOTE — PROGRESS NOTES
[facility] Mohs Surgery Procedure Note    Case #: 1  Date of Service:  Nov 15, 2022  Surgery: Mohs micrographic surgery (MMS)  Staff surgeon: Goyo Dominguez MD  Fellow surgeon: Brian Escamilla MD  Resident surgeon: None  Nurse: Cami You CMA    Tumor Type: BCC  Location: left supratip nose  Derm-Path Accession #: [id number]    Mohs Accession #:   Pre-Op Size: 0.5 cm x 0.5 cm  Final Defect Size: 1.2 cm x 1.0 cm  Number of Mohs stages: 1  Level of Defect: Fascia  Local anesthetic: 3 mL 1% lidocaine with epinephrine  Repair Type: transposition flap  Repair Size: 4.0 x 2.5 cm  Suture Material: 4-0 Monocryl; 5-0 fast absorbing gut    Procedure:    Stage I  We discussed the principles of treatment and most likely complications including scarring, bleeding, infection, swelling, pain, crusting, nerve damage, large wound,  incomplete excision, wound dehiscence,  nerve damage, recurrence, and a second procedure may be recommended to obtain the best cosmetic or functional result.    Informed consent was obtained and the patient underwent the procedure as follows:  The patient was placed supine on the operating table.  The cancer was identified, outlined with a marker, and verified by the patient.  The entire surgical field was prepped with chlorhexidine.  The surgical site was anesthetized using lidocaine with epinephrine.    The area of clinically apparent tumor was debulked. The layer of tissue was then surgically excised using a #15 blade and was then transferred onto a specimen sheet maintaining the orientation of the specimen. Hemostasis was obtained using electrocoagulation. The wound site was then covered with a dressing while the tissue samples were processed for examination.    The excised tissue was transported to the Mohs histology laboratory maintaining the tissue orientation.  The tissue specimen was relaxed so that the entire surgical margin was in a a single horizontal plane for  sectioning and inked for precise mapping.  A precise reference map was drawn to reflect the sectioning of the specimen, colored inking of the margins, and orientation on the patient.  The tissue was processed using horizontal sectioning of the base and continuous peripheral margins.  The histopathologic sections were reviewed in conjunction with the reference map.    Total blocks: 1  Total slides: 2    There were no cancer cells visualized on examination, therefore Mohs surgery was complete.    REPAIR: Bilobed Transposition Flap (Nose)    The patient was taken to the operative suite and placed supine on the operating room table.  The wound was identified and infiltrated with 1% lidocaine with epinephrine.  The defect was then cleansed and prepped with chlorhexidine and draped with sterile drapes.  Using a marker, a bilobed transposition flap repair was planned.  The wound edges were then debeveled and the wound was undermined bluntly in all directions. The transposition flap was incised sharply to the level of the subnasalis muscle.  The flap was undermined from all surrounding tissue. Hemostasis was obtained with electrodessication.  The flap was transposed into the primary defect.  The secondary defect and flap were closed with deep dermal 5-0 Monocryl sutures.  Epidermal tissue was carefully approximated using 5-0 fast absorbing gut in a simple running fashion throughout the length of the flap.  Redundant skin was excised by the triangulation technique and closed in similar fashion.  The wound was cleansed with sterile saline and Vaseline was applied. A sterile non-adherent pressure dressing was placed.  The patient left the operating suite in stable condition.    Follow-up for suture removal: Not applicable as only dissolving sutures used     Goyo Dominguez MD, was present for the entire micrographic surgery and key portions of the reconstruction, and always immediately available.    Brian Escamilla MD  Dermatology,  PGY-5  Mohs surgery fellow    Scribe Disclosure:  I, MARVIN RAJENDRA, am serving as a scribe to document services personally performed by Goyo Dominguez MD based on data collection and the provider's statements to me.       Attending attestation:  I was present for key elements of the procedure and immediately available for all other portions of the procedure.  I have reviewed the note and edited it as necessary.    Goyo Dominguez M.D.  Professor  Director of Dermatologic Surgery  Department of Dermatology  [facility]    Dermatology Surgery Clinic  [facility] [place] [phone]

## 2022-11-15 NOTE — PATIENT INSTRUCTIONS

## 2022-11-17 ENCOUNTER — MYC MEDICAL ADVICE (OUTPATIENT)
Dept: DERMATOLOGY | Facility: CLINIC | Age: 62
End: 2022-11-17

## 2022-11-19 ENCOUNTER — HEALTH MAINTENANCE LETTER (OUTPATIENT)
Age: 62
End: 2022-11-19

## 2022-12-20 DIAGNOSIS — Z94.0 KIDNEY REPLACED BY TRANSPLANT: ICD-10-CM

## 2022-12-20 DIAGNOSIS — Z94.83 PANCREAS REPLACED BY TRANSPLANT (H): ICD-10-CM

## 2022-12-20 RX ORDER — TACROLIMUS 0.5 MG/1
CAPSULE, GELATIN COATED ORAL
Qty: 60 CAPSULE | Refills: 11 | Status: SHIPPED | OUTPATIENT
Start: 2022-12-20 | End: 2023-12-22

## 2022-12-20 RX ORDER — TACROLIMUS 1 MG/1
CAPSULE, GELATIN COATED ORAL
Qty: 60 CAPSULE | Refills: 3 | Status: SHIPPED | OUTPATIENT
Start: 2022-12-20 | End: 2023-04-20

## 2022-12-25 ENCOUNTER — NURSE TRIAGE (OUTPATIENT)
Dept: NURSING | Facility: CLINIC | Age: 62
End: 2022-12-25

## 2022-12-25 NOTE — TELEPHONE ENCOUNTER
Nasal congestion, sore throat, headache starting . Fever on day one but afebrile since. Denies breathing or swallowing difficulty. No cough. Has not done a covid test. The one he has is . He is high risk - transplant pt. Advised home care - do a covid test today or tomorrow. Call back if results are positive so we can set up a phone visit to discuss antiviral tx. If feeling worse call back right away or ED. Pt voiced understanding and agreement.       Reason for Disposition    [1] COVID-19 infection suspected by caller or triager AND [2] mild symptoms (cough, fever, or others) AND [3] has not gotten tested yet    Additional Information    Negative: SEVERE difficulty breathing (e.g., struggling for each breath, speaks in single words)    Negative: Difficult to awaken or acting confused (e.g., disoriented, slurred speech)    Negative: Bluish (or gray) lips or face now    Negative: Shock suspected (e.g., cold/pale/clammy skin, too weak to stand, low BP, rapid pulse)    Negative: Sounds like a life-threatening emergency to the triager    Negative: [1] Diagnosed or suspected COVID-19 AND [2] symptoms lasting 3 or more weeks    Negative: [1] COVID-19 exposure AND [2] no symptoms    Negative: COVID-19 vaccine reaction suspected (e.g., fever, headache, muscle aches) occurring 1 to 3 days after getting vaccine    Negative: COVID-19 vaccine, questions about    Negative: [1] Lives with someone known to have influenza (flu test positive) AND [2] flu-like symptoms (e.g., cough, runny nose, sore throat, SOB; with or without fever)    Negative: [1] Adult with possible COVID-19 symptoms AND [2] triager concerned about severity of symptoms or other causes    Negative: COVID-19 and breastfeeding, questions about    Negative: SEVERE or constant chest pain or pressure  (Exception: Mild central chest pain, present only when coughing.)    Negative: MODERATE difficulty breathing (e.g., speaks in phrases, SOB even at rest,  pulse 100-120)    Negative: [1] Headache AND [2] stiff neck (can't touch chin to chest)    Negative: Chest pain or pressure    Negative: MILD difficulty breathing (e.g., minimal/no SOB at rest, SOB with walking, pulse <100)    Negative: Fever > 103 F (39.4 C)    Negative: [1] Fever > 100.0 F (37.8 C) AND [2] bedridden (e.g., nursing home patient, CVA, chronic illness, recovering from surgery)    Negative: [1] Fever > 101 F (38.3 C) AND [2] age > 60 years    Negative: Fever present > 3 days (72 hours)    Negative: [1] Fever returns after gone for over 24 hours AND [2] symptoms worse or not improved    Negative: [1] Continuous (nonstop) coughing interferes with work or school AND [2] no improvement using cough treatment per Care Advice    Negative: [1] COVID-19 infection suspected by caller or triager AND [2] mild symptoms (cough, fever, or others) AND [3] negative COVID-19 rapid test    Negative: Cough present > 3 weeks    Negative: [1] COVID-19 diagnosed by positive lab test (e.g., PCR, rapid self-test kit) AND [2] NO symptoms (e.g., cough, fever, others)    Negative: [1] COVID-19 diagnosed by positive lab test (e.g., PCR, rapid self-test kit) AND [2] mild symptoms (e.g., cough, fever, others) AND [3] no complications or SOB    Negative: [1] COVID-19 diagnosed by doctor (or NP/PA) AND [2] mild symptoms (e.g., cough, fever, others) AND [3] no complications or SOB    Negative: [1] COVID-19 diagnosed AND [2] has mild nausea, vomiting or diarrhea    Commented on: HIGH RISK for severe COVID complications (e.g., weak immune system, age > 64 years, obesity with BMI > 25, pregnant, chronic lung disease or other chronic medical condition)  (Exception: Already seen by PCP and no new or worsening symptoms.)     Yes but no covid test done. Will do and call back.    Protocols used: CORONAVIRUS (COVID-19) DIAGNOSED OR [id number] 1.18.2022

## 2022-12-26 ENCOUNTER — OFFICE VISIT (OUTPATIENT)
Dept: INTERNAL MEDICINE | Facility: CLINIC | Age: 62
End: 2022-12-26
Payer: MEDICARE

## 2022-12-26 ENCOUNTER — ANCILLARY PROCEDURE (OUTPATIENT)
Dept: GENERAL RADIOLOGY | Facility: CLINIC | Age: 62
End: 2022-12-26
Attending: INTERNAL MEDICINE
Payer: MEDICARE

## 2022-12-26 ENCOUNTER — LAB (OUTPATIENT)
Dept: LAB | Facility: CLINIC | Age: 62
End: 2022-12-26
Payer: MEDICARE

## 2022-12-26 VITALS
DIASTOLIC BLOOD PRESSURE: 79 MMHG | SYSTOLIC BLOOD PRESSURE: 131 MMHG | HEART RATE: 82 BPM | OXYGEN SATURATION: 96 % | WEIGHT: 181.1 LBS | BODY MASS INDEX: 24.53 KG/M2 | TEMPERATURE: 98.6 F | HEIGHT: 72 IN

## 2022-12-26 DIAGNOSIS — Z94.0 KIDNEY REPLACED BY TRANSPLANT: ICD-10-CM

## 2022-12-26 DIAGNOSIS — Z48.298 AFTERCARE FOLLOWING ORGAN TRANSPLANT: ICD-10-CM

## 2022-12-26 DIAGNOSIS — J02.9 PHARYNGITIS, UNSPECIFIED ETIOLOGY: ICD-10-CM

## 2022-12-26 DIAGNOSIS — R73.09 OTHER ABNORMAL GLUCOSE: ICD-10-CM

## 2022-12-26 DIAGNOSIS — R93.429 ABNORMAL ULTRASOUND OF KIDNEY: ICD-10-CM

## 2022-12-26 DIAGNOSIS — R05.9 COUGH, UNSPECIFIED TYPE: ICD-10-CM

## 2022-12-26 DIAGNOSIS — Z20.828 CONTACT WITH AND (SUSPECTED) EXPOSURE TO OTHER VIRAL COMMUNICABLE DISEASES: ICD-10-CM

## 2022-12-26 DIAGNOSIS — E01.0 THYROMEGALY: ICD-10-CM

## 2022-12-26 DIAGNOSIS — Z94.83 PANCREAS REPLACED BY TRANSPLANT (H): ICD-10-CM

## 2022-12-26 DIAGNOSIS — Z79.899 OTHER LONG TERM (CURRENT) DRUG THERAPY: ICD-10-CM

## 2022-12-26 DIAGNOSIS — R05.9 COUGH, UNSPECIFIED TYPE: Primary | ICD-10-CM

## 2022-12-26 LAB
ALBUMIN MFR UR ELPH: 164 MG/DL
AMYLASE SERPL-CCNC: 67 U/L (ref 28–100)
ANION GAP SERPL CALCULATED.3IONS-SCNC: 13 MMOL/L (ref 7–15)
BUN SERPL-MCNC: 25.5 MG/DL (ref 8–23)
CALCIUM SERPL-MCNC: 9.5 MG/DL (ref 8.8–10.2)
CHLORIDE SERPL-SCNC: 112 MMOL/L (ref 98–107)
CREAT SERPL-MCNC: 1.23 MG/DL (ref 0.67–1.17)
CREAT UR-MCNC: 118 MG/DL
CRP SERPL-MCNC: 23.8 MG/L
DEPRECATED HCO3 PLAS-SCNC: 18 MMOL/L (ref 22–29)
DEPRECATED S PYO AG THROAT QL EIA: NEGATIVE
ERYTHROCYTE [DISTWIDTH] IN BLOOD BY AUTOMATED COUNT: 13.7 % (ref 10–15)
GFR SERPL CREATININE-BSD FRML MDRD: 67 ML/MIN/1.73M2
GLUCOSE SERPL-MCNC: 95 MG/DL (ref 70–99)
GROUP A STREP BY PCR: NOT DETECTED
HBA1C MFR BLD: 5.3 %
HCT VFR BLD AUTO: 43.3 % (ref 40–53)
HGB BLD-MCNC: 13.8 G/DL (ref 13.3–17.7)
LIPASE SERPL-CCNC: 37 U/L (ref 13–60)
MCH RBC QN AUTO: 27.9 PG (ref 26.5–33)
MCHC RBC AUTO-ENTMCNC: 31.9 G/DL (ref 31.5–36.5)
MCV RBC AUTO: 88 FL (ref 78–100)
PLATELET # BLD AUTO: 154 10E3/UL (ref 150–450)
POTASSIUM SERPL-SCNC: 4.3 MMOL/L (ref 3.4–5.3)
PROT/CREAT 24H UR: 1.39 MG/MG CR (ref 0–0.2)
RBC # BLD AUTO: 4.95 10E6/UL (ref 4.4–5.9)
SODIUM SERPL-SCNC: 143 MMOL/L (ref 136–145)
TSH SERPL DL<=0.005 MIU/L-ACNC: 3.16 UIU/ML (ref 0.3–4.2)
WBC # BLD AUTO: 6.2 10E3/UL (ref 4–11)

## 2022-12-26 PROCEDURE — 82150 ASSAY OF AMYLASE: CPT | Performed by: PATHOLOGY

## 2022-12-26 PROCEDURE — 86140 C-REACTIVE PROTEIN: CPT | Performed by: PATHOLOGY

## 2022-12-26 PROCEDURE — 85027 COMPLETE CBC AUTOMATED: CPT | Performed by: PATHOLOGY

## 2022-12-26 PROCEDURE — 84443 ASSAY THYROID STIM HORMONE: CPT | Performed by: PATHOLOGY

## 2022-12-26 PROCEDURE — 99214 OFFICE O/P EST MOD 30 MIN: CPT | Performed by: INTERNAL MEDICINE

## 2022-12-26 PROCEDURE — 87799 DETECT AGENT NOS DNA QUANT: CPT | Performed by: INTERNAL MEDICINE

## 2022-12-26 PROCEDURE — 71046 X-RAY EXAM CHEST 2 VIEWS: CPT | Mod: GC | Performed by: RADIOLOGY

## 2022-12-26 PROCEDURE — 86832 HLA CLASS I HIGH DEFIN QUAL: CPT | Performed by: INTERNAL MEDICINE

## 2022-12-26 PROCEDURE — 84156 ASSAY OF PROTEIN URINE: CPT | Performed by: PATHOLOGY

## 2022-12-26 PROCEDURE — 87651 STREP A DNA AMP PROBE: CPT | Performed by: INTERNAL MEDICINE

## 2022-12-26 PROCEDURE — 83690 ASSAY OF LIPASE: CPT | Performed by: PATHOLOGY

## 2022-12-26 PROCEDURE — 86833 HLA CLASS II HIGH DEFIN QUAL: CPT | Performed by: INTERNAL MEDICINE

## 2022-12-26 PROCEDURE — 80048 BASIC METABOLIC PNL TOTAL CA: CPT | Performed by: PATHOLOGY

## 2022-12-26 PROCEDURE — 36415 COLL VENOUS BLD VENIPUNCTURE: CPT | Performed by: PATHOLOGY

## 2022-12-26 PROCEDURE — 83036 HEMOGLOBIN GLYCOSYLATED A1C: CPT | Performed by: INTERNAL MEDICINE

## 2022-12-26 NOTE — NURSING NOTE
Jose Alfredo Tomlinson is a 62 year old male patient that presents today in clinic for the following:    Chief Complaint   Patient presents with     RECHECK     Coughing.  Congestion.  Difficulty swallowing.  Onset on Thursday with headache and fatigue.  Negative home COVID test.     The patient's allergies and medications were reviewed as noted. A set of vitals were recorded as noted without incident: /79 (BP Location: Right arm, Patient Position: Sitting, Cuff Size: Adult Regular)   Pulse 82   Temp 98.6  F (37  C) (Oral)   Ht 1.829 m (6')   Wt 82.1 kg (181 lb 1.6 oz)   SpO2 96%   BMI 24.56 kg/m  . The patient does not have any other questions for the provider.    Sanam Felton, EMT at 12:40 PM on 12/26/2022.  Primary care clinic: [phone]

## 2022-12-26 NOTE — PROGRESS NOTES
HPI  62-year-old kidney and pancreas transplant patient presents today for a 4-day history of cough and predominantly upper respiratory congestion.  Associated with some sore throat some pain with swallowing and persistent cough which he appears to to be related to upper respiratory congestion and drainage.  He denies any fever chills or sweats in association with this.  Cough is worse at night and worse when he lays down.  He has had no swollen glands no earache or sinus issues in association with this.  He is improved somewhat.  Past Medical History:   Diagnosis Date     Cataracts      Foot drop      Gastroparesis      GERD (gastroesophageal reflux disease)      History of diabetes mellitus, type I      Hyperlipidemia      Hypertension      Pancreas transplanted (H) 2007    hx of rejection in past      Peripheral neuropathy      S/P kidney transplant 2007     Past Surgical History:   Procedure Laterality Date     CATARACT IOL, RT/LT Bilateral      COLONOSCOPY  12/31/2013    Procedure: COMBINED COLONOSCOPY, SINGLE BIOPSY/POLYPECTOMY BY BIOPSY;  COLONOSCOPY;  Surgeon: Isac Colby MD;  Location:  GI     ESOPHAGOSCOPY, GASTROSCOPY, DUODENOSCOPY (EGD), COMBINED  1/20/2012    Procedure:COMBINED ESOPHAGOSCOPY, GASTROSCOPY, DUODENOSCOPY (EGD); Surgeon:GAB MARTIN; Location: GI     IR MISCELLANEOUS PROCEDURE  8/12/2003     IR MISCELLANEOUS PROCEDURE  8/12/2003     IR VENOUS PTA  8/12/2003     REPAIR PTOSIS Left 3/15/2019    Procedure: LEFT UPPER EYELID PTOSIS REPAIR;  Surgeon: Rj Jose MD;  Location:  OR     TRANSPLANT  2007    K/P     Family History   Problem Relation Age of Onset     Diabetes Son      Arthritis Mother      Diabetes Father      Diabetes Paternal Grandfather      Cancer Paternal Grandfather         Skin cancer     Cancer Maternal Grandfather         Skin cancer     Glaucoma No family hx of      Macular Degeneration No family hx of          Exam:  /79 (BP Location:  Right arm, Patient Position: Sitting, Cuff Size: Adult Regular)   Pulse 82   Temp 98.6  F (37  C) (Oral)   Ht 1.829 m (6')   Wt 82.1 kg (181 lb 1.6 oz)   SpO2 96%   BMI 24.56 kg/m    181 lbs 1.6 oz  Physical Exam   The patient is alert, oriented with a clear sensorium.   Skin shows no lesions or rashes and good turgor.   Head is normocephalic and atraumatic.    Ears show cerumen bilaterally.   Mouth shows bilateral tonsillar hypertrophy and erythema without exudate.   Neck shows no nodes, diffuse thyromegaly no bruits.   Back is non tender.  Lungs show bilateral rhonchi and prolonged expiratory phase  Heart shows normal S1 and S2 without murmur or gallop.   Labs reviewed:  Results for orders placed or performed in visit on 12/26/22   XR Chest 2 Views     Status: None    Narrative    XR CHEST 2 VIEWS  12/26/2022 1:56 PM     HISTORY:  Cough, unspecified type; Pharyngitis, unspecified etiology        COMPARISON:  Chest radiograph 2/5/2016    FINDINGS:   Frontal and lateral views of the chest. Trachea is midline. Cardiac  silhouette is within normal limits. Mild perihilar peribronchial  cuffing. No focal consolidation, pleural effusion or appreciable  pneumothorax.      Impression    IMPRESSION: Findings may suggest viral respiratory illness or reactive  airway disease. No focal pneumonia.    I have personally reviewed the examination and initial interpretation  and I agree with the findings.    CRISTO FLETCHER MD         SYSTEM ID:  [id number]   Results for orders placed or performed in visit on 12/26/22   CBC with platelets     Status: Normal   Result Value Ref Range    WBC Count 6.2 4.0 - 11.0 10e3/uL    RBC Count 4.95 4.40 - 5.90 10e6/uL    Hemoglobin 13.8 13.3 - 17.7 g/dL    Hematocrit 43.3 40.0 - 53.0 %    MCV 88 78 - 100 fL    MCH 27.9 26.5 - 33.0 pg    MCHC 31.9 31.5 - 36.5 g/dL    RDW 13.7 10.0 - 15.0 %    Platelet Count 154 150 - 450 10e3/uL   Basic metabolic panel     Status: Abnormal   Result Value  Ref Range    Sodium 143 136 - 145 mmol/L    Potassium 4.3 3.4 - 5.3 mmol/L    Chloride 112 (H) 98 - 107 mmol/L    Carbon Dioxide (CO2) 18 (L) 22 - 29 mmol/L    Anion Gap 13 7 - 15 mmol/L    Urea Nitrogen 25.5 (H) 8.0 - 23.0 mg/dL    Creatinine 1.23 (H) 0.67 - 1.17 mg/dL    Calcium 9.5 8.8 - 10.2 mg/dL    Glucose 95 70 - 99 mg/dL    GFR Estimate 67 >60 mL/min/1.73m2   Amylase     Status: Normal   Result Value Ref Range    Amylase 67 28 - 100 U/L   Lipase     Status: Normal   Result Value Ref Range    Lipase 37 13 - 60 U/L   Protein  random urine     Status: Abnormal   Result Value Ref Range    Total Protein Urine mg/dL 164.0 mg/dL    Total Protein UR MG/MG CR 1.39 (H) 0.00 - 0.20 mg/mg Cr    Creatinine Urine mg/dL 118.0 mg/dL   CRP inflammation     Status: Abnormal   Result Value Ref Range    CRP Inflammation 23.80 (H) <5.00 mg/L   PRA Donor Specific Antibody Every 6 months (Month 18, 24, 30, 36, 48, 60)     Status: None (In process)    Narrative    The following orders were created for panel order PRA Donor Specific Antibody Every 6 months (Month 18, 24, 30, 36, 48, 60).  Procedure                               Abnormality         Status                     ---------                               -----------         ------                     PRA Donor Specific Antibody[[id number]]                      In process                   Please view results for these tests on the individual orders.   Results for orders placed or performed in visit on 12/26/22   Streptococcus A Rapid Screen w/Reflex to PCR - Clinic Collect     Status: Normal    Specimen: Throat; Swab   Result Value Ref Range    Group A Strep antigen Negative Negative       ASSESSMENT  1  Viral URI  2 Status post kidney and pancreas transplant  3 Hypertension controlled  4 History type 1 diabetes with diabetic neuropathy  5 Diffuse thyromegally    Plan have him use zinc lozenges vitamin C supplements and fluids.  Size he continues to improve we will not treat  with additional medication if he gets worse may need reassessment.    This note was completed using Dragon voice recognition software.      Girish Wood MD  General Internal Medicine  [facility]  [phone]

## 2022-12-27 ENCOUNTER — LAB REQUISITION (OUTPATIENT)
Dept: LAB | Facility: CLINIC | Age: 62
End: 2022-12-27
Payer: MEDICARE

## 2022-12-27 ENCOUNTER — TELEPHONE (OUTPATIENT)
Dept: TRANSPLANT | Facility: CLINIC | Age: 62
End: 2022-12-27

## 2022-12-27 DIAGNOSIS — Z94.83 PANCREAS TRANSPLANTED (H): Primary | ICD-10-CM

## 2022-12-27 LAB — BKV DNA # SPEC NAA+PROBE: NOT DETECTED COPIES/ML

## 2022-12-27 NOTE — TELEPHONE ENCOUNTER
"ISSUE:   Message  Received: Yesterday  Alek Jaquez MD Harris, Kathleen, RN    Labs done today with Scr increased to 1.2 from previous baseline of 1. UPCR at baseline but no longer on lisinopril.     Lab results 12/26/22 reviewed.  - Creatinine elevation 1.23, baseline 1.0  - CRP inflammation marker elevated to 23.8 (ref range <5.0); ordered by Dr. Girish Wood.  - Strep test done and this result was negative; indicating patient is likely experiencing symptoms of illness. Notes reviewed. Dr. Wood seen patient for URI: \"4 day history of cough, upper respiratory congestion, and drainage.\" Negative home covid test.  - UPCR results elevated to 1.39; consistent with baseline proteinuria values.      PLAN:   Any recent illness/fever/known infection?  Constipation (pancreas)/Diarrhea?  Any new or missed medications?  Are you drinking 2-3L water/day?  Volume status/weight/edema?  Changes in UOP? Color?  Pain over graft sites?  BP/HR?    Please instruct to increase oral hydration and repeat BMP in 1 week. Check a good 12 hour trough drug level with these labs. Document BP and pulse. Ensure good blood glucose control. Reach out to Primary care provider if not improving (Primary care clinic: [phone]).    Romy Scherer, RN, BSN (covering for Avril Lancaster RN)  Solid Organ Transplant, Post Kidney and Pancreas  Transplant Care Coordinator  [phone]            "

## 2022-12-27 NOTE — TELEPHONE ENCOUNTER
Left message and sent mychart message to patient regarding:  Any recent illness/fever/known infection?  Constipation (pancreas)/Diarrhea?  Any new or missed medications?  Are you drinking 2-3L water/day?  Volume status/weight/edema?  Changes in UOP? Color?  Pain over graft sites?  BP/HR?     Please instruct to increase oral hydration and repeat BMP in 1 week. Check a good 12 hour trough drug level with these labs. Document BP and pulse. Ensure good blood glucose control. Reach out to Primary care provider if not improving (Primary care clinic: [phone]).

## 2022-12-27 NOTE — TELEPHONE ENCOUNTER
Patient replied via [employer].  Updated lab orders to repeat BMP in 1 week.

## 2022-12-28 LAB
DONOR IDENTIFICATION: NORMAL
DONOR IDENTIFICATION: NORMAL
DSA COMMENTS: NORMAL
DSA COMMENTS: NORMAL
DSA PRESENT: NO
DSA PRESENT: NO
DSA TEST METHOD: NORMAL
DSA TEST METHOD: NORMAL
ORGAN: NORMAL
ORGAN: NORMAL
SA 1 CELL: NORMAL
SA 1 TEST METHOD: NORMAL
SA 2 CELL: NORMAL
SA 2 TEST METHOD: NORMAL
SA1 HI RISK ABY: NORMAL
SA1 MOD RISK ABY: NORMAL
SA2 HI RISK ABY: NORMAL
SA2 MOD RISK ABY: NORMAL
UNACCEPTABLE ANTIGENS: NORMAL
UNOS CPRA: 0
ZZZSA 1  COMMENTS: NORMAL
ZZZSA 2 COMMENTS: NORMAL

## 2023-01-24 DIAGNOSIS — Z94.83 PANCREAS TRANSPLANTED (H): ICD-10-CM

## 2023-01-24 DIAGNOSIS — G62.9 PERIPHERAL NEUROPATHY: ICD-10-CM

## 2023-01-24 DIAGNOSIS — K21.9 ESOPHAGEAL REFLUX: ICD-10-CM

## 2023-01-24 DIAGNOSIS — Z94.0 S/P KIDNEY TRANSPLANT: ICD-10-CM

## 2023-01-27 RX ORDER — ASCORBIC ACID 500 MG
TABLET ORAL
Qty: 180 TABLET | Refills: 1 | Status: SHIPPED | OUTPATIENT
Start: 2023-01-27 | End: 2023-03-28

## 2023-02-27 DIAGNOSIS — Z94.83 PANCREAS REPLACED BY TRANSPLANT (H): ICD-10-CM

## 2023-02-27 DIAGNOSIS — Z94.0 KIDNEY REPLACED BY TRANSPLANT: Primary | ICD-10-CM

## 2023-02-27 RX ORDER — MYCOPHENOLATE MOFETIL 500 MG/1
500 TABLET, FILM COATED ORAL 2 TIMES DAILY
Qty: 60 TABLET | Refills: 3 | Status: SHIPPED | OUTPATIENT
Start: 2023-02-27 | End: 2023-06-14

## 2023-02-28 DIAGNOSIS — Z94.0 KIDNEY REPLACED BY TRANSPLANT: ICD-10-CM

## 2023-02-28 RX ORDER — VITAMIN B COMPLEX
25 TABLET ORAL DAILY
Qty: 90 TABLET | Refills: 2 | Status: SHIPPED | OUTPATIENT
Start: 2023-02-28 | End: 2023-03-28

## 2023-02-28 NOTE — TELEPHONE ENCOUNTER
Vitamin D3 (CHOLECALCIFEROL) 25 mcg (1000 units) tablet      Last Written Prescription Date:  2/16/22  Last Fill Quantity: 90,   # refills: 4  Last Office Visit : 12/26/22  Future Office visit:  none

## 2023-03-24 DIAGNOSIS — Z94.0 S/P KIDNEY TRANSPLANT: ICD-10-CM

## 2023-03-24 DIAGNOSIS — Z94.83 PANCREAS TRANSPLANTED (H): ICD-10-CM

## 2023-03-24 DIAGNOSIS — E78.00 HIGH CHOLESTEROL: ICD-10-CM

## 2023-03-24 DIAGNOSIS — Z94.0 KIDNEY TRANSPLANTED: ICD-10-CM

## 2023-03-27 ENCOUNTER — TELEPHONE (OUTPATIENT)
Dept: TRANSPLANT | Facility: CLINIC | Age: 63
End: 2023-03-27
Payer: MEDICARE

## 2023-03-27 DIAGNOSIS — Z94.83 PANCREAS REPLACED BY TRANSPLANT (H): Primary | ICD-10-CM

## 2023-03-27 DIAGNOSIS — Z94.0 KIDNEY REPLACED BY TRANSPLANT: ICD-10-CM

## 2023-03-27 RX ORDER — SODIUM BICARBONATE 650 MG/1
1950 TABLET ORAL 2 TIMES DAILY
Qty: 180 TABLET | Refills: 3 | Status: SHIPPED | OUTPATIENT
Start: 2023-03-27 | End: 2023-07-24

## 2023-03-28 ENCOUNTER — TELEPHONE (OUTPATIENT)
Dept: INTERNAL MEDICINE | Facility: CLINIC | Age: 63
End: 2023-03-28
Payer: MEDICARE

## 2023-03-28 DIAGNOSIS — Z94.83 PANCREAS TRANSPLANTED (H): ICD-10-CM

## 2023-03-28 DIAGNOSIS — G62.9 PERIPHERAL NEUROPATHY: ICD-10-CM

## 2023-03-28 DIAGNOSIS — Z94.0 S/P KIDNEY TRANSPLANT: ICD-10-CM

## 2023-03-28 DIAGNOSIS — E78.00 HIGH CHOLESTEROL: ICD-10-CM

## 2023-03-28 DIAGNOSIS — Z94.0 KIDNEY TRANSPLANTED: ICD-10-CM

## 2023-03-28 DIAGNOSIS — Z94.0 KIDNEY REPLACED BY TRANSPLANT: ICD-10-CM

## 2023-03-28 RX ORDER — VITAMIN B COMPLEX
25 TABLET ORAL DAILY
Qty: 30 TABLET | Refills: 2 | Status: SHIPPED | OUTPATIENT
Start: 2023-03-28

## 2023-03-28 RX ORDER — ASPIRIN 81 MG/1
TABLET, COATED ORAL
Qty: 90 TABLET | Refills: 0 | Status: SHIPPED | OUTPATIENT
Start: 2023-03-28 | End: 2023-03-28

## 2023-03-28 RX ORDER — ATORVASTATIN CALCIUM 10 MG/1
10 TABLET, FILM COATED ORAL DAILY
Qty: 30 TABLET | Refills: 0 | Status: SHIPPED | OUTPATIENT
Start: 2023-03-28 | End: 2023-06-16

## 2023-03-28 RX ORDER — ASCORBIC ACID 500 MG
1000 TABLET ORAL DAILY
Qty: 30 TABLET | Refills: 1 | Status: SHIPPED | OUTPATIENT
Start: 2023-03-28

## 2023-03-28 RX ORDER — ATORVASTATIN CALCIUM 10 MG/1
10 TABLET, FILM COATED ORAL DAILY
Qty: 90 TABLET | Refills: 0 | Status: SHIPPED | OUTPATIENT
Start: 2023-03-28 | End: 2023-03-28

## 2023-03-28 NOTE — TELEPHONE ENCOUNTER
[facility] Call Center    Phone Message    May a detailed message be left on voicemail: yes     Reason for Call: Medication Question or concern regarding medication   Prescription Clarification  Name of Medication:   1. ASPIRIN LOW DOSE 81 MG EC tablet  2. atorvastatin (LIPITOR) 10 MG tablet  3. vitamin C (ASCORBIC ACID) 500 MG tablet  4. Vitamin D3 (CHOLECALCIFEROL) 25 mcg (1000 units) tablet  Prescribing Provider: PCP:  Rosio Salas MD   Pharmacy: [facility] - [place], [place] - [address]   What on the order needs clarification?   The  pharmacy was calling to ask us to only fill a 30 day supply for the patient instead of 90 day supply, the reason is because they have determined that it s too much for the patient to handle, there office did received paperwork pertaining to this if there s any questions or concerns please call thank you.          Action Taken: Message routed to:  [facility] pcc    Travel Screening: Not Applicable

## 2023-03-28 NOTE — TELEPHONE ENCOUNTER
30 day supplies of these medications sent to pharmacy.     TERRENCE KENDRICK RN on 3/28/2023 at 1:50 PM

## 2023-03-28 NOTE — TELEPHONE ENCOUNTER
ATORVASTATIN  Last Written Prescription Date:  11/14/2022  Last Fill Quantity: 90,   # refills: 0  Last Office Visit :6/21/21- LastLogeais-2021 12/26/22 [facility] Office visit:  Not on file     Routing refill request to provider for review/approval because:   Patient has had 90 day ihsan refill, LDL past due. Last saw Dr Salas 6/21/21( last PCC 12/26/22)  Lab Test 09/20/21  0810   *

## 2023-03-28 NOTE — TELEPHONE ENCOUNTER
Pt was last seen in clinic on 12/26/22. Pt last had atorvastatin (LIPITOR) 10 MG tablet refilled on 11/14/22 for a 90 day supply by PCP. Due for refill 2/12/23. Medication refill sent to pharmacy. LDL ordered. Routed to clinical coordinators to assist with scheduling lab.     TERRENCE KENDRICK RN on 3/28/2023 at 12:15 PM

## 2023-03-28 NOTE — TELEPHONE ENCOUNTER
Issue:  Overdue for transplant labs.  Most recent creatinine elevated in December.    Plan:  Call Jose Alfredo:  Encourage good hydration and repeat labs in the next week.        LPN Task:  Please call with above plan.  Thanks!    
Left message for patient and sent current lab letter to patient via [employer].  
English

## 2023-03-30 ENCOUNTER — LAB (OUTPATIENT)
Dept: LAB | Facility: CLINIC | Age: 63
End: 2023-03-30
Payer: MEDICARE

## 2023-03-30 DIAGNOSIS — Z94.0 KIDNEY REPLACED BY TRANSPLANT: ICD-10-CM

## 2023-03-30 DIAGNOSIS — E78.00 HIGH CHOLESTEROL: ICD-10-CM

## 2023-03-30 DIAGNOSIS — Z94.83 PANCREAS REPLACED BY TRANSPLANT (H): ICD-10-CM

## 2023-03-30 DIAGNOSIS — Z48.298 AFTERCARE FOLLOWING ORGAN TRANSPLANT: ICD-10-CM

## 2023-03-30 DIAGNOSIS — R93.429 ABNORMAL ULTRASOUND OF KIDNEY: ICD-10-CM

## 2023-03-30 DIAGNOSIS — Z20.828 CONTACT WITH AND (SUSPECTED) EXPOSURE TO OTHER VIRAL COMMUNICABLE DISEASES: ICD-10-CM

## 2023-03-30 DIAGNOSIS — Z79.899 OTHER LONG TERM (CURRENT) DRUG THERAPY: ICD-10-CM

## 2023-03-30 LAB
AMYLASE SERPL-CCNC: 189 U/L (ref 28–100)
ANION GAP SERPL CALCULATED.3IONS-SCNC: 11 MMOL/L (ref 7–15)
BUN SERPL-MCNC: 21.9 MG/DL (ref 8–23)
CALCIUM SERPL-MCNC: 9.1 MG/DL (ref 8.8–10.2)
CHLORIDE SERPL-SCNC: 115 MMOL/L (ref 98–107)
CHOLEST SERPL-MCNC: 122 MG/DL
CREAT SERPL-MCNC: 0.95 MG/DL (ref 0.67–1.17)
DEPRECATED HCO3 PLAS-SCNC: 18 MMOL/L (ref 22–29)
ERYTHROCYTE [DISTWIDTH] IN BLOOD BY AUTOMATED COUNT: 14.4 % (ref 10–15)
GFR SERPL CREATININE-BSD FRML MDRD: >90 ML/MIN/1.73M2
GLUCOSE SERPL-MCNC: 114 MG/DL (ref 70–99)
HCT VFR BLD AUTO: 41.4 % (ref 40–53)
HDLC SERPL-MCNC: 42 MG/DL
HGB BLD-MCNC: 12.9 G/DL (ref 13.3–17.7)
LDLC SERPL CALC-MCNC: 62 MG/DL
LIPASE SERPL-CCNC: 190 U/L (ref 13–60)
MCH RBC QN AUTO: 27.5 PG (ref 26.5–33)
MCHC RBC AUTO-ENTMCNC: 31.2 G/DL (ref 31.5–36.5)
MCV RBC AUTO: 88 FL (ref 78–100)
NONHDLC SERPL-MCNC: 80 MG/DL
PLATELET # BLD AUTO: 176 10E3/UL (ref 150–450)
POTASSIUM SERPL-SCNC: 4.4 MMOL/L (ref 3.4–5.3)
RBC # BLD AUTO: 4.69 10E6/UL (ref 4.4–5.9)
SODIUM SERPL-SCNC: 144 MMOL/L (ref 136–145)
TACROLIMUS BLD-MCNC: 5 UG/L (ref 5–15)
TME LAST DOSE: NORMAL H
TME LAST DOSE: NORMAL H
TRIGL SERPL-MCNC: 91 MG/DL
WBC # BLD AUTO: 6.8 10E3/UL (ref 4–11)

## 2023-03-30 PROCEDURE — 36415 COLL VENOUS BLD VENIPUNCTURE: CPT | Performed by: PATHOLOGY

## 2023-03-30 PROCEDURE — 80197 ASSAY OF TACROLIMUS: CPT | Performed by: INTERNAL MEDICINE

## 2023-03-30 PROCEDURE — 83690 ASSAY OF LIPASE: CPT | Performed by: PATHOLOGY

## 2023-03-30 PROCEDURE — 82150 ASSAY OF AMYLASE: CPT | Performed by: PATHOLOGY

## 2023-03-30 PROCEDURE — 87799 DETECT AGENT NOS DNA QUANT: CPT | Performed by: INTERNAL MEDICINE

## 2023-03-30 PROCEDURE — 85027 COMPLETE CBC AUTOMATED: CPT | Performed by: PATHOLOGY

## 2023-03-30 PROCEDURE — 80061 LIPID PANEL: CPT | Performed by: PATHOLOGY

## 2023-03-30 PROCEDURE — 80048 BASIC METABOLIC PNL TOTAL CA: CPT | Performed by: PATHOLOGY

## 2023-03-31 ENCOUNTER — TELEPHONE (OUTPATIENT)
Dept: TRANSPLANT | Facility: CLINIC | Age: 63
End: 2023-03-31

## 2023-03-31 DIAGNOSIS — Z94.83 PANCREAS REPLACED BY TRANSPLANT (H): Primary | ICD-10-CM

## 2023-03-31 DIAGNOSIS — R74.8 INCREASED SERUM LIPASE LEVEL: ICD-10-CM

## 2023-03-31 LAB — BKV DNA # SPEC NAA+PROBE: NOT DETECTED COPIES/ML

## 2023-03-31 NOTE — TELEPHONE ENCOUNTER
ISSUE  Lipase/amylase significantly elevated  Bicarb low 18    OUTCOME  Reviewed with pt who confirms he has had a distended and uncomfortable abdomen r/t no bowel movement in a couple of days which is not normal for him.     Advised increased hydration and he will use miralax for a few days until he has moved stool and obtain soften consistency BM.     Repeat lab orders placed and scheduled pt for lab 4/12/23.     Discussed Dr. Logan request for Prospera. Pt agreeable to this. Will confirm coverage with finance team and route to primary [facility] to work on requisition.           ADDENDUM:  Beverley Lerner Brittany J, FRANK; Jamila Lancaster RN  He does have coverage :)     Beverley Evans         ----- Message -----   From: Peggy Barajas RN   Sent: 3/31/2023   2:04 PM CDT   To: Jamila Lancaster, RN, Beverley Lerner   Subject: prospera                                         Can you confirm pt has coverage for prospera?     Please reply to Avril Lancaster.     Thanks!   Peggy

## 2023-03-31 NOTE — TELEPHONE ENCOUNTER
----- Message from Alek Jaquez MD sent at 3/30/2023  7:01 AM CDT -----  Lipase elevated. Please ask if constipated. Repeat labs in ~2 weeks, obtain prospera (on medicare), make sure he is taking prescribed bicarb

## 2023-04-08 ENCOUNTER — HEALTH MAINTENANCE LETTER (OUTPATIENT)
Age: 63
End: 2023-04-08

## 2023-04-12 ENCOUNTER — LAB (OUTPATIENT)
Dept: LAB | Facility: CLINIC | Age: 63
End: 2023-04-12
Payer: MEDICARE

## 2023-04-12 ENCOUNTER — TELEPHONE (OUTPATIENT)
Dept: TRANSPLANT | Facility: CLINIC | Age: 63
End: 2023-04-12

## 2023-04-12 DIAGNOSIS — R74.8 INCREASED SERUM LIPASE LEVEL: Primary | ICD-10-CM

## 2023-04-12 DIAGNOSIS — R74.8 INCREASED SERUM LIPASE LEVEL: ICD-10-CM

## 2023-04-12 DIAGNOSIS — Z94.83 PANCREAS REPLACED BY TRANSPLANT (H): ICD-10-CM

## 2023-04-12 DIAGNOSIS — Z94.83 PANCREAS TRANSPLANTED (H): ICD-10-CM

## 2023-04-12 LAB
AMYLASE SERPL-CCNC: 167 U/L (ref 28–100)
ANION GAP SERPL CALCULATED.3IONS-SCNC: 11 MMOL/L (ref 7–15)
BUN SERPL-MCNC: 32.4 MG/DL (ref 8–23)
CALCIUM SERPL-MCNC: 9.2 MG/DL (ref 8.8–10.2)
CHLORIDE SERPL-SCNC: 113 MMOL/L (ref 98–107)
CREAT SERPL-MCNC: 1.06 MG/DL (ref 0.67–1.17)
DEPRECATED HCO3 PLAS-SCNC: 17 MMOL/L (ref 22–29)
ERYTHROCYTE [DISTWIDTH] IN BLOOD BY AUTOMATED COUNT: 13.9 % (ref 10–15)
GFR SERPL CREATININE-BSD FRML MDRD: 80 ML/MIN/1.73M2
GLUCOSE SERPL-MCNC: 105 MG/DL (ref 70–99)
HCT VFR BLD AUTO: 39.1 % (ref 40–53)
HGB BLD-MCNC: 12.5 G/DL (ref 13.3–17.7)
LIPASE SERPL-CCNC: 169 U/L (ref 13–60)
MCH RBC QN AUTO: 28.2 PG (ref 26.5–33)
MCHC RBC AUTO-ENTMCNC: 32 G/DL (ref 31.5–36.5)
MCV RBC AUTO: 88 FL (ref 78–100)
PLATELET # BLD AUTO: 153 10E3/UL (ref 150–450)
POTASSIUM SERPL-SCNC: 4 MMOL/L (ref 3.4–5.3)
RBC # BLD AUTO: 4.44 10E6/UL (ref 4.4–5.9)
SODIUM SERPL-SCNC: 141 MMOL/L (ref 136–145)
TACROLIMUS BLD-MCNC: 7.4 UG/L (ref 5–15)
TME LAST DOSE: NORMAL H
TME LAST DOSE: NORMAL H
WBC # BLD AUTO: 6.9 10E3/UL (ref 4–11)

## 2023-04-12 PROCEDURE — 85027 COMPLETE CBC AUTOMATED: CPT | Performed by: PATHOLOGY

## 2023-04-12 PROCEDURE — 83690 ASSAY OF LIPASE: CPT | Performed by: PATHOLOGY

## 2023-04-12 PROCEDURE — 82150 ASSAY OF AMYLASE: CPT | Performed by: PATHOLOGY

## 2023-04-12 PROCEDURE — 80048 BASIC METABOLIC PNL TOTAL CA: CPT | Performed by: PATHOLOGY

## 2023-04-12 PROCEDURE — 80197 ASSAY OF TACROLIMUS: CPT | Performed by: INTERNAL MEDICINE

## 2023-04-12 PROCEDURE — 36415 COLL VENOUS BLD VENIPUNCTURE: CPT | Performed by: PATHOLOGY

## 2023-04-12 NOTE — TELEPHONE ENCOUNTER
ISSUE  Repeat creatinine remains elevated at 169  Bicarb level low at 17-should be taking sodium bicarbonate 1,950 mg BID.    PLAN  Alek Jaquez MD Harris, Kathleen, RN  High lipase again. Prospera pending. Please have him do bowel cleanout and get KUB     Assess for diarrhea?  Taking sodium bicarbonate 1,950 (3 tabs) BID as prescribed?      OUTCOME  Call placed to Jose Alfredo. No answer. No VM.   Will try back.

## 2023-04-14 ENCOUNTER — TELEPHONE (OUTPATIENT)
Dept: TRANSPLANT | Facility: CLINIC | Age: 63
End: 2023-04-14

## 2023-04-14 ENCOUNTER — ANCILLARY PROCEDURE (OUTPATIENT)
Dept: GENERAL RADIOLOGY | Facility: CLINIC | Age: 63
End: 2023-04-14
Attending: INTERNAL MEDICINE
Payer: MEDICARE

## 2023-04-14 DIAGNOSIS — Z94.83 PANCREAS TRANSPLANTED (H): ICD-10-CM

## 2023-04-14 DIAGNOSIS — R74.8 INCREASED SERUM LIPASE LEVEL: ICD-10-CM

## 2023-04-14 PROCEDURE — 74019 RADEX ABDOMEN 2 VIEWS: CPT | Mod: GC | Performed by: RADIOLOGY

## 2023-04-14 NOTE — TELEPHONE ENCOUNTER
Ofelia from [facility] calling with some questions, was the Donor a relative, and is patient still being seen at the

## 2023-04-14 NOTE — TELEPHONE ENCOUNTER
Alek Jaquez MD Harris, Kathleen, RN  Moderate stool burden. Needs to poop. Senna/docusate twice daily and miralax daily

## 2023-04-14 NOTE — TELEPHONE ENCOUNTER
Alek Jaquez MD Harris, Kathleen, RN  Moderate stool burden. Needs to poop. Senna/docusate twice daily and miralax daily       OUTCOME  Spoke to pt who confirms he does go missed days without bowel movement    He will  senna or docusate and local pharmacy and begin regiment right away. Advised he can take miralax twice daily for the first few days if needed and should continue daily use of this regimen until he is having 2-3 soft bowel movements daily.     Pt confirms he is taking sodium bicarb 1950mg BID, will route to provider for review if dose should be increased    Labs ordered for repeat in 1 week and scheduled pt for lab appt

## 2023-04-18 LAB
ALLOSURE DD-CFDNA: NORMAL %
PROSPERA TRANSPLANT MONITORING: 0.36 %

## 2023-04-20 ENCOUNTER — DOCUMENTATION ONLY (OUTPATIENT)
Dept: INTERNAL MEDICINE | Facility: CLINIC | Age: 63
End: 2023-04-20
Payer: MEDICARE

## 2023-04-20 DIAGNOSIS — Z94.0 KIDNEY REPLACED BY TRANSPLANT: ICD-10-CM

## 2023-04-20 DIAGNOSIS — Z94.83 PANCREAS REPLACED BY TRANSPLANT (H): ICD-10-CM

## 2023-04-20 RX ORDER — TACROLIMUS 1 MG/1
CAPSULE, GELATIN COATED ORAL
Qty: 60 CAPSULE | Refills: 11 | Status: SHIPPED | OUTPATIENT
Start: 2023-04-20 | End: 2024-04-17

## 2023-04-20 NOTE — PROGRESS NOTES
Type of Form Received: order    Form Received (Date) 4/20/23   Form Filled out Yes 4/21/23   Placed in provider folder Yes

## 2023-05-04 ENCOUNTER — TELEPHONE (OUTPATIENT)
Dept: INTERNAL MEDICINE | Facility: CLINIC | Age: 63
End: 2023-05-04
Payer: MEDICARE

## 2023-05-22 ENCOUNTER — TELEPHONE (OUTPATIENT)
Dept: TRANSPLANT | Facility: CLINIC | Age: 63
End: 2023-05-22
Payer: MEDICARE

## 2023-05-22 NOTE — LETTER
OUTPATIENT LABORATORY TEST ORDER    Patient Name: Jose Alfredo Tomlinson  Transplant Date: 10/29/2007   YOB: 1960                                         Issue Date & Time: 5/22/2023  10:26 AM  [facility] MR: [id number] Exp. Date (1 year after date issued)      Diagnoses: Kidney Transplant (ICD-10  Z94.0)   Long term use of medications (ICD-10  Z79.899)     Lab results to be available on the same day drawn.   Patient should release information to the [facility].  Please fax to the Transplant Center at [phone].    Monthly   ?Hemogram and Platelet  ?Basic Metabolic Panel (Sodium, Potassium, Chloride, CO2, Creatinine, Urea Nitrogen,     Glucose,   Calcium)         ?/Tacrolimus/Prograf drug level          ?Amylase         ?Lipase                   Every 6 months                                          ?Urine for protein/creatinine       If you have any questions, please call [facility] at [phone] or [phone].    Please fax labs to [phone]    .

## 2023-05-22 NOTE — TELEPHONE ENCOUNTER
ISSUE  Due for labs.  Needs repeat lipase after elevated level on last set of labs.        PLAN  Call Jose Alfredo:  Encourage good hydration and good bowel regimen. Repeat labs in the next week including a good 12 hour drug level.      LPN Task:  PLease call with above plan.  Thanks!

## 2023-05-22 NOTE — TELEPHONE ENCOUNTER
Left message for patient and sent mychart message regarding:  Due for labs.  Needs repeat lipase after elevated level on last set of labs.           PLAN  Call Jose Alfredo:  Encourage good hydration and good bowel regimen. Repeat labs in the next week including a good 12 hour drug level.

## 2023-05-25 ENCOUNTER — OFFICE VISIT (OUTPATIENT)
Dept: INTERNAL MEDICINE | Facility: CLINIC | Age: 63
End: 2023-05-25
Payer: MEDICARE

## 2023-05-25 VITALS
SYSTOLIC BLOOD PRESSURE: 185 MMHG | DIASTOLIC BLOOD PRESSURE: 81 MMHG | HEIGHT: 72 IN | BODY MASS INDEX: 23.89 KG/M2 | WEIGHT: 176.4 LBS

## 2023-05-25 DIAGNOSIS — E10.42 DIABETIC POLYNEUROPATHY ASSOCIATED WITH TYPE 1 DIABETES MELLITUS (H): ICD-10-CM

## 2023-05-25 DIAGNOSIS — Z23 NEED FOR PNEUMOCOCCAL VACCINATION: ICD-10-CM

## 2023-05-25 DIAGNOSIS — Z00.00 ROUTINE GENERAL MEDICAL EXAMINATION AT A HEALTH CARE FACILITY: Primary | ICD-10-CM

## 2023-05-25 DIAGNOSIS — I15.1 HTN, KIDNEY TRANSPLANT RELATED: ICD-10-CM

## 2023-05-25 DIAGNOSIS — E55.9 VITAMIN D DEFICIENCY: ICD-10-CM

## 2023-05-25 DIAGNOSIS — Z12.5 SCREENING FOR PROSTATE CANCER: ICD-10-CM

## 2023-05-25 DIAGNOSIS — Z94.0 HTN, KIDNEY TRANSPLANT RELATED: ICD-10-CM

## 2023-05-25 PROCEDURE — G0009 ADMIN PNEUMOCOCCAL VACCINE: HCPCS | Performed by: INTERNAL MEDICINE

## 2023-05-25 PROCEDURE — 99214 OFFICE O/P EST MOD 30 MIN: CPT | Mod: 25 | Performed by: INTERNAL MEDICINE

## 2023-05-25 PROCEDURE — 90677 PCV20 VACCINE IM: CPT | Performed by: INTERNAL MEDICINE

## 2023-05-25 NOTE — NURSING NOTE
"Jose Alfredo Tomlinson is a 62 year old male patient that presents today in clinic for the following:    Chief Complaint   Patient presents with     Dme     Pt needing approval for brake for walker     The patient's allergies and medications were reviewed as noted. A set of vitals were recorded as noted without incident: BP (!) 185/81 (BP Location: Right arm, Patient Position: Sitting, Cuff Size: Adult Regular)   Ht 1.829 m (6' 0.01\")   Wt 80 kg (176 lb 6.4 oz)   BMI 23.92 kg/m  . The patient does not have any other questions for the provider.    Miko Cook, EMT 11:32 AM on 5/25/2023   "

## 2023-05-25 NOTE — PROGRESS NOTES
History of Present Illness:  Mr. Tomlinson is a 62 year old male who presents for  Chief Complaint   Patient presents with     Dme     Pt needing approval for brake for walker     PMH notable for history of DM1 c/b retinopathy, neuropathy, PAD, ED s/p SPK 2007, HTN, now euglycemic.     Things are going well at home.  No concerns.  Has neuropathy, bilat foot drop. Wears AFOs, uses walker. Needs brakes repaired.    Hemoglobin A1C   Date Value Ref Range Status   12/26/2022 5.3 <5.7 % Final     Comment:     Normal <5.7%   Prediabetes 5.7-6.4%    Diabetes 6.5% or higher     Note: Adopted from ADA consensus guidelines.   09/20/2021 5.1 0.0 - 5.6 % Final     Comment:     Normal <5.7%   Prediabetes 5.7-6.4%    Diabetes 6.5% or higher     Note: Adopted from ADA consensus guidelines.   12/22/2020 4.9 0 - 5.6 % Final     Comment:     Normal <5.7% Prediabetes 5.7-6.4%  Diabetes 6.5% or higher - adopted from ADA   consensus guidelines.     02/01/2019 4.9 0 - 5.6 % Final     Comment:     Normal <5.7% Prediabetes 5.7-6.4%  Diabetes 6.5% or higher - adopted from ADA   consensus guidelines.     01/25/2016 4.9 4.3 - 6.0 % Final       He has a list of eye doctors he will see, and audiology.  BP elevated today, states he's wound up. He doesn't routinely check at home. Advised we could do home monitor but he declines.  Discussed importance of BP control for kidney health and he states he will monitor.  Discussed vaccinations, health screenings, Derm visits.      Routine Health Maintenence:  PSA: No results found for: PSA  ordered  AAA Screening (65-75 yrs): n/a  Lipids:   Recent Labs   Lab Test 03/30/23  0629 09/20/21  0810   CHOL 122 222*   HDL 42 53   LDL 62 149*   TRIG 91 98     Lung Ca Screening (>20 pk age 50-80): n/a  Colonoscopy (45-75 yrs): 3/19 - Five less than 1 mm polyps in the cecum, removed with                        a cold snare. Resected and retrieved.                        - Diverticulosis in the sigmoid colon.   Dexa  (>65W or 70M yrs): deferred      Review of external notes as documented above                   A detailed Review of Systems was performed, verified and is negative except as documented in the HPI.  All health questionnaires were reviewed, verified and relevant information documented above.    Past Medical History:  Past Medical History:   Diagnosis Date     Cataracts      Foot drop      Gastroparesis      GERD (gastroesophageal reflux disease)      History of diabetes mellitus, type I      Hyperlipidemia      Hypertension      Pancreas transplanted (H) 2007    hx of rejection in past      Peripheral neuropathy      S/P kidney transplant 2007       Past Surgical History:  Past Surgical History:   Procedure Laterality Date     CATARACT IOL, RT/LT Bilateral      COLONOSCOPY  12/31/2013    Procedure: COMBINED COLONOSCOPY, SINGLE BIOPSY/POLYPECTOMY BY BIOPSY;  COLONOSCOPY;  Surgeon: Isac Colby MD;  Location:  GI     ESOPHAGOSCOPY, GASTROSCOPY, DUODENOSCOPY (EGD), COMBINED  1/20/2012    Procedure:COMBINED ESOPHAGOSCOPY, GASTROSCOPY, DUODENOSCOPY (EGD); Surgeon:GAB MARTIN; Location:[facility]     IR MISCELLANEOUS PROCEDURE  8/12/2003     IR MISCELLANEOUS PROCEDURE  8/12/2003     IR VENOUS PTA  8/12/2003     REPAIR PTOSIS Left 3/15/2019    Procedure: LEFT UPPER EYELID PTOSIS REPAIR;  Surgeon: Rj Jose MD;  Location:  OR     TRANSPLANT  2007    K/P       Active Meds:  Current Outpatient Medications   Medication     aspirin (ASPIRIN LOW DOSE) 81 MG EC tablet     atorvastatin (LIPITOR) 10 MG tablet     CELLCEPT (BRAND) 500 MG tablet     omeprazole (PRILOSEC) 20 MG DR capsule     order for DME     PROGRAF (BRAND) 0.5 MG capsule     PROGRAF (BRAND) 1 MG capsule     sodium bicarbonate 650 MG tablet     vitamin C (ASCORBIC ACID) 500 MG tablet     vitamin C (ASCORBIC ACID) 500 MG tablet     Vitamin D3 (CHOLECALCIFEROL) 25 mcg (1000 units) tablet     ondansetron (ZOFRAN-ODT) 4 MG ODT tab     Current  "Facility-Administered Medications   Medication     lidocaine 1% with EPINEPHrine 1:100,000 injection 3 mL        Allergies:  Patient has no known allergies.    Family History:  family history includes Arthritis in his mother; Cancer in his maternal grandfather and paternal grandfather; Diabetes in his father, paternal grandfather, and son.    Social History:  Social History     Tobacco Use     Smoking status: Never     Smokeless tobacco: Never   Substance Use Topics     Alcohol use: No     Drug use: No       Physical Exam:  Vitals: BP (!) 185/81 (BP Location: Right arm, Patient Position: Sitting, Cuff Size: Adult Regular)   Ht 1.829 m (6' 0.01\")   Wt 80 kg (176 lb 6.4 oz)   BMI 23.92 kg/m    Constitutional: Alert, oriented, pleasant, no acute distress  Head: Normocephalic, atraumatic  Eyes: Extra-ocular movements intact, pupils equally round and reactive bilaterally, no scleral icterus  ENT: Oropharynx clear, moist mucus membranes, fair dentition  Neck: Supple, no lymphadenopathy  Cardiovascular: Regular rate and rhythm, no murmurs, rubs or gallops, peripheral pulses full/symmetric  Respiratory: Good air movement bilaterally, lungs clear, no wheezes/rales/rhonchi  Musculoskeletal: No edema, normal muscle tone, normal gait  Neurologic: Alert and oriented, cranial nerves 2-12 intact, grossly non-focal  Skin: No rashes/lesions  Psychiatric: normal mentation, affect and mood      Diagnostics:  Labs reviewed in Epic          Assessment and Plan:  Jose Alfredo was seen today for dme.    Diagnoses and all orders for this visit:    Routine general medical examination at a health care facility  Routine health care reviewed and updated as appropriate.    Need for pneumococcal vaccination  Advised shingrix and Td at pharmacy given coverage.  -     PNEUMOCOCCAL 20 VALENT CONJUGATE (PREVNAR 20)    Screening for prostate cancer  -     PSA, screen; Future    Elevated blood pressure   He doesn't routinely check at home. Advised we " could do home monitor but he declines.  Discussed importance of BP control for kidney health and he states he will monitor.    Vitamin D deficiency  -     Vitamin D Deficiency; Future    Diabetic polyneuropathy associated with type 1 diabetes mellitus (H)  Approve of walker repairs.          Rosio Salas MD  Internal Medicine

## 2023-06-13 DIAGNOSIS — E78.00 HIGH CHOLESTEROL: ICD-10-CM

## 2023-06-13 DIAGNOSIS — K21.9 ESOPHAGEAL REFLUX: ICD-10-CM

## 2023-06-13 DIAGNOSIS — Z94.83 PANCREAS TRANSPLANTED (H): ICD-10-CM

## 2023-06-13 DIAGNOSIS — Z94.0 S/P KIDNEY TRANSPLANT: ICD-10-CM

## 2023-06-13 DIAGNOSIS — Z94.0 KIDNEY TRANSPLANTED: ICD-10-CM

## 2023-06-14 DIAGNOSIS — Z94.0 KIDNEY REPLACED BY TRANSPLANT: ICD-10-CM

## 2023-06-14 DIAGNOSIS — Z94.83 PANCREAS REPLACED BY TRANSPLANT (H): Primary | ICD-10-CM

## 2023-06-14 RX ORDER — MYCOPHENOLATE MOFETIL 500 MG/1
500 TABLET, FILM COATED ORAL 2 TIMES DAILY
Qty: 60 TABLET | Refills: 0 | Status: SHIPPED | OUTPATIENT
Start: 2023-06-14 | End: 2023-08-16

## 2023-06-16 RX ORDER — ASPIRIN 81 MG/1
TABLET, COATED ORAL
Qty: 90 TABLET | Refills: 3 | Status: SHIPPED | OUTPATIENT
Start: 2023-06-16 | End: 2024-06-20

## 2023-06-16 RX ORDER — ATORVASTATIN CALCIUM 10 MG/1
TABLET, FILM COATED ORAL
Qty: 90 TABLET | Refills: 3 | Status: SHIPPED | OUTPATIENT
Start: 2023-06-16 | End: 2024-06-20

## 2023-07-01 ENCOUNTER — HEALTH MAINTENANCE LETTER (OUTPATIENT)
Age: 63
End: 2023-07-01

## 2023-07-05 ENCOUNTER — OFFICE VISIT (OUTPATIENT)
Dept: INTERNAL MEDICINE | Facility: CLINIC | Age: 63
End: 2023-07-05
Payer: MEDICARE

## 2023-07-05 VITALS
HEART RATE: 81 BPM | SYSTOLIC BLOOD PRESSURE: 199 MMHG | DIASTOLIC BLOOD PRESSURE: 125 MMHG | OXYGEN SATURATION: 95 % | BODY MASS INDEX: 24.31 KG/M2 | WEIGHT: 179.5 LBS | HEIGHT: 72 IN

## 2023-07-05 DIAGNOSIS — E10.40 DIABETIC NEUROPATHY, TYPE I DIABETES MELLITUS (H): Primary | ICD-10-CM

## 2023-07-05 DIAGNOSIS — Z94.0 KIDNEY REPLACED BY TRANSPLANT: ICD-10-CM

## 2023-07-05 DIAGNOSIS — I15.1 HTN, KIDNEY TRANSPLANT RELATED: ICD-10-CM

## 2023-07-05 DIAGNOSIS — Z94.0 HTN, KIDNEY TRANSPLANT RELATED: ICD-10-CM

## 2023-07-05 DIAGNOSIS — N28.1 KIDNEY CYSTS: Primary | ICD-10-CM

## 2023-07-05 DIAGNOSIS — Z48.298 AFTERCARE FOLLOWING ORGAN TRANSPLANT: ICD-10-CM

## 2023-07-05 PROCEDURE — 99213 OFFICE O/P EST LOW 20 MIN: CPT | Performed by: INTERNAL MEDICINE

## 2023-07-05 NOTE — NURSING NOTE
Jose Alfredo Tomlinson is a 63 year old male patient that presents today in clinic for the following:    Chief Complaint   Patient presents with     RECHECK     Forms      The patient's allergies and medications were reviewed as noted. A set of vitals were recorded as noted without incident. The patient does not have any other questions for the provider.    Herman Regalado, EMT at 12:20 PM on 7/5/2023

## 2023-07-05 NOTE — PROGRESS NOTES
"  Assessment & Plan     Diabetic neuropathy, type I diabetes mellitus (H)    Jose Alfredo uses AFOs and a walker to help with ambulation.  [employer] form completed and returned to patient, copy sent to scanning.     HTN, kidney transplant related    BP is again very high in clinic, but he reports home BPs are good and declines resuming medication.       26 minutes spent by me on the date of the encounter doing chart review, history and exam, documentation and further activities per the note      Arnaldo Ibrahim MD  [facility] INTERNAL MEDICINE [place]    Subjective   Jose Alfredo is a 63 year old, presenting for the following health issues:  RECHECK (Forms )    HPI     Jose Alfredo has a history of neuropathy and bilateral foot drop for which he uses AFOs and a walker and needs transportation forms completed for [employer].  Had these completed about 4 and 7 years ago. He also reports vision issues, has tried glasses but his vision changes and they no longer works.  He has trouble maneuvering the walker due to vision impairment.     BP was high at last office visit at 185/81 and plan was to recheck.  He has previously been on losartan, lisinopril, amlodipine.  Lisinopril was previously held due to KAUR and not resumed. I do not see notes on why losartan and amlodipine were stopped.     He reports home blood pressures have been okay.      Review of Systems   Constitutional, MSK systems are negative, except as otherwise noted.      Objective    BP (!) 199/125 (BP Location: Right arm, Patient Position: Sitting, Cuff Size: Adult Regular)   Pulse 81   Ht 1.829 m (6' 0.01\")   Wt 81.4 kg (179 lb 8 oz)   SpO2 95%   BMI 24.34 kg/m    Body mass index is 24.34 kg/m .  Physical Exam   GENERAL: healthy, alert, no distress and using a walker to ambulate  PSYCH: mentation appears normal, affect normal/bright               "

## 2023-07-12 ENCOUNTER — TELEPHONE (OUTPATIENT)
Dept: TRANSPLANT | Facility: CLINIC | Age: 63
End: 2023-07-12
Payer: MEDICARE

## 2023-07-12 NOTE — TELEPHONE ENCOUNTER
Call placed to patient. Patient v\u to complete full set of transplant labs in the next 1-2 weeks. Orders in place.

## 2023-07-12 NOTE — TELEPHONE ENCOUNTER
Issue:  Overdue for transplant labs.  Previous lipase levels in March/April elevated.    Plan:  Call Jose Alfredo:  Request a full set of transplant labs, including a good drug level, be drawn in the next 1-2 weeks.  Educate the patient on the importance of lab compliance in monitoring the health of their transplant.    LPN Task:  Please call with above plan.  Thanks!

## 2023-07-24 DIAGNOSIS — Z94.83 PANCREAS REPLACED BY TRANSPLANT (H): ICD-10-CM

## 2023-07-24 DIAGNOSIS — Z94.0 KIDNEY REPLACED BY TRANSPLANT: Primary | ICD-10-CM

## 2023-07-24 RX ORDER — SODIUM BICARBONATE 650 MG/1
1950 TABLET ORAL 2 TIMES DAILY
Qty: 180 TABLET | Refills: 0 | Status: SHIPPED | OUTPATIENT
Start: 2023-07-24 | End: 2023-08-16

## 2023-08-16 DIAGNOSIS — Z94.0 KIDNEY REPLACED BY TRANSPLANT: ICD-10-CM

## 2023-08-16 DIAGNOSIS — Z94.83 PANCREAS REPLACED BY TRANSPLANT (H): Primary | ICD-10-CM

## 2023-08-16 DIAGNOSIS — Z94.83 PANCREAS REPLACED BY TRANSPLANT (H): ICD-10-CM

## 2023-08-16 RX ORDER — MYCOPHENOLATE MOFETIL 500 MG/1
500 TABLET, FILM COATED ORAL 2 TIMES DAILY
Qty: 60 TABLET | Refills: 0 | Status: SHIPPED | OUTPATIENT
Start: 2023-08-16 | End: 2023-09-15

## 2023-08-18 RX ORDER — SODIUM BICARBONATE 650 MG/1
1950 TABLET ORAL 2 TIMES DAILY
Qty: 180 TABLET | Refills: 0 | Status: SHIPPED | OUTPATIENT
Start: 2023-08-18 | End: 2023-09-21

## 2023-09-15 DIAGNOSIS — Z94.83 PANCREAS REPLACED BY TRANSPLANT (H): ICD-10-CM

## 2023-09-15 DIAGNOSIS — Z94.0 KIDNEY REPLACED BY TRANSPLANT: Primary | ICD-10-CM

## 2023-09-15 RX ORDER — MYCOPHENOLATE MOFETIL 500 MG/1
500 TABLET, FILM COATED ORAL 2 TIMES DAILY
Qty: 60 TABLET | Refills: 0 | Status: SHIPPED | OUTPATIENT
Start: 2023-09-15 | End: 2023-10-19

## 2023-09-21 DIAGNOSIS — Z94.0 KIDNEY REPLACED BY TRANSPLANT: ICD-10-CM

## 2023-09-21 DIAGNOSIS — Z94.83 PANCREAS REPLACED BY TRANSPLANT (H): Primary | ICD-10-CM

## 2023-09-21 DIAGNOSIS — K21.9 ESOPHAGEAL REFLUX: ICD-10-CM

## 2023-09-21 RX ORDER — SODIUM BICARBONATE 650 MG/1
1950 TABLET ORAL 2 TIMES DAILY
Qty: 180 TABLET | Refills: 0 | Status: SHIPPED | OUTPATIENT
Start: 2023-09-21 | End: 2023-10-24

## 2023-09-21 NOTE — TELEPHONE ENCOUNTER
Pt is requesting new rx to be written for a 1 month supply only.    Omeprazole 20mg caps  Si bid  Qty: 60 refills: 11    Abi verify and send new rx    [facility]  [phone]

## 2023-09-22 NOTE — TELEPHONE ENCOUNTER
[facility] Call Center    Phone Message    May a detailed message be left on voicemail: yes     Reason for Call: Medication Question or concern regarding medication   Prescription Clarification  Name of Medication: Omeprazole 20mg Caps  Prescribing Provider: Dr Davis   Pharmacy:       [facility] - [place], [place] - [address]  [facility] MEDICAL [facility]PPL[facility]     What on the order needs clarification? Patient's prescription should have been for 60 caps for 1 month supply and year refills.       Action Taken: Message routed to:  [facility]    Travel Screening: Not Applicable

## 2023-10-16 ENCOUNTER — HOSPITAL ENCOUNTER (OUTPATIENT)
Dept: RESEARCH | Facility: CLINIC | Age: 63
Discharge: HOME | End: 2023-10-16
Attending: INTERNAL MEDICINE
Payer: MEDICARE

## 2023-10-16 DIAGNOSIS — Z00.6 EXAMINATION OF PARTICIPANT OR CONTROL IN CLINICAL RESEARCH: Primary | ICD-10-CM

## 2023-10-16 PROCEDURE — 510N000009 HC RESEARCH FACILITY, PER 15 MIN

## 2023-10-16 PROCEDURE — 510N000017 HC CRU PATIENT CARE, PER 15 MIN

## 2023-10-19 DIAGNOSIS — Z94.83 PANCREAS REPLACED BY TRANSPLANT (H): ICD-10-CM

## 2023-10-19 DIAGNOSIS — Z94.0 KIDNEY REPLACED BY TRANSPLANT: ICD-10-CM

## 2023-10-19 RX ORDER — MYCOPHENOLATE MOFETIL 500 MG/1
500 TABLET, FILM COATED ORAL 2 TIMES DAILY
Qty: 60 TABLET | Refills: 11 | Status: SHIPPED | OUTPATIENT
Start: 2023-10-19 | End: 2024-05-06

## 2023-10-21 NOTE — TELEPHONE ENCOUNTER
I have never seen this patient, patient's PCP is Rosio Salas MD.  Why is this refill response message routed to me??

## 2023-10-24 DIAGNOSIS — Z94.83 PANCREAS REPLACED BY TRANSPLANT (H): ICD-10-CM

## 2023-10-24 DIAGNOSIS — Z94.0 KIDNEY REPLACED BY TRANSPLANT: ICD-10-CM

## 2023-10-24 RX ORDER — SODIUM BICARBONATE 650 MG/1
1950 TABLET ORAL 2 TIMES DAILY
Qty: 180 TABLET | Refills: 0 | Status: SHIPPED | OUTPATIENT
Start: 2023-10-24 | End: 2023-11-24

## 2023-11-16 ENCOUNTER — HOSPITAL ENCOUNTER (OUTPATIENT)
Dept: RESEARCH | Facility: CLINIC | Age: 63
Discharge: HOME | End: 2023-11-16
Attending: INTERNAL MEDICINE | Admitting: INTERNAL MEDICINE
Payer: MEDICARE

## 2023-11-16 DIAGNOSIS — Z00.6 EXAMINATION OF PARTICIPANT OR CONTROL IN CLINICAL RESEARCH: Primary | ICD-10-CM

## 2023-11-16 PROCEDURE — 510N000009 HC RESEARCH FACILITY, PER 15 MIN

## 2023-11-16 PROCEDURE — 510N000017 HC CRU PATIENT CARE, PER 15 MIN

## 2023-11-17 NOTE — ADDENDUM NOTE
Encounter addended by: Angela Franz on: 11/17/2023 12:05 PM   Actions taken: Charge Capture section accepted

## 2023-11-20 ENCOUNTER — TELEPHONE (OUTPATIENT)
Dept: TRANSPLANT | Facility: CLINIC | Age: 63
End: 2023-11-20
Payer: MEDICARE

## 2023-11-20 NOTE — TELEPHONE ENCOUNTER
ISSUE:  [facility] SOT due for receipt of transplant labs.    PLAN / LPN Task:  Call to Jose Alfredo to confirm if labs were drawn outside of [facility], if so, notify RNCC.  If not, ask Jose Alfredo to complete labs as soon as possible with 12h trough level.     OUTCOME:  RNCC spoke with Jose Alfredo - he will come to [facility] for labs on 11/27 at 6:30a.  Discussed his need to continue labs monthly / bimonthly and make these scheduled visits.

## 2023-11-24 DIAGNOSIS — Z94.0 KIDNEY REPLACED BY TRANSPLANT: Primary | ICD-10-CM

## 2023-11-24 DIAGNOSIS — Z94.83 PANCREAS REPLACED BY TRANSPLANT (H): ICD-10-CM

## 2023-11-24 RX ORDER — SODIUM BICARBONATE 650 MG/1
1950 TABLET ORAL 2 TIMES DAILY
Qty: 180 TABLET | Refills: 0 | Status: SHIPPED | OUTPATIENT
Start: 2023-11-24 | End: 2023-12-27

## 2023-11-27 ENCOUNTER — TELEPHONE (OUTPATIENT)
Dept: TRANSPLANT | Facility: CLINIC | Age: 63
End: 2023-11-27

## 2023-11-27 ENCOUNTER — LAB (OUTPATIENT)
Dept: LAB | Facility: CLINIC | Age: 63
End: 2023-11-27
Payer: MEDICARE

## 2023-11-27 DIAGNOSIS — Z98.890 OTHER SPECIFIED POSTPROCEDURAL STATES: Primary | ICD-10-CM

## 2023-11-27 DIAGNOSIS — R80.9 PROTEINURIA: ICD-10-CM

## 2023-11-27 DIAGNOSIS — Z20.828 CONTACT WITH AND (SUSPECTED) EXPOSURE TO OTHER VIRAL COMMUNICABLE DISEASES: ICD-10-CM

## 2023-11-27 DIAGNOSIS — Z94.83 PANCREAS REPLACED BY TRANSPLANT (H): ICD-10-CM

## 2023-11-27 DIAGNOSIS — Z94.0 KIDNEY REPLACED BY TRANSPLANT: ICD-10-CM

## 2023-11-27 DIAGNOSIS — Z98.890 OTHER SPECIFIED POSTPROCEDURAL STATES: ICD-10-CM

## 2023-11-27 DIAGNOSIS — Z94.0 KIDNEY REPLACED BY TRANSPLANT: Primary | ICD-10-CM

## 2023-11-27 DIAGNOSIS — E55.9 VITAMIN D DEFICIENCY: ICD-10-CM

## 2023-11-27 DIAGNOSIS — Z12.5 SCREENING FOR PROSTATE CANCER: ICD-10-CM

## 2023-11-27 DIAGNOSIS — I10 HYPERTENSION: ICD-10-CM

## 2023-11-27 LAB
ALBUMIN MFR UR ELPH: 116 MG/DL
AMYLASE SERPL-CCNC: 78 U/L (ref 28–100)
ANION GAP SERPL CALCULATED.3IONS-SCNC: 10 MMOL/L (ref 7–15)
BUN SERPL-MCNC: 28 MG/DL (ref 8–23)
CALCIUM SERPL-MCNC: 8.8 MG/DL (ref 8.8–10.2)
CHLORIDE SERPL-SCNC: 113 MMOL/L (ref 98–107)
CREAT SERPL-MCNC: 0.97 MG/DL (ref 0.67–1.17)
CREAT UR-MCNC: 45.1 MG/DL
DEPRECATED HCO3 PLAS-SCNC: 17 MMOL/L (ref 22–29)
EGFRCR SERPLBLD CKD-EPI 2021: 89 ML/MIN/1.73M2
ERYTHROCYTE [DISTWIDTH] IN BLOOD BY AUTOMATED COUNT: 14 % (ref 10–15)
GLUCOSE SERPL-MCNC: 103 MG/DL (ref 70–99)
HCT VFR BLD AUTO: 39.3 % (ref 40–53)
HGB BLD-MCNC: 12.7 G/DL (ref 13.3–17.7)
HOLD SPECIMEN: NORMAL
LIPASE SERPL-CCNC: 42 U/L (ref 13–60)
MCH RBC QN AUTO: 28.5 PG (ref 26.5–33)
MCHC RBC AUTO-ENTMCNC: 32.3 G/DL (ref 31.5–36.5)
MCV RBC AUTO: 88 FL (ref 78–100)
PLATELET # BLD AUTO: 179 10E3/UL (ref 150–450)
POTASSIUM SERPL-SCNC: 4 MMOL/L (ref 3.4–5.3)
PROT/CREAT 24H UR: 2.57 MG/MG CR (ref 0–0.2)
PSA SERPL DL<=0.01 NG/ML-MCNC: 0.65 NG/ML (ref 0–4.5)
RBC # BLD AUTO: 4.45 10E6/UL (ref 4.4–5.9)
SODIUM SERPL-SCNC: 140 MMOL/L (ref 135–145)
TACROLIMUS BLD-MCNC: 5.3 UG/L (ref 5–15)
TME LAST DOSE: NORMAL H
TME LAST DOSE: NORMAL H
VIT D+METAB SERPL-MCNC: 15 NG/ML (ref 20–50)
WBC # BLD AUTO: 6.6 10E3/UL (ref 4–11)

## 2023-11-27 PROCEDURE — 99000 SPECIMEN HANDLING OFFICE-LAB: CPT | Performed by: PATHOLOGY

## 2023-11-27 PROCEDURE — 80048 BASIC METABOLIC PNL TOTAL CA: CPT | Performed by: PATHOLOGY

## 2023-11-27 PROCEDURE — 80197 ASSAY OF TACROLIMUS: CPT | Performed by: INTERNAL MEDICINE

## 2023-11-27 PROCEDURE — 83690 ASSAY OF LIPASE: CPT | Performed by: PATHOLOGY

## 2023-11-27 PROCEDURE — 82306 VITAMIN D 25 HYDROXY: CPT | Performed by: INTERNAL MEDICINE

## 2023-11-27 PROCEDURE — 82150 ASSAY OF AMYLASE: CPT | Performed by: PATHOLOGY

## 2023-11-27 PROCEDURE — 87799 DETECT AGENT NOS DNA QUANT: CPT | Performed by: INTERNAL MEDICINE

## 2023-11-27 PROCEDURE — G0103 PSA SCREENING: HCPCS | Performed by: PATHOLOGY

## 2023-11-27 PROCEDURE — 84156 ASSAY OF PROTEIN URINE: CPT | Performed by: PATHOLOGY

## 2023-11-27 PROCEDURE — 36415 COLL VENOUS BLD VENIPUNCTURE: CPT | Performed by: PATHOLOGY

## 2023-11-27 PROCEDURE — 85027 COMPLETE CBC AUTOMATED: CPT | Performed by: PATHOLOGY

## 2023-11-27 RX ORDER — LISINOPRIL 20 MG/1
20 TABLET ORAL DAILY
Qty: 30 TABLET | Refills: 11 | Status: SHIPPED | OUTPATIENT
Start: 2023-11-27 | End: 2024-01-03

## 2023-11-27 NOTE — TELEPHONE ENCOUNTER
ISSUE  Bicarb low at 17, should be taking sodium bicarbonate 1,950 mg BID.  UPC up to 2.57    Creatinine stable at 0.97  Repeat amylase/lipase consistent w/baseline (previous level elevated)    PLAN  Call Bushra:  Assess for any issues with diarrhea?  Confirm he is taking sodium bicarbonate 1,950 mg BID-if not, start now.    Assess BP's.  Any s/s of UTI?  Alek Jaquez MD Harris, Kathleen, RN  Let's start lisniopril 20mg daily please and have him check Bps.       OUTCOME  Call placed to Jose Alfredo. He denies any diarrhea, or acute illness lately. He is hydrating well. States he is taking his sodium bicarbonate as prescribed.     He has not been checking BP's at home, cuff is out of batteries. BP at PCP appt in July was 199/125-nothing was started at that time.   He is agreeable to starting Lisinopril 20 mg daily. I requested he get batteries for his cuff and start checking BP's 2-3 times weekly and recording them. He is to bring BP readings with to his nephrology appointment on 12/14.

## 2023-11-27 NOTE — TELEPHONE ENCOUNTER
Patient Call: General  Route to LPN    Reason for call: Theresa from [facility] lab calling, patient was in for lab draw today, orders have , needing new standing orders put into epic, they did draw labs just needing orders to process them.    Call back needed? Yes    Return Call Needed  Same as documented in contacts section  When to return call?: Same day: Route High Priority

## 2023-11-28 LAB — BKV DNA # SPEC NAA+PROBE: NOT DETECTED COPIES/ML

## 2023-12-05 ENCOUNTER — DOCUMENTATION ONLY (OUTPATIENT)
Dept: INTERNAL MEDICINE | Facility: CLINIC | Age: 63
End: 2023-12-05
Payer: MEDICARE

## 2023-12-05 NOTE — PROGRESS NOTES
Type of Form Received:     Form Received (Date) 12/5/23   Form Filled out Yes 12/11/23   Placed in provider folder Yes

## 2023-12-08 ENCOUNTER — MEDICAL CORRESPONDENCE (OUTPATIENT)
Dept: HEALTH INFORMATION MANAGEMENT | Facility: CLINIC | Age: 63
End: 2023-12-08
Payer: MEDICARE

## 2023-12-13 ENCOUNTER — PATIENT OUTREACH (OUTPATIENT)
Dept: GASTROENTEROLOGY | Facility: CLINIC | Age: 63
End: 2023-12-13
Payer: MEDICARE

## 2023-12-13 DIAGNOSIS — Z12.11 SPECIAL SCREENING FOR MALIGNANT NEOPLASMS, COLON: Primary | ICD-10-CM

## 2023-12-13 NOTE — PROGRESS NOTES
"[facility] Screening Colonoscopy Referral Review    Patient meets the inclusion criteria for screening colonoscopy standing order.    Ordering/Referring Provider:  Dr. Rosio Salas    BMI: Estimated body mass index is 24.34 kg/m  as calculated from the following:    Height as of 7/5/23: 1.829 m (6' 0.01\").    Weight as of 7/5/23: 81.4 kg (179 lb 8 oz).     Sedation:  Does patient have any of the following conditions affecting sedation?  No medical conditions affecting sedation.    Previous Scopes:  Any previous recommendations or follow up needs based on previous scope?  na / No recommendations.    Medical Concerns to Postpone Order:  Does patient have any of the following medical concerns that should postpone/delay colonoscopy referral?  No medical conditions affecting colonoscopy referral.    Final Referral Details:  Based on patient's medical history patient is appropriate for referral order with moderate sedation. If patient's BMI > 50 do not schedule in [facility].  "

## 2023-12-14 ENCOUNTER — TELEPHONE (OUTPATIENT)
Dept: TRANSPLANT | Facility: CLINIC | Age: 63
End: 2023-12-14

## 2023-12-14 NOTE — TELEPHONE ENCOUNTER
Patient called stated his cab went to the wrong side and then left so he was not able to make his appointment and would like scheduling to call back to set up another appointment.

## 2023-12-22 DIAGNOSIS — Z94.0 KIDNEY REPLACED BY TRANSPLANT: ICD-10-CM

## 2023-12-22 DIAGNOSIS — Z94.83 PANCREAS REPLACED BY TRANSPLANT (H): ICD-10-CM

## 2023-12-22 RX ORDER — TACROLIMUS 0.5 MG/1
CAPSULE, GELATIN COATED ORAL
Qty: 60 CAPSULE | Refills: 11 | Status: SHIPPED | OUTPATIENT
Start: 2023-12-22 | End: 2024-05-06

## 2023-12-27 DIAGNOSIS — Z94.0 KIDNEY REPLACED BY TRANSPLANT: Primary | ICD-10-CM

## 2023-12-27 DIAGNOSIS — Z94.83 PANCREAS REPLACED BY TRANSPLANT (H): ICD-10-CM

## 2023-12-27 RX ORDER — SODIUM BICARBONATE 650 MG/1
1950 TABLET ORAL 2 TIMES DAILY
Qty: 180 TABLET | Refills: 1 | Status: SHIPPED | OUTPATIENT
Start: 2023-12-27 | End: 2024-01-03

## 2024-01-02 ENCOUNTER — OFFICE VISIT (OUTPATIENT)
Dept: TRANSPLANT | Facility: CLINIC | Age: 64
End: 2024-01-02
Attending: INTERNAL MEDICINE
Payer: MEDICARE

## 2024-01-02 VITALS
SYSTOLIC BLOOD PRESSURE: 178 MMHG | WEIGHT: 184.3 LBS | DIASTOLIC BLOOD PRESSURE: 84 MMHG | OXYGEN SATURATION: 96 % | HEART RATE: 79 BPM | BODY MASS INDEX: 24.99 KG/M2

## 2024-01-02 DIAGNOSIS — D84.9 IMMUNOSUPPRESSION (H): ICD-10-CM

## 2024-01-02 DIAGNOSIS — R73.9 HYPERGLYCEMIA: ICD-10-CM

## 2024-01-02 DIAGNOSIS — T86.19 RENAL CYST OF KIDNEY TRANSPLANT: ICD-10-CM

## 2024-01-02 DIAGNOSIS — Z94.0 KIDNEY REPLACED BY TRANSPLANT: Primary | ICD-10-CM

## 2024-01-02 DIAGNOSIS — Z94.83 PANCREAS REPLACED BY TRANSPLANT (H): ICD-10-CM

## 2024-01-02 DIAGNOSIS — R80.9 PROTEINURIA, UNSPECIFIED TYPE: ICD-10-CM

## 2024-01-02 DIAGNOSIS — I15.0 RENOVASCULAR HYPERTENSION: ICD-10-CM

## 2024-01-02 DIAGNOSIS — N28.1 RENAL CYST OF KIDNEY TRANSPLANT: ICD-10-CM

## 2024-01-02 PROCEDURE — 99214 OFFICE O/P EST MOD 30 MIN: CPT | Performed by: NURSE PRACTITIONER

## 2024-01-02 PROCEDURE — G0463 HOSPITAL OUTPT CLINIC VISIT: HCPCS | Performed by: NURSE PRACTITIONER

## 2024-01-02 NOTE — LETTER
1/2/2024         RE: Jose Alfredo Tomlinson  [address] [place] [address] [address]  [place] [address]        Dear Colleague,    Thank you for referring your patient, Jose Alfredo Tomlinson, to the [facility]. Please see a copy of my visit note below.        TRANSPLANT NEPHROLOGY CHRONIC POST TRANSPLANT VISIT    Recommendations:  - Check hemoglobin A1c  - Check  DSA, SPEP w/ ifx, UPEP w/ ifx, K/L, anti PLA2R, C3, C4, ROWDY, U/A. If all unremarkable, get kidney biopsy   - Increase lisinopril to 40 mg daily. If BP still not controlled, then can add SGLT2i.    - Increase bicarb 1950 mg from  BID  to TID  - Renal US   - Derm check     Assessment & Plan  # DDKT: Stable   - Baseline Creatinine:  ~ 0.9-1.1   - Proteinuria: Moderate (1-3 grams), UPCR 2.57g/g 11/27/23   - Date DSA Last Checked: Dec/2022      Latest DSA: No, will repeat    - BK Viremia: No   - Kidney Tx Biopsy: No    Will check DSA, SPEP w/ ifx, UPEP w/ ifx, K/L, anti PLA2R, C3, C4, ROWDY, U/A. If no etiology of increased proteinuria found, get kidney biopsy        # Pancreas Tx (SABINE):    - Pancreatic Exocrine Drainage: Enteric drained     - Blood glucose:  Euglycemia      On insulin: No   - HbA1c: Trend up      Latest HbA1c: 5.3%, will repeat    - Pancreatic enzymes: Trend down   - Date DSA Last Checked: Dec/2022  Latest DSA: No   - Pancreas Tx Biopsy: No     # Proteinuria:   -Increasing from 1.39g/g in 12/2022 to 2.57g/g in 11/2023   - Increase Lisinopril to 40mg daily.    -Obtain SPEP w/ ifx, UPEP w/ ifx, K/L, anti PLA2R, C3, C4, ROWDY, U/A    -Proteinuria present since at least 12/2018 and increasing    - If BP still not controlled with increasing Lisinopril, can add SGLT2i               - if no etiology found, will get kidney biopsy as above.     # Immunosuppression: Tacrolimus immediate release (goal 5-8) and Mycophenolate mofetil (dose 500 mg every 12 hours)   - Continue with intensive monitoring of immunosuppression for efficacy and  toxicity.   - Changes: Not at this time    # Infection Prophylaxis:   - PJP: None. CD4 420 in 2/2021    # Hypertension: Inadequate control;  Goal BP: < 130/80   - Changes: Yes, increase lisinopril to 40 mg daily. If still not controlled, then can add SGLT2i.      BPs: not checking at home, but elevated on clinic checks ~ 170s-190s/80s-100s. Reports he recently ran out of BPs meds for a few weeks.     # Anemia in Chronic Renal Disease: Hgb: Stable      SJ: No   - Iron studies: Not checked recently    # Mineral Bone Disorder:   - Secondary renal hyperparathyroidism; PTH level: Normal (18-80 pg/ml)        On treatment: None  - Vitamin D; level: Low        On supplement: Yes  - Calcium; level: Normal        On supplement: No  - Phosphorus; level: Normal        On supplement: No    # Electrolytes:   - Potassium; level: Normal        On supplement: No  - Magnesium; level: Normal        On supplement: No  - Bicarbonate; level: Low        On supplement: Yes, bicarb 1950mg BID, will increase to TID    # Skin Cancer: New lesions: none   - Discussed sun protection and recommend regular follow up with Dermatology.   -s/p Mohs 11/2022 for BCC on L nose    # Medical Compliance: No.  Evidence of labs less than recommended and infrequent transplant follow-up.  - Discussed importance of checking labs regularly as recommended, taking medications as prescribed and attending scheduled medical appointments.    # Kidney transplant lesion:   -Increased size on 8/1/22 U/S. Repeat U/S now     # Health Maintenance and Vaccination Review: Recommend:  COVID, flu, TDap, Shingrix, RSV      # Transplant History:  Etiology of Kidney Failure: Diabetes mellitus type 1  Tx: DDKT and SABINE  Transplant: 10/29/2007 (Pancreas), 3/28/2007 (Kidney)  Significant changes in immunosuppression: None  Significant transplant-related complications: None    Transplant Office Phone Number: [phone]    Assessment and plan was discussed with the patient and he  voiced his understanding and agreement.    Return visit: No follow-ups on file.    RENETTA Parson CNP    Chief Complaint  Mr. Tomlinson is a 63 year old here for routine follow up, kidney transplant, pancreas transplant and immunosuppression management.    History of Present Illness  Since Mr. Tomlinson was last seen by Dr. Jaquez in 8/2022, he has been doing okay.  He has not had any hospitalizations or ED visits.  No recent illnesses or new medical complaints.  He reports his energy is good.  Appetite is fine.  Does have occasional nausea/diarrhea but no vomiting.  No fevers, sweats, chills.  Recently ran out of his lisinopril prescription for a few weeks. Bood pressures have been elevated on last few clinic checks averaging ~ 170s-190s/ 80s-100s. Reports he has been taking his immunosuppression as prescribed.  No new concerning skin lesions, he is overdue for dermatology check.  Proteinuria noted back to 2018 and increasing noun to 2.5 g/g as of 11/2023.       Home BP: Doesn't know    Problem List  Patient Active Problem List   Diagnosis     Hyperlipidemia     Peripheral neuropathy     PAD (peripheral artery disease) (H24)     Seborrheic keratosis     Acquired perforating dermatosis     Atrial bigeminy     Kidney replaced by transplant     Pancreas replaced by transplant (H)     HTN, kidney transplant related     Diabetic neuropathy, type I diabetes mellitus (H)     Tactile anesthesia     Personal history of diabetic foot ulcer     Right foot drop     Wrist drop, bilateral     Immunosuppression (H24)       Allergies  No Known Allergies    Medications  Current Outpatient Medications   Medication Sig     ASPIRIN LOW DOSE 81 MG EC tablet TAKE ONE TABLET BY MOUTH ONCE DAILY     atorvastatin (LIPITOR) 10 MG tablet TAKE ONE TABLET BY MOUTH ONCE DAILY     CELLCEPT (BRAND) 500 MG tablet Take 1 tablet (500 mg) by mouth 2 times daily     lisinopril (ZESTRIL) 20 MG tablet Take 1 tablet (20 mg) by mouth daily      omeprazole (PRILOSEC) 20 MG DR capsule Take 1 capsule (20 mg) by mouth 2 times daily     order for DME Equipment being ordered: therapeutic shoes and insoles. For Peripheral neuropathy, diabetes type 1 and poor circulation     PROGRAF (BRAND) 0.5 MG capsule TAKE ONE CAPSULE BY MOUTH TWICE A DAYS Strength: 0.5 mg     PROGRAF (BRAND) 1 MG capsule TAKE ONE CAPSULE BY MOUTH TWICE A DAY.     sodium bicarbonate 650 MG tablet Take 3 tablets (1,950 mg) by mouth 2 times daily     vitamin C (ASCORBIC ACID) 500 MG tablet Take 2 tablets (1,000 mg) by mouth daily     vitamin C (ASCORBIC ACID) 500 MG tablet TAKE TWO TABLETS BY MOUTH ONCE DAILY     Vitamin D3 (CHOLECALCIFEROL) 25 mcg (1000 units) tablet Take 1 tablet (25 mcg) by mouth daily     Current Facility-Administered Medications   Medication     lidocaine 1% with EPINEPHrine 1:100,000 injection 3 mL     There are no discontinued medications.    Physical Exam    There were no vitals taken for this visit.   GENERAL APPEARANCE: alert and no distress  HENT: mouth without ulcers or lesions  LYMPHATICS: no cervical or supraclavicular nodes  RESP: lungs clear to auscultation - no rales, rhonchi or wheezes  CV: regular rhythm, normal rate, no rub, no murmur  EDEMA: no LE edema bilaterally  ABDOMEN: soft, nondistended, nontender, bowel sounds normal  MS: extremities normal - no gross deformities noted, no evidence of inflammation in joints, no muscle tenderness  SKIN: no rash        Data        Latest Ref Rng & Units 11/27/2023     6:36 AM 4/12/2023     6:34 AM 3/30/2023     6:29 AM   Renal   Sodium 135 - 145 mmol/L 140  141  144    K 3.4 - 5.3 mmol/L 4.0  4.0  4.4    Cl 98 - 107 mmol/L 113  113  115    Cl (external) 98 - 107 mmol/L 113  113  115    CO2 22 - 29 mmol/L 17  17  18    Urea Nitrogen 8.0 - 23.0 mg/dL 28.0  32.4  21.9    Creatinine 0.67 - 1.17 mg/dL 0.97  1.06  0.95    Glucose 70 - 99 mg/dL 103  105  114    Calcium 8.8 - 10.2 mg/dL 8.8  9.2  9.1          Latest Ref Rng &  Units 11/27/2023     6:36 AM 12/22/2020     8:04 AM 11/14/2010     6:29 AM   Bone Health   Phosphorus 2.5 - 4.5 mg/dL   4.1    Vit D Def 20 - 50 ng/mL 15  18           Latest Ref Rng & Units 11/27/2023     6:36 AM 4/12/2023     6:34 AM 3/30/2023     6:29 AM   Heme   WBC 4.0 - 11.0 10e3/uL 6.6  6.9  6.8    Hgb 13.3 - 17.7 g/dL 12.7  12.5  12.9    Plt 150 - 450 10e3/uL 179  153  176          Latest Ref Rng & Units 10/3/2021     8:43 AM 9/20/2021     8:10 AM 12/22/2020     8:04 AM   Liver   AP 40 - 150 U/L 93  82  88    TBili 0.2 - 1.3 mg/dL 0.4   0.3    ALT 0 - 70 U/L 16  22  17    AST 0 - 45 U/L 12  30  10    Tot Protein 6.8 - 8.8 g/dL 6.8  6.7  6.2    Albumin 3.4 - 5.0 g/dL 3.5  3.6  3.6          Latest Ref Rng & Units 11/27/2023     6:36 AM 4/12/2023     6:34 AM 3/30/2023     6:29 AM   Pancreas   Amylase 28 - 100 U/L 78  167  189    Lipase (Roche) 13 - 60 U/L 42  169  190          Latest Ref Rng & Units 1/25/2016    10:24 AM 11/16/2007     5:58 AM 3/29/2007    10:15 PM   Iron studies   Iron 35 - 180 ug/dL 85  38  22    Iron Saturation Index 15 - 46 % 27      Ferritin 26 - 388 ng/mL 53  368  726          Latest Ref Rng & Units 12/22/2020     8:05 AM 3/3/2015    11:07 AM 11/12/2010     9:26 PM   [facility]   CVM DNA Quant    Whole blood, EDTA anticoagulant    CMV Quant <100 Copies/mL   <100    CMV QT Log <2.0 Log copies/mL   <2.0    BK Spec  Plasma  Plasma     BK Res BKNEG^BK Virus DNA Not Detected copies/mL BK Virus DNA Not Detected  BK Virus DNA Not Detected     BK Log <2.7 Log copies/mL Not Calculated  Not Calculated   The Real-Time quantitative BK Virus assay was developed and its performance   characteristics determined by the [facility] Diagnostic Laboratory at   the [facility] in [place], [place]. The   primers and probes for each analyte are Analyte Specific Reagents (ASRs)   manufactured by [employer].   ASRs are used in many laboratory tests necessary for  standard medical care and   generally do not require U.S. Food and Drug Administration approval. The FDA   has determined that such clearance or approval is not necessary.   This test is used for clinical purposes. It should not be regarded as   investigational or for research. This laboratory is certified under the   Clinical Laboratory Improvement Amendments of 1988 (CLIA-88) as qualified to   perform high complexity clinical laboratory testing.           Recent Labs   Lab Test 03/06/21  0858 03/30/23  0629 04/12/23  0634 11/27/23  0636   DOSTAC 700pm 3/5/21 3/29/2023 4/11/2023  --    TACROL 4.9* 5.0 7.4 5.3             Again, thank you for allowing me to participate in the care of your patient.        Sincerely,        RENETTA Parson CNP

## 2024-01-03 RX ORDER — SODIUM BICARBONATE 650 MG/1
1950 TABLET ORAL 3 TIMES DAILY
Qty: 810 TABLET | Refills: 3 | Status: SHIPPED | OUTPATIENT
Start: 2024-01-03 | End: 2025-01-02

## 2024-01-03 RX ORDER — LISINOPRIL 20 MG/1
40 TABLET ORAL DAILY
Qty: 180 TABLET | Refills: 3 | Status: SHIPPED | OUTPATIENT
Start: 2024-01-03 | End: 2025-01-02

## 2024-01-17 ENCOUNTER — ANCILLARY PROCEDURE (OUTPATIENT)
Dept: ULTRASOUND IMAGING | Facility: CLINIC | Age: 64
End: 2024-01-17
Attending: NURSE PRACTITIONER
Payer: MEDICARE

## 2024-01-17 DIAGNOSIS — T86.19 RENAL CYST OF KIDNEY TRANSPLANT: ICD-10-CM

## 2024-01-17 DIAGNOSIS — N28.1 RENAL CYST OF KIDNEY TRANSPLANT: ICD-10-CM

## 2024-01-17 DIAGNOSIS — D84.9 IMMUNOSUPPRESSION (H): ICD-10-CM

## 2024-01-17 DIAGNOSIS — Z94.0 KIDNEY REPLACED BY TRANSPLANT: ICD-10-CM

## 2024-01-17 PROCEDURE — 76775 US EXAM ABDO BACK WALL LIM: CPT | Performed by: RADIOLOGY

## 2024-01-27 ENCOUNTER — HEALTH MAINTENANCE LETTER (OUTPATIENT)
Age: 64
End: 2024-01-27

## 2024-02-07 ENCOUNTER — TELEPHONE (OUTPATIENT)
Dept: TRANSPLANT | Facility: CLINIC | Age: 64
End: 2024-02-07
Payer: MEDICARE

## 2024-02-07 DIAGNOSIS — D84.9 IMMUNOSUPPRESSED STATUS (H): ICD-10-CM

## 2024-02-07 DIAGNOSIS — N28.1 CYST OF TRANSPLANTED KIDNEY: Primary | ICD-10-CM

## 2024-02-07 DIAGNOSIS — T86.19 CYST OF TRANSPLANTED KIDNEY: Primary | ICD-10-CM

## 2024-02-07 NOTE — TELEPHONE ENCOUNTER
ISSUE:  US renal tx shows:  IMPRESSION:   1.  No hydronephrosis. Normal parenchymal echogenicity.  2.  Exophytic complex cyst in the upper pole of the left lower  quadrant renal transplant is redemonstrated and has minimally  increased in one dimension from prior study. Again this most likely  represents a hemorrhagic/proteinaceous cyst however since this has  slightly increased in size in an immunosuppressed patient, would again  suggest continued ultrasound follow-up. Alternatively this could be  further evaluated with CT or MRI if concerned clinically although this  is most favored to be an indolent lesion.      PLAN  ----- Message -----   From: Bahman Harper APRN CNP   Sent: 1/18/2024   5:26 PM CST   To: Jamila Lancaster RN     Please get noncon MRI abdomen. Thank you.       OUTCOME  Orders placed. My chart message sent to patient.

## 2024-02-12 ENCOUNTER — HOSPITAL ENCOUNTER (OUTPATIENT)
Dept: RESEARCH | Facility: CLINIC | Age: 64
Discharge: HOME | End: 2024-02-12
Attending: INTERNAL MEDICINE
Payer: MEDICARE

## 2024-02-12 PROCEDURE — 510N000017 HC CRU PATIENT CARE, PER 15 MIN

## 2024-02-12 PROCEDURE — 510N000009 HC RESEARCH FACILITY, PER 15 MIN

## 2024-02-28 ENCOUNTER — TELEPHONE (OUTPATIENT)
Dept: DERMATOLOGY | Facility: CLINIC | Age: 64
End: 2024-02-28
Payer: MEDICARE

## 2024-02-28 NOTE — TELEPHONE ENCOUNTER
Left Voicemail (1st Attempt) and Sent Mychart (1st Attempt) for the patient to call back and schedule the following:    Appointment type: TSC  Provider: Khloe Aly  Return date: 7/19/24  Specialty phone number: [phone]

## 2024-03-01 ENCOUNTER — ANCILLARY PROCEDURE (OUTPATIENT)
Dept: MRI IMAGING | Facility: CLINIC | Age: 64
End: 2024-03-01
Attending: NURSE PRACTITIONER
Payer: MEDICARE

## 2024-03-01 ENCOUNTER — TELEPHONE (OUTPATIENT)
Dept: DERMATOLOGY | Facility: CLINIC | Age: 64
End: 2024-03-01

## 2024-03-01 DIAGNOSIS — T86.19 CYST OF TRANSPLANTED KIDNEY: ICD-10-CM

## 2024-03-01 DIAGNOSIS — D84.9 IMMUNOSUPPRESSED STATUS (H): ICD-10-CM

## 2024-03-01 DIAGNOSIS — N28.1 CYST OF TRANSPLANTED KIDNEY: ICD-10-CM

## 2024-03-01 PROCEDURE — G1010 CDSM STANSON: HCPCS | Performed by: RADIOLOGY

## 2024-03-01 PROCEDURE — 74181 MRI ABDOMEN W/O CONTRAST: CPT | Mod: MG | Performed by: RADIOLOGY

## 2024-03-01 NOTE — TELEPHONE ENCOUNTER
Patient Contacted  and schedule the following:    Appointment type: TSC  Provider: [facility]  Return date: 4/22/24  Specialty phone number: [phone]

## 2024-03-04 ENCOUNTER — HOSPITAL ENCOUNTER (OUTPATIENT)
Dept: RESEARCH | Facility: CLINIC | Age: 64
Discharge: HOME | End: 2024-03-04
Attending: INTERNAL MEDICINE | Admitting: INTERNAL MEDICINE
Payer: MEDICARE

## 2024-03-04 PROCEDURE — 510N000009 HC RESEARCH FACILITY, PER 15 MIN

## 2024-03-04 PROCEDURE — 510N000017 HC CRU PATIENT CARE, PER 15 MIN

## 2024-03-04 PROCEDURE — 999N000129 HC STATISTIC PICC/MID LINE PROC CANCELLED SUBQ WORKUP

## 2024-04-02 ENCOUNTER — TELEPHONE (OUTPATIENT)
Dept: TRANSPLANT | Facility: CLINIC | Age: 64
End: 2024-04-02
Payer: MEDICARE

## 2024-04-02 DIAGNOSIS — R93.429 ABNORMAL ULTRASOUND OF KIDNEY: ICD-10-CM

## 2024-04-02 DIAGNOSIS — Z94.0 KIDNEY REPLACED BY TRANSPLANT: ICD-10-CM

## 2024-04-02 DIAGNOSIS — N28.1 RENAL CYST OF KIDNEY TRANSPLANT: Primary | ICD-10-CM

## 2024-04-02 DIAGNOSIS — T86.19 CYST OF TRANSPLANTED KIDNEY: ICD-10-CM

## 2024-04-02 DIAGNOSIS — Z48.298 AFTERCARE FOLLOWING ORGAN TRANSPLANT: ICD-10-CM

## 2024-04-02 DIAGNOSIS — T86.19 RENAL CYST OF KIDNEY TRANSPLANT: Primary | ICD-10-CM

## 2024-04-02 DIAGNOSIS — N28.1 CYST OF TRANSPLANTED KIDNEY: ICD-10-CM

## 2024-04-02 NOTE — TELEPHONE ENCOUNTER
"Issue:   MR Abdomen 3/1/24 showed increased kidney cyst compared to Ultrasound 1/17/24    Plan:   Per CARTER Haprer - \"Please have him see urology to assess/follow\"     Outcome:   Placed Urology consult. Called patient to inform him.   And gave their clinic number [phone], he agreed to plan of care  "

## 2024-04-15 ENCOUNTER — TELEPHONE (OUTPATIENT)
Dept: UROLOGY | Facility: CLINIC | Age: 64
End: 2024-04-15
Payer: MEDICARE

## 2024-04-15 NOTE — TELEPHONE ENCOUNTER
[facility] Call Center    Phone Message    May a detailed message be left on voicemail: no     Reason for Call: Appointment Intake    Referring Provider Name: Bahman Harper APRN CNP  Diagnosis and/or Symptoms: Cyst of transplanted kidney [T86.19, N28.1]  Kidney replaced by transplant [Z94.0]  Renal cyst of kidney transplant [T86.19, N28.1]  Aftercare following organ transplant [Z48.298]  Abnormal ultrasound of kidney [R93.429]     Check referral, and call patient to schedule with appropriate provider.    Action Taken: Message routed to:  [facility] ([facility] Urology    Travel Screening: Not Applicable

## 2024-04-17 DIAGNOSIS — Z94.83 PANCREAS REPLACED BY TRANSPLANT (H): ICD-10-CM

## 2024-04-17 DIAGNOSIS — Z94.0 KIDNEY REPLACED BY TRANSPLANT: ICD-10-CM

## 2024-04-17 RX ORDER — TACROLIMUS 1 MG/1
CAPSULE, GELATIN COATED ORAL
Qty: 60 CAPSULE | Refills: 3 | Status: SHIPPED | OUTPATIENT
Start: 2024-04-17 | End: 2024-05-06

## 2024-04-18 NOTE — TELEPHONE ENCOUNTER
Patient confirmed scheduled appointment:  Date: May 22nd   Time: 4:45pm   Visit type: New Urology   Provider: Dr. Quintana   [place] [facility] [place]/imaging: N/A  Additional notes: New referral

## 2024-04-19 ENCOUNTER — PRE VISIT (OUTPATIENT)
Dept: UROLOGY | Facility: CLINIC | Age: 64
End: 2024-04-19
Payer: MEDICARE

## 2024-04-19 NOTE — TELEPHONE ENCOUNTER
Reason for visit: consult     Relevant information: cyst of transplanted kidney    Records/imaging/labs/orders: MRI available from 3/1/24    Pt called: No need for a call    At Rooming: maosn Hilton  4/19/2024  12:06 PM

## 2024-04-22 ENCOUNTER — OFFICE VISIT (OUTPATIENT)
Dept: DERMATOLOGY | Facility: CLINIC | Age: 64
End: 2024-04-22
Attending: NURSE PRACTITIONER
Payer: MEDICARE

## 2024-04-22 DIAGNOSIS — D84.9 IMMUNOSUPPRESSION (H): ICD-10-CM

## 2024-04-22 DIAGNOSIS — L81.4 LENTIGINES: ICD-10-CM

## 2024-04-22 DIAGNOSIS — L82.1 SEBORRHEIC KERATOSES: ICD-10-CM

## 2024-04-22 DIAGNOSIS — D22.9 MULTIPLE NEVI: ICD-10-CM

## 2024-04-22 DIAGNOSIS — Z12.83 ENCOUNTER FOR SCREENING FOR MALIGNANT NEOPLASM OF SKIN: Primary | ICD-10-CM

## 2024-04-22 DIAGNOSIS — Z94.0 KIDNEY REPLACED BY TRANSPLANT: ICD-10-CM

## 2024-04-22 DIAGNOSIS — Z94.83 PANCREAS REPLACED BY TRANSPLANT (H): ICD-10-CM

## 2024-04-22 DIAGNOSIS — D18.01 CHERRY ANGIOMA: ICD-10-CM

## 2024-04-22 PROCEDURE — 99213 OFFICE O/P EST LOW 20 MIN: CPT | Performed by: PHYSICIAN ASSISTANT

## 2024-04-22 ASSESSMENT — PAIN SCALES - GENERAL: PAINLEVEL: NO PAIN (0)

## 2024-04-22 NOTE — LETTER
4/22/2024       RE: Jose Alfredo Tomlinson  [address] [place] [address] [address]  [place] [address]     Dear Colleague,    Thank you for referring your patient, Jose Alfredo Tomlinson, to the [facility]. Please see a copy of my visit note below.    [facility] Dermatology Note  Encounter Date: Apr 22, 2024  Office Visit     Reviewed patients past medical history and pertinent chart review prior to patients visit today.     Dermatology Problem List:  # Kidney and pancreas transplant, 2007, chronic immunosuppression  # BCC, L nose, s/p Mohs 11/15/2022  ____________________________________________    Assessment & Plan:     # Chronic immunosuppression  # Personal history of nonmelanoma skin cancer  - No signs of recurrence. Continued observation recommended.   - Sun protection: Counseled SPF 30+ sunscreen, UPF clothing, sun avoidance, tanning bed avoidance.    # Multiple nevi, trunk and extremities  # Solar lentigines  - Nevi demonstrate no concerning features on dermoscopy. We discussed the importance of self exams at home.   - ABCDEs: Counseled ABCDEs of melanoma: Asymmetry, Border (irregularity), Color (not uniform, changes in color), Diameter (greater than 6 mm which is about the size of a pencil eraser), and Evolving (any changes in preexisting moles).    # Cherry angiomas  # Seborrheic keratoses  - We discussed the benign nature of the skin lesions. No treatment required. Continued observation recommended. Follow up with any concerns.      Follow-up:  Annual for follow up full body skin exam, as needed for new or changing lesions or new concerns    All risks, benefits and alternatives were discussed with patient.  Patient is in agreement and understands the assessment and plan.  All questions were answered.  Pauline Dey PA-C  [facility]  Dermatology    ____________________________________________    CC: Skin Check (Jose Alfredo is here today for a skin check and does not have any areas of concerns)    HPI:  Mr. Jose Alfredo Tomlinson is a(n) 63 year old male who presents today as a return patient for a full body skin cancer screening. The patient has a history of nonmelanoma skin cancer. The patient has a history of a kidney and pancreas transplant, chronically immunosuppressed with prograf and cellcept. No specific cutaneous concerns. The patient reports trying to be diligent with photoprotection.      Physical Exam:  Vitals: There were no vitals taken for this visit.   SKIN: Total skin excluding the genitalia areas was performed. The exam included the scalp, face, neck, bilateral arms, chest, back, abdomen, bilateral legs, digits, mons pubis, buttocks, and nails.   - Balderas I-II.  - Multiple tan/brown macules and papules scattered throughout exam, consistent with benign nevi. No concerning features on dermoscopy.   - Scattered tan, homogenous macules scattered on sun exposed skin, consistent with solar lentigines.   - Scattered waxy, stuck on appearing papules and patches, consistent with seborrheic keratoses.  - Several 1-2 mm red dome shaped symmetric papules, consistent with cherry angiomas.     - No other lesions of concern on areas examined.     Medications:  Current Outpatient Medications   Medication Sig Dispense Refill    ASPIRIN LOW DOSE 81 MG EC tablet TAKE ONE TABLET BY MOUTH ONCE DAILY 90 tablet 3    atorvastatin (LIPITOR) 10 MG tablet TAKE ONE TABLET BY MOUTH ONCE DAILY 90 tablet 3    CELLCEPT (BRAND) 500 MG tablet Take 1 tablet (500 mg) by mouth 2 times daily 60 tablet 11    lisinopril (ZESTRIL) 20 MG tablet Take 2 tablets (40 mg) by mouth daily 180 tablet 3    omeprazole (PRILOSEC) 20 MG DR capsule Take 1 capsule (20 mg) by mouth 2 times daily 180 capsule 3    order for DME Equipment being ordered: therapeutic shoes and insoles. For  Peripheral neuropathy, diabetes type 1 and poor circulation 1 Package 1    PROGRAF (BRAND) 0.5 MG capsule TAKE ONE CAPSULE BY MOUTH TWICE A DAYS Strength: 0.5 mg 60 capsule 11    PROGRAF (BRAND) 1 MG capsule TAKE ONE CAPSULE BY MOUTH TWICE A DAY. 60 capsule 3    sodium bicarbonate 650 MG tablet Take 3 tablets (1,950 mg) by mouth 3 times daily 810 tablet 3    vitamin C (ASCORBIC ACID) 500 MG tablet Take 2 tablets (1,000 mg) by mouth daily 30 tablet 1    vitamin C (ASCORBIC ACID) 500 MG tablet TAKE TWO TABLETS BY MOUTH ONCE DAILY 180 tablet 2    Vitamin D3 (CHOLECALCIFEROL) 25 mcg (1000 units) tablet Take 1 tablet (25 mcg) by mouth daily 30 tablet 2     Current Facility-Administered Medications   Medication Dose Route Frequency Provider Last Rate Last Admin    lidocaine 1% with EPINEPHrine 1:100,000 injection 3 mL  3 mL Intradermal Once Clayton Carvalho MD          Past Medical History:   Patient Active Problem List   Diagnosis    Hyperlipidemia    Peripheral neuropathy    PAD (peripheral artery disease) (H24)    Seborrheic keratosis    Acquired perforating dermatosis    Atrial bigeminy    Kidney replaced by transplant    Pancreas replaced by transplant (H)    HTN, kidney transplant related    Diabetic neuropathy, type I diabetes mellitus (H)    Tactile anesthesia    Personal history of diabetic foot ulcer    Right foot drop    Wrist drop, bilateral    Immunosuppression (H24)     Past Medical History:   Diagnosis Date    Cataracts     Foot drop     Gastroparesis     GERD (gastroesophageal reflux disease)     History of diabetes mellitus, type I     Hyperlipidemia     Hypertension     Pancreas transplanted (H) 2007    hx of rejection in past     Peripheral neuropathy     S/P kidney transplant 2007       CC Bahman Harper, APRN CNP  [address] [place] [address] on close of this encounter.

## 2024-04-22 NOTE — PROGRESS NOTES
[facility] Dermatology Note  Encounter Date: Apr 22, 2024  Office Visit     Reviewed patients past medical history and pertinent chart review prior to patients visit today.     Dermatology Problem List:  # Kidney and pancreas transplant, 2007, chronic immunosuppression  # BCC, L nose, s/p Mohs 11/15/2022  ____________________________________________    Assessment & Plan:     # Chronic immunosuppression  # Personal history of nonmelanoma skin cancer  - No signs of recurrence. Continued observation recommended.   - Sun protection: Counseled SPF 30+ sunscreen, UPF clothing, sun avoidance, tanning bed avoidance.    # Multiple nevi, trunk and extremities  # Solar lentigines  - Nevi demonstrate no concerning features on dermoscopy. We discussed the importance of self exams at home.   - ABCDEs: Counseled ABCDEs of melanoma: Asymmetry, Border (irregularity), Color (not uniform, changes in color), Diameter (greater than 6 mm which is about the size of a pencil eraser), and Evolving (any changes in preexisting moles).    # Cherry angiomas  # Seborrheic keratoses  - We discussed the benign nature of the skin lesions. No treatment required. Continued observation recommended. Follow up with any concerns.      Follow-up:  Annual for follow up full body skin exam, as needed for new or changing lesions or new concerns    All risks, benefits and alternatives were discussed with patient.  Patient is in agreement and understands the assessment and plan.  All questions were answered.  Pauline Dey PA-C  [facility] Dermatology    ____________________________________________    CC: Skin Check (Jose Alfredo is here today for a skin check and does not have any areas of concerns)    HPI:  Mr. Jose Alfredo Tomlinson is a(n) 63 year old male who presents today as a return patient for a full body skin cancer screening. The patient has a history of nonmelanoma skin cancer. The patient has a history of a kidney and pancreas  transplant, chronically immunosuppressed with prograf and cellcept. No specific cutaneous concerns. The patient reports trying to be diligent with photoprotection.      Physical Exam:  Vitals: There were no vitals taken for this visit.   SKIN: Total skin excluding the genitalia areas was performed. The exam included the scalp, face, neck, bilateral arms, chest, back, abdomen, bilateral legs, digits, mons pubis, buttocks, and nails.   - Balderas I-II.  - Multiple tan/brown macules and papules scattered throughout exam, consistent with benign nevi. No concerning features on dermoscopy.   - Scattered tan, homogenous macules scattered on sun exposed skin, consistent with solar lentigines.   - Scattered waxy, stuck on appearing papules and patches, consistent with seborrheic keratoses.  - Several 1-2 mm red dome shaped symmetric papules, consistent with cherry angiomas.     - No other lesions of concern on areas examined.     Medications:  Current Outpatient Medications   Medication Sig Dispense Refill    ASPIRIN LOW DOSE 81 MG EC tablet TAKE ONE TABLET BY MOUTH ONCE DAILY 90 tablet 3    atorvastatin (LIPITOR) 10 MG tablet TAKE ONE TABLET BY MOUTH ONCE DAILY 90 tablet 3    CELLCEPT (BRAND) 500 MG tablet Take 1 tablet (500 mg) by mouth 2 times daily 60 tablet 11    lisinopril (ZESTRIL) 20 MG tablet Take 2 tablets (40 mg) by mouth daily 180 tablet 3    omeprazole (PRILOSEC) 20 MG DR capsule Take 1 capsule (20 mg) by mouth 2 times daily 180 capsule 3    order for DME Equipment being ordered: therapeutic shoes and insoles. For Peripheral neuropathy, diabetes type 1 and poor circulation 1 Package 1    PROGRAF (BRAND) 0.5 MG capsule TAKE ONE CAPSULE BY MOUTH TWICE A DAYS Strength: 0.5 mg 60 capsule 11    PROGRAF (BRAND) 1 MG capsule TAKE ONE CAPSULE BY MOUTH TWICE A DAY. 60 capsule 3    sodium bicarbonate 650 MG tablet Take 3 tablets (1,950 mg) by mouth 3 times daily 810 tablet 3    vitamin C (ASCORBIC ACID) 500 MG tablet  Take 2 tablets (1,000 mg) by mouth daily 30 tablet 1    vitamin C (ASCORBIC ACID) 500 MG tablet TAKE TWO TABLETS BY MOUTH ONCE DAILY 180 tablet 2    Vitamin D3 (CHOLECALCIFEROL) 25 mcg (1000 units) tablet Take 1 tablet (25 mcg) by mouth daily 30 tablet 2     Current Facility-Administered Medications   Medication Dose Route Frequency Provider Last Rate Last Admin    lidocaine 1% with EPINEPHrine 1:100,000 injection 3 mL  3 mL Intradermal Once Clayton Carvalho MD          Past Medical History:   Patient Active Problem List   Diagnosis    Hyperlipidemia    Peripheral neuropathy    PAD (peripheral artery disease) (H24)    Seborrheic keratosis    Acquired perforating dermatosis    Atrial bigeminy    Kidney replaced by transplant    Pancreas replaced by transplant (H)    HTN, kidney transplant related    Diabetic neuropathy, type I diabetes mellitus (H)    Tactile anesthesia    Personal history of diabetic foot ulcer    Right foot drop    Wrist drop, bilateral    Immunosuppression (H24)     Past Medical History:   Diagnosis Date    Cataracts     Foot drop     Gastroparesis     GERD (gastroesophageal reflux disease)     History of diabetes mellitus, type I     Hyperlipidemia     Hypertension     Pancreas transplanted (H) 2007    hx of rejection in past     Peripheral neuropathy     S/P kidney transplant 2007       CC Bahman Harper, APRN CNP  [address] [place] [address] on close of this encounter.

## 2024-05-06 ENCOUNTER — TELEPHONE (OUTPATIENT)
Dept: TRANSPLANT | Facility: CLINIC | Age: 64
End: 2024-05-06
Payer: MEDICARE

## 2024-05-06 DIAGNOSIS — Z94.0 KIDNEY REPLACED BY TRANSPLANT: Primary | ICD-10-CM

## 2024-05-06 DIAGNOSIS — Z48.298 AFTERCARE FOLLOWING ORGAN TRANSPLANT: ICD-10-CM

## 2024-05-06 DIAGNOSIS — Z94.83 PANCREAS REPLACED BY TRANSPLANT (H): ICD-10-CM

## 2024-05-06 RX ORDER — TACROLIMUS 1 MG/1
1 CAPSULE ORAL 2 TIMES DAILY
Qty: 60 CAPSULE | Refills: 11 | Status: SHIPPED | OUTPATIENT
Start: 2024-05-06

## 2024-05-06 RX ORDER — MYCOPHENOLATE MOFETIL 500 MG/1
500 TABLET ORAL 2 TIMES DAILY
Qty: 60 TABLET | Refills: 11 | Status: SHIPPED | OUTPATIENT
Start: 2024-05-06

## 2024-05-06 RX ORDER — TACROLIMUS 0.5 MG/1
0.5 CAPSULE ORAL 2 TIMES DAILY
Qty: 60 CAPSULE | Refills: 11 | Status: SHIPPED | OUTPATIENT
Start: 2024-05-06

## 2024-05-06 NOTE — TELEPHONE ENCOUNTER
MEDICAL RECORDS REQUEST   [facility]  Urology Clinic  [address] [place] [id number]  PHONE: [phone]  Fax: [phone]        FUTURE VISIT INFORMATION                                                   Jose Alfredo Tomlinson, : 1960 scheduled for future visit at [facility]    APPOINTMENT INFORMATION:  Date: 2024  Provider:  Christel Quintana MD  Reason for Visit/Diagnosis: MR Abdomen 3/1/24 showed kidney cyst    REFERRAL INFORMATION:  Referring provider:  Bahman Harper APRN CNP in [facility]      RECORDS REQUESTED FOR VISIT                                                     NOTES  STATUS/DETAILS   OFFICE NOTE from referring provider  yes, Bahman Harper APRN CNP in [facility]   MEDICATION LIST  yes   LABS     URINALYSIS (UA)  yes   images  yes, 2024 -- MR ABD  2024 -- US RENAL   2023 -- XR ABD       PRE-VISIT CHECKLIST      Joint diagnostic appointment coordinated correctly          (ensure right order & amount of time) Yes   RECORD COLLECTION COMPLETE YES

## 2024-05-22 ENCOUNTER — PRE VISIT (OUTPATIENT)
Dept: UROLOGY | Facility: CLINIC | Age: 64
End: 2024-05-22

## 2024-05-22 ENCOUNTER — OFFICE VISIT (OUTPATIENT)
Dept: UROLOGY | Facility: CLINIC | Age: 64
End: 2024-05-22
Payer: MEDICARE

## 2024-05-22 VITALS — HEART RATE: 70 BPM | SYSTOLIC BLOOD PRESSURE: 174 MMHG | DIASTOLIC BLOOD PRESSURE: 92 MMHG

## 2024-05-22 DIAGNOSIS — T86.19 RENAL CYST OF KIDNEY TRANSPLANT: ICD-10-CM

## 2024-05-22 DIAGNOSIS — N28.1 RENAL CYST OF KIDNEY TRANSPLANT: ICD-10-CM

## 2024-05-22 PROCEDURE — 99204 OFFICE O/P NEW MOD 45 MIN: CPT | Performed by: UROLOGY

## 2024-05-22 ASSESSMENT — PAIN SCALES - GENERAL: PAINLEVEL: NO PAIN (0)

## 2024-05-22 NOTE — LETTER
5/22/2024       RE: Jose Alfredo Tomlinson  [address]  [place] [address]     Dear Colleague,    Thank you for referring your patient, Jose Alfredo Tomlinson, to the [facility] UROLOGY [facility] [place] at [facility]. Please see a copy of my visit note below.    Urology Clinic               Chief Complaint:   Cyst on the transplant kidney           Consult or Referral:     Jose Alfredo Tomlinson is a 63 year old male seen in consultation.         History of Present Illness:    Jose Alfredo Tomlinson is a very pleasant 63 year old male with history of kidney transplant who has been followed for a small cystic lesion on the transplant kidney for the past few years.  The lesion had grown almost 3 mm over the past few years.  He is referred to me for further evaluation.    He denies any gross hematuria, recurrent urine tract infections or left lower quadrant pain.  ECOG 1-2         Past Medical History:     Past Medical History:   Diagnosis Date    Cataracts     Foot drop     Gastroparesis     GERD (gastroesophageal reflux disease)     History of diabetes mellitus, type I     Hyperlipidemia     Hypertension     Pancreas transplanted (H) 2007    hx of rejection in past     Peripheral neuropathy     S/P kidney transplant 2007            Past Surgical History:     Past Surgical History:   Procedure Laterality Date    CATARACT IOL, RT/LT Bilateral     COLONOSCOPY  12/31/2013    Procedure: COMBINED COLONOSCOPY, SINGLE BIOPSY/POLYPECTOMY BY BIOPSY;  COLONOSCOPY;  Surgeon: Isac Colby MD;  Location:  GI    ESOPHAGOSCOPY, GASTROSCOPY, DUODENOSCOPY (EGD), COMBINED  1/20/2012    Procedure:COMBINED ESOPHAGOSCOPY, GASTROSCOPY, DUODENOSCOPY (EGD); Surgeon:GAB MARTIN; Location: GI    IR MISCELLANEOUS PROCEDURE  8/12/2003    IR MISCELLANEOUS PROCEDURE  8/12/2003    IR VENOUS PTA  8/12/2003    REPAIR PTOSIS Left 3/15/2019    Procedure: LEFT UPPER EYELID  PTOSIS REPAIR;  Surgeon: Rj Jose MD;  Location: [facility] OR    TRANSPLANT  2007    K/P            Problem List:     Patient Active Problem List    Diagnosis Date Noted    Immunosuppression (H24) 05/07/2019     Priority: Medium    Diabetic neuropathy, type I diabetes mellitus (H) 12/20/2017     Priority: Medium    Tactile anesthesia 12/20/2017     Priority: Medium    Personal history of diabetic foot ulcer 12/20/2017     Priority: Medium    Right foot drop 12/20/2017     Priority: Medium    Wrist drop, bilateral 12/20/2017     Priority: Medium    Kidney replaced by transplant 02/18/2017     Priority: Medium    Pancreas replaced by transplant (H) 02/18/2017     Priority: Medium    HTN, kidney transplant related 02/18/2017     Priority: Medium    Atrial bigeminy 03/03/2015     Priority: Medium     Noted on EKG.  Discussed w/ cardiology, no workup needed as long as asymptomatic.      Seborrheic keratosis 09/09/2013     Priority: Medium    Acquired perforating dermatosis 09/09/2013     Priority: Medium    PAD (peripheral artery disease) (H24) 08/20/2013     Priority: Medium     bobby stenosis of peroneal arteries on MRA      Hyperlipidemia      Priority: Medium    Peripheral neuropathy      Priority: Medium            Medications     Current Outpatient Medications   Medication Sig Dispense Refill    ASPIRIN LOW DOSE 81 MG EC tablet TAKE ONE TABLET BY MOUTH ONCE DAILY 90 tablet 3    lisinopril (ZESTRIL) 20 MG tablet Take 2 tablets (40 mg) by mouth daily 180 tablet 3    order for DME Equipment being ordered: therapeutic shoes and insoles. For Peripheral neuropathy, diabetes type 1 and poor circulation 1 Package 1    sodium bicarbonate 650 MG tablet Take 3 tablets (1,950 mg) by mouth 3 times daily 810 tablet 3    atorvastatin (LIPITOR) 10 MG tablet TAKE ONE TABLET BY MOUTH ONCE DAILY (Patient not taking: Reported on 5/22/2024) 90 tablet 3    mycophenolate (GENERIC EQUIVALENT) 500 MG tablet Take 1 tablet (500 mg) by mouth  2 times daily (Patient not taking: Reported on 5/22/2024) 60 tablet 11    omeprazole (PRILOSEC) 20 MG DR capsule Take 1 capsule (20 mg) by mouth 2 times daily (Patient not taking: Reported on 5/22/2024) 180 capsule 3    tacrolimus (GENERIC EQUIVALENT) 0.5 MG capsule Take 1 capsule (0.5 mg) by mouth 2 times daily Total dose 1.5 mg twice daily (Patient not taking: Reported on 5/22/2024) 60 capsule 11    tacrolimus (GENERIC EQUIVALENT) 1 MG capsule Take 1 capsule (1 mg) by mouth 2 times daily Total dose 1.5 mg twice daily. (Patient not taking: Reported on 5/22/2024) 60 capsule 11    vitamin C (ASCORBIC ACID) 500 MG tablet Take 2 tablets (1,000 mg) by mouth daily 30 tablet 1    vitamin C (ASCORBIC ACID) 500 MG tablet TAKE TWO TABLETS BY MOUTH ONCE DAILY 180 tablet 2    Vitamin D3 (CHOLECALCIFEROL) 25 mcg (1000 units) tablet Take 1 tablet (25 mcg) by mouth daily 30 tablet 2     Current Facility-Administered Medications   Medication Dose Route Frequency Provider Last Rate Last Admin    lidocaine 1% with EPINEPHrine 1:100,000 injection 3 mL  3 mL Intradermal Once Clayton Carvalho MD                Family History:     Family History   Problem Relation Age of Onset    Skin Cancer Mother     Arthritis Mother     Skin Cancer Father     Diabetes Father     Cancer Maternal Grandfather         Skin cancer    Skin Cancer Paternal Grandfather     Diabetes Paternal Grandfather     Cancer Paternal Grandfather         Skin cancer    Diabetes Son     Glaucoma No family hx of     Macular Degeneration No family hx of     Melanoma No family hx of             Social History:     Social History     Socioeconomic History    Marital status: Single     Spouse name: Not on file    Number of children: Not on file    Years of education: Not on file    Highest education level: Not on file   Occupational History    Not on file   Tobacco Use    Smoking status: Never    Smokeless tobacco: Never   Substance and Sexual Activity    Alcohol use: No     Drug use: No    Sexual activity: Yes     Partners: Female   Other Topics Concern    Parent/sibling w/ CABG, MI or angioplasty before 65F 55M? Not Asked   Social History Narrative    Lives alone      Social Determinants of Health     Financial Resource Strain: Not on file   Food Insecurity: Not on file   Transportation Needs: Not on file   Physical Activity: Not on file   Stress: Not on file   Social Connections: Not on file   Interpersonal Safety: Not on file   Housing Stability: Not on file            Allergies:   Patient has no known allergies.         Review of Systems:  From intake questionnaire     Negative 14 system review except as noted on HPI, nurse's note see below.         Physical Exam:     Patient is a 63 year old  male There is no height or weight on file to calculate BMI.  Constitutional: Vitals: Blood pressure (!) 174/92, pulse 70.  General Appearance Adult: Alert, no acute distress, oriented  HENT: throat/mouth:normal, good dentition  Neck: No adenopathy,masses or thyromegaly  Lungs/Repiratory: no respiratory distress, or pursed lip breathing  Heart: No obvious jugular venous distension present, normal heart rate  Abdomen: soft, nontender, no organomegaly or masses  Hematologic/Lymphatics: No cervical or supraclavicular adenopathy  Musculoskeltal: extremities normal, no peripheral edema  Skin: no noted suspicious lesions or rashes  Neuro: Alert, oriented, speech and mentation normal  Psych: affect and mood normal  Gait: Normal without assistance  : deferred          Labs and Pathology:    I personally reviewed all applicable laboratory and pathology data reviewed with patient      Lab Results   Component Value Date    WBC 6.6 11/27/2023    WBC 5.8 02/23/2021     Lab Results   Component Value Date    RBC 4.45 11/27/2023    RBC 4.11 02/23/2021     Lab Results   Component Value Date    HGB 12.7 11/27/2023    HGB 11.5 02/23/2021     Lab Results   Component Value Date    HCT 39.3 11/27/2023    HCT 37.2  "02/23/2021     No components found for: \"MCT\"  Lab Results   Component Value Date    MCV 88 11/27/2023    MCV 91 02/23/2021     Lab Results   Component Value Date    MCH 28.5 11/27/2023    MCH 28.0 02/23/2021     Lab Results   Component Value Date    MCHC 32.3 11/27/2023    MCHC 30.9 02/23/2021     Lab Results   Component Value Date    RDW 14.0 11/27/2023    RDW 13.5 02/23/2021     Lab Results   Component Value Date     11/27/2023     02/23/2021       Last Comprehensive Metabolic Panel:  Sodium   Date Value Ref Range Status   11/27/2023 140 135 - 145 mmol/L Final     Comment:     Reference intervals for this test were updated on 09/26/2023 to more accurately reflect our healthy population. There may be differences in the flagging of prior results with similar values performed with this method. Interpretation of those prior results can be made in the context of the updated reference intervals.    03/06/2021 143 133 - 144 mmol/L Final     Potassium   Date Value Ref Range Status   11/27/2023 4.0 3.4 - 5.3 mmol/L Final   10/03/2021 4.1 3.4 - 5.3 mmol/L Final   03/06/2021 5.1 3.4 - 5.3 mmol/L Final     Comment:     Specimen slightly hemolyzed, potassium may be falsely elevated     Chloride   Date Value Ref Range Status   11/27/2023 113 (H) 98 - 107 mmol/L Final   10/03/2021 112 (H) 94 - 109 mmol/L Final   03/06/2021 116 (H) 94 - 109 mmol/L Final     Carbon Dioxide   Date Value Ref Range Status   03/06/2021 21 20 - 32 mmol/L Final     Carbon Dioxide (CO2)   Date Value Ref Range Status   11/27/2023 17 (L) 22 - 29 mmol/L Final   10/03/2021 24 20 - 32 mmol/L Final     Anion Gap   Date Value Ref Range Status   11/27/2023 10 7 - 15 mmol/L Final   10/03/2021 5 3 - 14 mmol/L Final   03/06/2021 6 3 - 14 mmol/L Final     Glucose   Date Value Ref Range Status   11/27/2023 103 (H) 70 - 99 mg/dL Final   10/03/2021 100 (H) 70 - 99 mg/dL Final   03/06/2021 92 70 - 99 mg/dL Final     Urea Nitrogen   Date Value Ref Range " Status   11/27/2023 28.0 (H) 8.0 - 23.0 mg/dL Final   10/03/2021 25 7 - 30 mg/dL Final   03/06/2021 31 (H) 7 - 30 mg/dL Final     Creatinine   Date Value Ref Range Status   11/27/2023 0.97 0.67 - 1.17 mg/dL Final   03/06/2021 0.92 0.66 - 1.25 mg/dL Final     GFR Estimate   Date Value Ref Range Status   11/27/2023 89 >60 mL/min/1.73m2 Final   03/06/2021 >90 >60 mL/min/[1.73_m2] Final     Comment:     Non  GFR Calc  Starting 12/18/2018, serum creatinine based estimated GFR (eGFR) will be   calculated using the Chronic Kidney Disease Epidemiology Collaboration   (CKD-EPI) equation.       Calcium   Date Value Ref Range Status   11/27/2023 8.8 8.8 - 10.2 mg/dL Final   03/06/2021 8.3 (L) 8.5 - 10.1 mg/dL Final     Bilirubin Total   Date Value Ref Range Status   10/03/2021 0.4 0.2 - 1.3 mg/dL Final   12/22/2020 0.3 0.2 - 1.3 mg/dL Final     Alkaline Phosphatase   Date Value Ref Range Status   10/03/2021 93 40 - 150 U/L Final   12/22/2020 88 40 - 150 U/L Final     ALT   Date Value Ref Range Status   10/03/2021 16 0 - 70 U/L Final   12/22/2020 17 0 - 70 U/L Final     AST   Date Value Ref Range Status   10/03/2021 12 0 - 45 U/L Final   12/22/2020 10 0 - 45 U/L Final         INR   Date Value Ref Range Status   06/26/2016 1.10 0.86 - 1.14 Final                Imaging:    I personally viewed all applicable imaging and interpreted them and went over the results with the patient.  Mass/lesion details are as follows    The lesion is located on the lateral aspect of the upper pole of the transplant kidney.  The lack of contrast and imaging limits my ability to comment on the enhancement.           Assessment and Plan:     Assessment:  63-year-old male with slow-growing cyst on the transplant kidney    I reassured Jose Alfredo that this most likely is a benign lesion but again the lack of contrast would not allow me to comment on the enhancement of the lesion.  He understands that even if this turns out to be an enhancing  lesion and concerning for kidney cancer, the very slow growth rate of this is very reassuring and there is very small chance of metastasis at this point.  His transplant team has been following the lesion with serial imaging and I would agree with the approach.  I would recommend a kidney dedicated imaging with contrast for his next surveillance imaging.      Plan:  Continue to follow with transplant team with annual imaging in the form of MR renal with contrast or renal ultrasound  Return to urology clinic with significant increase in the size of the lesion (more than 6 mm a year)      45 total minutes spent on the date of the encounter including direct interaction with the patient, performing chart review, documentation and further activities as noted above    Christel Quintana MD   Department of Urology   [facility]

## 2024-05-22 NOTE — NURSING NOTE
Chief Complaint   Patient presents with    Cyst     Cyst of transplanted kidney       Blood pressure (!) 174/92, pulse 70. There is no height or weight on file to calculate BMI.    Patient Active Problem List   Diagnosis    Hyperlipidemia    Peripheral neuropathy    PAD (peripheral artery disease) (H24)    Seborrheic keratosis    Acquired perforating dermatosis    Atrial bigeminy    Kidney replaced by transplant    Pancreas replaced by transplant (H)    HTN, kidney transplant related    Diabetic neuropathy, type I diabetes mellitus (H)    Tactile anesthesia    Personal history of diabetic foot ulcer    Right foot drop    Wrist drop, bilateral    Immunosuppression (H24)       No Known Allergies    Current Outpatient Medications   Medication Sig Dispense Refill    ASPIRIN LOW DOSE 81 MG EC tablet TAKE ONE TABLET BY MOUTH ONCE DAILY 90 tablet 3    lisinopril (ZESTRIL) 20 MG tablet Take 2 tablets (40 mg) by mouth daily 180 tablet 3    order for DME Equipment being ordered: therapeutic shoes and insoles. For Peripheral neuropathy, diabetes type 1 and poor circulation 1 Package 1    sodium bicarbonate 650 MG tablet Take 3 tablets (1,950 mg) by mouth 3 times daily 810 tablet 3    atorvastatin (LIPITOR) 10 MG tablet TAKE ONE TABLET BY MOUTH ONCE DAILY (Patient not taking: Reported on 5/22/2024) 90 tablet 3    mycophenolate (GENERIC EQUIVALENT) 500 MG tablet Take 1 tablet (500 mg) by mouth 2 times daily (Patient not taking: Reported on 5/22/2024) 60 tablet 11    omeprazole (PRILOSEC) 20 MG DR capsule Take 1 capsule (20 mg) by mouth 2 times daily (Patient not taking: Reported on 5/22/2024) 180 capsule 3    tacrolimus (GENERIC EQUIVALENT) 0.5 MG capsule Take 1 capsule (0.5 mg) by mouth 2 times daily Total dose 1.5 mg twice daily (Patient not taking: Reported on 5/22/2024) 60 capsule 11    tacrolimus (GENERIC EQUIVALENT) 1 MG capsule Take 1 capsule (1 mg) by mouth 2 times daily Total dose 1.5 mg twice daily. (Patient not  taking: Reported on 5/22/2024) 60 capsule 11    vitamin C (ASCORBIC ACID) 500 MG tablet Take 2 tablets (1,000 mg) by mouth daily 30 tablet 1    vitamin C (ASCORBIC ACID) 500 MG tablet TAKE TWO TABLETS BY MOUTH ONCE DAILY 180 tablet 2    Vitamin D3 (CHOLECALCIFEROL) 25 mcg (1000 units) tablet Take 1 tablet (25 mcg) by mouth daily 30 tablet 2       Social History     Tobacco Use    Smoking status: Never    Smokeless tobacco: Never   Substance Use Topics    Alcohol use: No    Drug use: No       Mariel Horn  5/22/2024  4:19 PM

## 2024-05-22 NOTE — PROGRESS NOTES
Urology Clinic               Chief Complaint:   Cyst on the transplant kidney           Consult or Referral:     Jose Alfredo Tomlinson is a 63 year old male seen in consultation.         History of Present Illness:    Jose Alfredo Tomlinson is a very pleasant 63 year old male with history of kidney transplant who has been followed for a small cystic lesion on the transplant kidney for the past few years.  The lesion had grown almost 3 mm over the past few years.  He is referred to me for further evaluation.    He denies any gross hematuria, recurrent urine tract infections or left lower quadrant pain.  ECOG 1-2         Past Medical History:     Past Medical History:   Diagnosis Date    Cataracts     Foot drop     Gastroparesis     GERD (gastroesophageal reflux disease)     History of diabetes mellitus, type I     Hyperlipidemia     Hypertension     Pancreas transplanted (H) 2007    hx of rejection in past     Peripheral neuropathy     S/P kidney transplant 2007            Past Surgical History:     Past Surgical History:   Procedure Laterality Date    CATARACT IOL, RT/LT Bilateral     COLONOSCOPY  12/31/2013    Procedure: COMBINED COLONOSCOPY, SINGLE BIOPSY/POLYPECTOMY BY BIOPSY;  COLONOSCOPY;  Surgeon: Isac Colby MD;  Location:  GI    ESOPHAGOSCOPY, GASTROSCOPY, DUODENOSCOPY (EGD), COMBINED  1/20/2012    Procedure:COMBINED ESOPHAGOSCOPY, GASTROSCOPY, DUODENOSCOPY (EGD); Surgeon:GAB MARTIN; Location:[facility]    IR MISCELLANEOUS PROCEDURE  8/12/2003    IR MISCELLANEOUS PROCEDURE  8/12/2003    IR VENOUS PTA  8/12/2003    REPAIR PTOSIS Left 3/15/2019    Procedure: LEFT UPPER EYELID PTOSIS REPAIR;  Surgeon: Rj Jose MD;  Location:  OR    TRANSPLANT  2007    K/P            Problem List:     Patient Active Problem List    Diagnosis Date Noted    Immunosuppression (H24) 05/07/2019     Priority: Medium    Diabetic neuropathy, type I diabetes mellitus (H) 12/20/2017     Priority: Medium    Tactile  anesthesia 12/20/2017     Priority: Medium    Personal history of diabetic foot ulcer 12/20/2017     Priority: Medium    Right foot drop 12/20/2017     Priority: Medium    Wrist drop, bilateral 12/20/2017     Priority: Medium    Kidney replaced by transplant 02/18/2017     Priority: Medium    Pancreas replaced by transplant (H) 02/18/2017     Priority: Medium    HTN, kidney transplant related 02/18/2017     Priority: Medium    Atrial bigeminy 03/03/2015     Priority: Medium     Noted on EKG.  Discussed w/ cardiology, no workup needed as long as asymptomatic.      Seborrheic keratosis 09/09/2013     Priority: Medium    Acquired perforating dermatosis 09/09/2013     Priority: Medium    PAD (peripheral artery disease) (H24) 08/20/2013     Priority: Medium     bobby stenosis of peroneal arteries on MRA      Hyperlipidemia      Priority: Medium    Peripheral neuropathy      Priority: Medium            Medications     Current Outpatient Medications   Medication Sig Dispense Refill    ASPIRIN LOW DOSE 81 MG EC tablet TAKE ONE TABLET BY MOUTH ONCE DAILY 90 tablet 3    lisinopril (ZESTRIL) 20 MG tablet Take 2 tablets (40 mg) by mouth daily 180 tablet 3    order for DME Equipment being ordered: therapeutic shoes and insoles. For Peripheral neuropathy, diabetes type 1 and poor circulation 1 Package 1    sodium bicarbonate 650 MG tablet Take 3 tablets (1,950 mg) by mouth 3 times daily 810 tablet 3    atorvastatin (LIPITOR) 10 MG tablet TAKE ONE TABLET BY MOUTH ONCE DAILY (Patient not taking: Reported on 5/22/2024) 90 tablet 3    mycophenolate (GENERIC EQUIVALENT) 500 MG tablet Take 1 tablet (500 mg) by mouth 2 times daily (Patient not taking: Reported on 5/22/2024) 60 tablet 11    omeprazole (PRILOSEC) 20 MG DR capsule Take 1 capsule (20 mg) by mouth 2 times daily (Patient not taking: Reported on 5/22/2024) 180 capsule 3    tacrolimus (GENERIC EQUIVALENT) 0.5 MG capsule Take 1 capsule (0.5 mg) by mouth 2 times daily Total dose  1.5 mg twice daily (Patient not taking: Reported on 5/22/2024) 60 capsule 11    tacrolimus (GENERIC EQUIVALENT) 1 MG capsule Take 1 capsule (1 mg) by mouth 2 times daily Total dose 1.5 mg twice daily. (Patient not taking: Reported on 5/22/2024) 60 capsule 11    vitamin C (ASCORBIC ACID) 500 MG tablet Take 2 tablets (1,000 mg) by mouth daily 30 tablet 1    vitamin C (ASCORBIC ACID) 500 MG tablet TAKE TWO TABLETS BY MOUTH ONCE DAILY 180 tablet 2    Vitamin D3 (CHOLECALCIFEROL) 25 mcg (1000 units) tablet Take 1 tablet (25 mcg) by mouth daily 30 tablet 2     Current Facility-Administered Medications   Medication Dose Route Frequency Provider Last Rate Last Admin    lidocaine 1% with EPINEPHrine 1:100,000 injection 3 mL  3 mL Intradermal Once Clayton Carvalho MD                Family History:     Family History   Problem Relation Age of Onset    Skin Cancer Mother     Arthritis Mother     Skin Cancer Father     Diabetes Father     Cancer Maternal Grandfather         Skin cancer    Skin Cancer Paternal Grandfather     Diabetes Paternal Grandfather     Cancer Paternal Grandfather         Skin cancer    Diabetes Son     Glaucoma No family hx of     Macular Degeneration No family hx of     Melanoma No family hx of             Social History:     Social History     Socioeconomic History    Marital status: Single     Spouse name: Not on file    Number of children: Not on file    Years of education: Not on file    Highest education level: Not on file   Occupational History    Not on file   Tobacco Use    Smoking status: Never    Smokeless tobacco: Never   Substance and Sexual Activity    Alcohol use: No    Drug use: No    Sexual activity: Yes     Partners: Female   Other Topics Concern    Parent/sibling w/ CABG, MI or angioplasty before 65F 55M? Not Asked   Social History Narrative    Lives alone      Social Determinants of Health     Financial Resource Strain: Not on file   Food Insecurity: Not on file   Transportation  "Needs: Not on file   Physical Activity: Not on file   Stress: Not on file   Social Connections: Not on file   Interpersonal Safety: Not on file   Housing Stability: Not on file            Allergies:   Patient has no known allergies.         Review of Systems:  From intake questionnaire     Negative 14 system review except as noted on HPI, nurse's note see below.         Physical Exam:     Patient is a 63 year old  male There is no height or weight on file to calculate BMI.  Constitutional: Vitals: Blood pressure (!) 174/92, pulse 70.  General Appearance Adult: Alert, no acute distress, oriented  HENT: throat/mouth:normal, good dentition  Neck: No adenopathy,masses or thyromegaly  Lungs/Repiratory: no respiratory distress, or pursed lip breathing  Heart: No obvious jugular venous distension present, normal heart rate  Abdomen: soft, nontender, no organomegaly or masses  Hematologic/Lymphatics: No cervical or supraclavicular adenopathy  Musculoskeltal: extremities normal, no peripheral edema  Skin: no noted suspicious lesions or rashes  Neuro: Alert, oriented, speech and mentation normal  Psych: affect and mood normal  Gait: Normal without assistance  : deferred          Labs and Pathology:    I personally reviewed all applicable laboratory and pathology data reviewed with patient      Lab Results   Component Value Date    WBC 6.6 11/27/2023    WBC 5.8 02/23/2021     Lab Results   Component Value Date    RBC 4.45 11/27/2023    RBC 4.11 02/23/2021     Lab Results   Component Value Date    HGB 12.7 11/27/2023    HGB 11.5 02/23/2021     Lab Results   Component Value Date    HCT 39.3 11/27/2023    HCT 37.2 02/23/2021     No components found for: \"MCT\"  Lab Results   Component Value Date    MCV 88 11/27/2023    MCV 91 02/23/2021     Lab Results   Component Value Date    MCH 28.5 11/27/2023    MCH 28.0 02/23/2021     Lab Results   Component Value Date    MCHC 32.3 11/27/2023    MCHC 30.9 02/23/2021     Lab Results "   Component Value Date    RDW 14.0 11/27/2023    RDW 13.5 02/23/2021     Lab Results   Component Value Date     11/27/2023     02/23/2021       Last Comprehensive Metabolic Panel:  Sodium   Date Value Ref Range Status   11/27/2023 140 135 - 145 mmol/L Final     Comment:     Reference intervals for this test were updated on 09/26/2023 to more accurately reflect our healthy population. There may be differences in the flagging of prior results with similar values performed with this method. Interpretation of those prior results can be made in the context of the updated reference intervals.    03/06/2021 143 133 - 144 mmol/L Final     Potassium   Date Value Ref Range Status   11/27/2023 4.0 3.4 - 5.3 mmol/L Final   10/03/2021 4.1 3.4 - 5.3 mmol/L Final   03/06/2021 5.1 3.4 - 5.3 mmol/L Final     Comment:     Specimen slightly hemolyzed, potassium may be falsely elevated     Chloride   Date Value Ref Range Status   11/27/2023 113 (H) 98 - 107 mmol/L Final   10/03/2021 112 (H) 94 - 109 mmol/L Final   03/06/2021 116 (H) 94 - 109 mmol/L Final     Carbon Dioxide   Date Value Ref Range Status   03/06/2021 21 20 - 32 mmol/L Final     Carbon Dioxide (CO2)   Date Value Ref Range Status   11/27/2023 17 (L) 22 - 29 mmol/L Final   10/03/2021 24 20 - 32 mmol/L Final     Anion Gap   Date Value Ref Range Status   11/27/2023 10 7 - 15 mmol/L Final   10/03/2021 5 3 - 14 mmol/L Final   03/06/2021 6 3 - 14 mmol/L Final     Glucose   Date Value Ref Range Status   11/27/2023 103 (H) 70 - 99 mg/dL Final   10/03/2021 100 (H) 70 - 99 mg/dL Final   03/06/2021 92 70 - 99 mg/dL Final     Urea Nitrogen   Date Value Ref Range Status   11/27/2023 28.0 (H) 8.0 - 23.0 mg/dL Final   10/03/2021 25 7 - 30 mg/dL Final   03/06/2021 31 (H) 7 - 30 mg/dL Final     Creatinine   Date Value Ref Range Status   11/27/2023 0.97 0.67 - 1.17 mg/dL Final   03/06/2021 0.92 0.66 - 1.25 mg/dL Final     GFR Estimate   Date Value Ref Range Status   11/27/2023  89 >60 mL/min/1.73m2 Final   03/06/2021 >90 >60 mL/min/[1.73_m2] Final     Comment:     Non  GFR Calc  Starting 12/18/2018, serum creatinine based estimated GFR (eGFR) will be   calculated using the Chronic Kidney Disease Epidemiology Collaboration   (CKD-EPI) equation.       Calcium   Date Value Ref Range Status   11/27/2023 8.8 8.8 - 10.2 mg/dL Final   03/06/2021 8.3 (L) 8.5 - 10.1 mg/dL Final     Bilirubin Total   Date Value Ref Range Status   10/03/2021 0.4 0.2 - 1.3 mg/dL Final   12/22/2020 0.3 0.2 - 1.3 mg/dL Final     Alkaline Phosphatase   Date Value Ref Range Status   10/03/2021 93 40 - 150 U/L Final   12/22/2020 88 40 - 150 U/L Final     ALT   Date Value Ref Range Status   10/03/2021 16 0 - 70 U/L Final   12/22/2020 17 0 - 70 U/L Final     AST   Date Value Ref Range Status   10/03/2021 12 0 - 45 U/L Final   12/22/2020 10 0 - 45 U/L Final         INR   Date Value Ref Range Status   06/26/2016 1.10 0.86 - 1.14 Final                Imaging:    I personally viewed all applicable imaging and interpreted them and went over the results with the patient.  Mass/lesion details are as follows    The lesion is located on the lateral aspect of the upper pole of the transplant kidney.  The lack of contrast and imaging limits my ability to comment on the enhancement.           Assessment and Plan:     Assessment:  63-year-old male with slow-growing cyst on the transplant kidney    I reassured Jose Alfredo that this most likely is a benign lesion but again the lack of contrast would not allow me to comment on the enhancement of the lesion.  He understands that even if this turns out to be an enhancing lesion and concerning for kidney cancer, the very slow growth rate of this is very reassuring and there is very small chance of metastasis at this point.  His transplant team has been following the lesion with serial imaging and I would agree with the approach.  I would recommend a kidney dedicated imaging with  contrast for his next surveillance imaging.      Plan:  Continue to follow with transplant team with annual imaging in the form of MR renal with contrast or renal ultrasound  Return to urology clinic with significant increase in the size of the lesion (more than 6 mm a year)      45 total minutes spent on the date of the encounter including direct interaction with the patient, performing chart review, documentation and further activities as noted above      Christel Quintana MD   Department of Urology   [facility]

## 2024-06-03 DIAGNOSIS — Z94.83 PANCREAS REPLACED BY TRANSPLANT (H): ICD-10-CM

## 2024-06-03 DIAGNOSIS — Z48.298 AFTERCARE FOLLOWING ORGAN TRANSPLANT: ICD-10-CM

## 2024-06-03 DIAGNOSIS — Z94.0 KIDNEY REPLACED BY TRANSPLANT: Primary | ICD-10-CM

## 2024-06-15 ENCOUNTER — HEALTH MAINTENANCE LETTER (OUTPATIENT)
Age: 64
End: 2024-06-15

## 2024-06-18 DIAGNOSIS — Z94.0 KIDNEY TRANSPLANTED: ICD-10-CM

## 2024-06-18 DIAGNOSIS — Z94.83 PANCREAS TRANSPLANTED (H): ICD-10-CM

## 2024-06-18 DIAGNOSIS — Z94.0 S/P KIDNEY TRANSPLANT: ICD-10-CM

## 2024-06-18 DIAGNOSIS — E78.00 HIGH CHOLESTEROL: ICD-10-CM

## 2024-06-20 RX ORDER — ATORVASTATIN CALCIUM 10 MG/1
TABLET, FILM COATED ORAL
Qty: 90 TABLET | Refills: 3 | Status: SHIPPED | OUTPATIENT
Start: 2024-06-20

## 2024-06-20 RX ORDER — ASPIRIN 81 MG/1
TABLET, COATED ORAL
Qty: 90 TABLET | Refills: 3 | Status: SHIPPED | OUTPATIENT
Start: 2024-06-20

## 2024-08-16 DIAGNOSIS — K21.9 ESOPHAGEAL REFLUX: ICD-10-CM

## 2024-08-20 NOTE — TELEPHONE ENCOUNTER
omeprazole (PRILOSEC) 20 MG DR capsule       Last Written Prescription Date:  9/21/23  Last Fill Quantity: 180,   # refills: 3  Last Office Visit : 7/5/23  Future Office visit:  None    Francisca refill given with note to call for return visit.     Maria Alejandra AHUJA RN  P Central Nursing/Red Flag Triage & Med Refill Team

## 2024-08-22 ENCOUNTER — TELEPHONE (OUTPATIENT)
Dept: TRANSPLANT | Facility: CLINIC | Age: 64
End: 2024-08-22
Payer: MEDICARE

## 2024-08-22 NOTE — TELEPHONE ENCOUNTER
Issue:  Significantly overdue for transplant labs despite reminders.      Plan:  Call Jose Alfredo:  Request a full set of transplant labs, including a good drug level, be drawn in the next 1-2 weeks.  Educate the patient on the importance of lab compliance in monitoring the health of their transplant.    LPN Task:  Please call with above plan.  Thanks!

## 2024-08-22 NOTE — TELEPHONE ENCOUNTER
Call placed to patient. Patient v\u to complete full set of transplant labs in 1-2 weeks then monthly afterwards. Orders placed

## 2024-08-24 ENCOUNTER — HEALTH MAINTENANCE LETTER (OUTPATIENT)
Age: 64
End: 2024-08-24

## 2024-10-10 ENCOUNTER — TELEPHONE (OUTPATIENT)
Dept: TRANSPLANT | Facility: CLINIC | Age: 64
End: 2024-10-10
Payer: MEDICARE

## 2024-10-10 DIAGNOSIS — Z94.0 KIDNEY REPLACED BY TRANSPLANT: ICD-10-CM

## 2024-10-10 DIAGNOSIS — R80.9 PROTEINURIA, UNSPECIFIED TYPE: ICD-10-CM

## 2024-10-10 DIAGNOSIS — Z94.83 PANCREAS REPLACED BY TRANSPLANT (H): Primary | ICD-10-CM

## 2024-10-10 NOTE — TELEPHONE ENCOUNTER
Call placed to patient. No answer. Voice message left instructing patient to complete labs next week. [employer] Liaison message sent

## 2024-10-10 NOTE — TELEPHONE ENCOUNTER
ISSUE  Significantly overdue for transplant labs despite multiple reminders.      PLAN  Call Jose Alfredo:  Advise a full set of transplant labs in the next week.  Labs will include urine sample for a proteinuria work up.        LPN task:  Please call with above plan  Thanks!

## 2024-10-17 ENCOUNTER — LAB (OUTPATIENT)
Dept: LAB | Facility: CLINIC | Age: 64
End: 2024-10-17
Payer: MEDICARE

## 2024-10-17 ENCOUNTER — TELEPHONE (OUTPATIENT)
Dept: TRANSPLANT | Facility: CLINIC | Age: 64
End: 2024-10-17

## 2024-10-17 DIAGNOSIS — Z20.828 CONTACT WITH AND (SUSPECTED) EXPOSURE TO OTHER VIRAL COMMUNICABLE DISEASES: ICD-10-CM

## 2024-10-17 DIAGNOSIS — R73.9 HYPERGLYCEMIA: ICD-10-CM

## 2024-10-17 DIAGNOSIS — I10 HYPERTENSION: ICD-10-CM

## 2024-10-17 DIAGNOSIS — Z94.83 PANCREAS REPLACED BY TRANSPLANT (H): ICD-10-CM

## 2024-10-17 DIAGNOSIS — Z98.890 OTHER SPECIFIED POSTPROCEDURAL STATES: ICD-10-CM

## 2024-10-17 DIAGNOSIS — R80.9 PROTEINURIA: ICD-10-CM

## 2024-10-17 DIAGNOSIS — Z94.0 KIDNEY REPLACED BY TRANSPLANT: ICD-10-CM

## 2024-10-17 DIAGNOSIS — D84.9 IMMUNOSUPPRESSION (H): ICD-10-CM

## 2024-10-17 DIAGNOSIS — Z48.298 AFTERCARE FOLLOWING ORGAN TRANSPLANT: ICD-10-CM

## 2024-10-17 DIAGNOSIS — Z48.22 ENCOUNTER FOR AFTERCARE FOLLOWING KIDNEY TRANSPLANT: Primary | ICD-10-CM

## 2024-10-17 DIAGNOSIS — D84.9 IMMUNOSUPPRESSED STATUS (H): ICD-10-CM

## 2024-10-17 LAB
ALBUMIN MFR UR ELPH: 127 MG/DL
ALBUMIN UR-MCNC: 30 MG/DL
AMYLASE SERPL-CCNC: 95 U/L (ref 28–100)
ANION GAP SERPL CALCULATED.3IONS-SCNC: 11 MMOL/L (ref 7–15)
APPEARANCE UR: CLEAR
BILIRUB UR QL STRIP: NEGATIVE
BK VIRUS SPECIMEN TYPE: NORMAL
BKV DNA # SPEC NAA+PROBE: NOT DETECTED IU/ML
BUN SERPL-MCNC: 53.2 MG/DL (ref 8–23)
C3 SERPL-MCNC: 108 MG/DL (ref 81–157)
C4 SERPL-MCNC: 22 MG/DL (ref 13–39)
CALCIUM SERPL-MCNC: 8.6 MG/DL (ref 8.8–10.4)
CHLORIDE SERPL-SCNC: 109 MMOL/L (ref 98–107)
COLOR UR AUTO: ABNORMAL
CREAT SERPL-MCNC: 1.54 MG/DL (ref 0.67–1.17)
CREAT UR-MCNC: 58.3 MG/DL
EGFRCR SERPLBLD CKD-EPI 2021: 51 ML/MIN/1.73M2
ERYTHROCYTE [DISTWIDTH] IN BLOOD BY AUTOMATED COUNT: 14 % (ref 10–15)
EST. AVERAGE GLUCOSE BLD GHB EST-MCNC: 103 MG/DL
GLUCOSE SERPL-MCNC: 88 MG/DL (ref 70–99)
GLUCOSE UR STRIP-MCNC: NEGATIVE MG/DL
HBA1C MFR BLD: 5.2 %
HCO3 SERPL-SCNC: 16 MMOL/L (ref 22–29)
HCT VFR BLD AUTO: 35.1 % (ref 40–53)
HGB BLD-MCNC: 10.9 G/DL (ref 13.3–17.7)
HGB UR QL STRIP: NEGATIVE
KAPPA LC FREE SER-MCNC: 2.24 MG/DL (ref 0.33–1.94)
KAPPA LC FREE/LAMBDA FREE SER NEPH: 0.95 {RATIO} (ref 0.26–1.65)
KETONES UR STRIP-MCNC: NEGATIVE MG/DL
LAMBDA LC FREE SERPL-MCNC: 2.37 MG/DL (ref 0.57–2.63)
LEUKOCYTE ESTERASE UR QL STRIP: ABNORMAL
LIPASE SERPL-CCNC: 54 U/L (ref 13–60)
MCH RBC QN AUTO: 27.9 PG (ref 26.5–33)
MCHC RBC AUTO-ENTMCNC: 31.1 G/DL (ref 31.5–36.5)
MCV RBC AUTO: 90 FL (ref 78–100)
MUCOUS THREADS #/AREA URNS LPF: PRESENT /LPF
NITRATE UR QL: NEGATIVE
PH UR STRIP: 7 [PH] (ref 5–7)
PLATELET # BLD AUTO: 173 10E3/UL (ref 150–450)
POTASSIUM SERPL-SCNC: 5.6 MMOL/L (ref 3.4–5.3)
PROT/CREAT 24H UR: 2.18 MG/MG CR (ref 0–0.2)
RBC # BLD AUTO: 3.91 10E6/UL (ref 4.4–5.9)
RBC URINE: 1 /HPF
SODIUM SERPL-SCNC: 136 MMOL/L (ref 135–145)
SP GR UR STRIP: 1.01 (ref 1–1.03)
SQUAMOUS EPITHELIAL: <1 /HPF
TACROLIMUS BLD-MCNC: 6 UG/L (ref 5–15)
TME LAST DOSE: NORMAL H
TME LAST DOSE: NORMAL H
TOTAL PROTEIN SERUM FOR ELP: 5.8 G/DL (ref 6.4–8.3)
UROBILINOGEN UR STRIP-MCNC: NORMAL MG/DL
WBC # BLD AUTO: 8.5 10E3/UL (ref 4–11)
WBC URINE: 7 /HPF

## 2024-10-17 PROCEDURE — 84165 PROTEIN E-PHORESIS SERUM: CPT | Mod: 26 | Performed by: PATHOLOGY

## 2024-10-17 PROCEDURE — 83520 IMMUNOASSAY QUANT NOS NONAB: CPT | Mod: 90 | Performed by: PATHOLOGY

## 2024-10-17 PROCEDURE — 87799 DETECT AGENT NOS DNA QUANT: CPT | Performed by: INTERNAL MEDICINE

## 2024-10-17 PROCEDURE — 85027 COMPLETE CBC AUTOMATED: CPT | Performed by: PATHOLOGY

## 2024-10-17 PROCEDURE — 81001 URINALYSIS AUTO W/SCOPE: CPT | Performed by: PATHOLOGY

## 2024-10-17 PROCEDURE — 84166 PROTEIN E-PHORESIS/URINE/CSF: CPT | Mod: 26 | Performed by: PATHOLOGY

## 2024-10-17 PROCEDURE — 99000 SPECIMEN HANDLING OFFICE-LAB: CPT | Performed by: PATHOLOGY

## 2024-10-17 PROCEDURE — 86160 COMPLEMENT ANTIGEN: CPT | Performed by: NURSE PRACTITIONER

## 2024-10-17 PROCEDURE — 86335 IMMUNFIX E-PHORSIS/URINE/CSF: CPT | Performed by: PATHOLOGY

## 2024-10-17 PROCEDURE — 86334 IMMUNOFIX E-PHORESIS SERUM: CPT | Mod: 26 | Performed by: PATHOLOGY

## 2024-10-17 PROCEDURE — 86334 IMMUNOFIX E-PHORESIS SERUM: CPT | Performed by: PATHOLOGY

## 2024-10-17 PROCEDURE — 80197 ASSAY OF TACROLIMUS: CPT | Performed by: INTERNAL MEDICINE

## 2024-10-17 PROCEDURE — 83036 HEMOGLOBIN GLYCOSYLATED A1C: CPT | Performed by: NURSE PRACTITIONER

## 2024-10-17 PROCEDURE — 84165 PROTEIN E-PHORESIS SERUM: CPT | Mod: TC | Performed by: PATHOLOGY

## 2024-10-17 PROCEDURE — 86833 HLA CLASS II HIGH DEFIN QUAL: CPT | Performed by: NURSE PRACTITIONER

## 2024-10-17 PROCEDURE — 80048 BASIC METABOLIC PNL TOTAL CA: CPT | Performed by: PATHOLOGY

## 2024-10-17 PROCEDURE — 84166 PROTEIN E-PHORESIS/URINE/CSF: CPT | Performed by: PATHOLOGY

## 2024-10-17 PROCEDURE — 83690 ASSAY OF LIPASE: CPT | Performed by: PATHOLOGY

## 2024-10-17 PROCEDURE — 84155 ASSAY OF PROTEIN SERUM: CPT | Performed by: PATHOLOGY

## 2024-10-17 PROCEDURE — 84156 ASSAY OF PROTEIN URINE: CPT | Performed by: PATHOLOGY

## 2024-10-17 PROCEDURE — 86832 HLA CLASS I HIGH DEFIN QUAL: CPT | Performed by: NURSE PRACTITIONER

## 2024-10-17 PROCEDURE — 82150 ASSAY OF AMYLASE: CPT | Performed by: PATHOLOGY

## 2024-10-17 PROCEDURE — 87086 URINE CULTURE/COLONY COUNT: CPT | Performed by: NURSE PRACTITIONER

## 2024-10-17 PROCEDURE — 83521 IG LIGHT CHAINS FREE EACH: CPT | Performed by: NURSE PRACTITIONER

## 2024-10-17 PROCEDURE — 36415 COLL VENOUS BLD VENIPUNCTURE: CPT | Performed by: PATHOLOGY

## 2024-10-17 PROCEDURE — 86335 IMMUNFIX E-PHORSIS/URINE/CSF: CPT | Mod: 26 | Performed by: PATHOLOGY

## 2024-10-17 NOTE — TELEPHONE ENCOUNTER
ISSUE  Creatinine elevated 1.54  Potassium elevated 5.6  Bicarb low at 16    PLAN  ----- Message from Naif Irvin sent at 10/17/2024  7:47 AM CDT -----  Elevated serum creatinine, high serum potassium and low bicarbonate level.  Recommend usual assessment for fever, recent illness, pain over kidney allograft, blood pressure and volume status, as well as would add 1/2 tsp of baking soda to sodium bicarbonate supplement and push fluids.  Will repeat labs.     OUTCOME  Spoke with patient, who is feeling well. No fevers, illness, or pain. Compliant with medications and supplements. He reports that he is likely not getting enough fluids. Encouraged to push hydration, add baking soda, and be mindful of dietary intake of potassium and repeat labs next week. He is agreeable to plan. Lab orders updated.

## 2024-10-18 LAB
ALBUMIN MFR UR ELPH: 1.9 %
ALBUMIN SERPL ELPH-MCNC: 3.8 G/DL (ref 3.7–5.1)
ALPHA1 GLOB MFR UR ELPH: 0.8 %
ALPHA1 GLOB SERPL ELPH-MCNC: 0.3 G/DL (ref 0.2–0.4)
ALPHA2 GLOB MFR UR ELPH: 22.8 %
ALPHA2 GLOB SERPL ELPH-MCNC: 0.7 G/DL (ref 0.5–0.9)
B-GLOBULIN MFR UR ELPH: 10.2 %
B-GLOBULIN SERPL ELPH-MCNC: 0.6 G/DL (ref 0.6–1)
GAMMA GLOB MFR UR ELPH: 64.3 %
GAMMA GLOB SERPL ELPH-MCNC: 0.4 G/DL (ref 0.7–1.6)
LOCATION OF TASK: ABNORMAL
LOCATION OF TASK: ABNORMAL
LOCATION OF TASK: NORMAL
LOCATION OF TASK: NORMAL
M PROTEIN MFR UR ELPH: 0 %
M PROTEIN SERPL ELPH-MCNC: 0 G/DL
PLA2R IGG SER IA-ACNC: <2 RU/ML
PROT ELPH PNL UR ELPH: NORMAL
PROT PATTERN SERPL ELPH-IMP: ABNORMAL
PROT PATTERN SERPL IFE-IMP: NORMAL
PROT PATTERN UR ELPH-IMP: ABNORMAL

## 2024-10-19 LAB — BACTERIA UR CULT: NO GROWTH

## 2024-10-28 ENCOUNTER — LAB REQUISITION (OUTPATIENT)
Dept: LAB | Facility: CLINIC | Age: 64
End: 2024-10-28
Payer: MEDICARE

## 2024-10-29 LAB
DONOR IDENTIFICATION: NORMAL
DONOR IDENTIFICATION: NORMAL
DSA COMMENTS: NORMAL
DSA COMMENTS: NORMAL
DSA PRESENT: NO
DSA PRESENT: NO
DSA TEST METHOD: NORMAL
DSA TEST METHOD: NORMAL
ORGAN: NORMAL
ORGAN: NORMAL
SA 1  COMMENTS: NORMAL
SA 1 CELL: NORMAL
SA 1 TEST METHOD: NORMAL
SA 2 CELL: NORMAL
SA 2 COMMENTS: NORMAL
SA 2 TEST METHOD: NORMAL
SA1 HI RISK ABY: NORMAL
SA1 MOD RISK ABY: NORMAL
SA2 HI RISK ABY: NORMAL
SA2 MOD RISK ABY: NORMAL
UNACCEPTABLE ANTIGENS: NORMAL
UNOS CPRA: 0

## 2024-11-27 NOTE — TELEPHONE ENCOUNTER
Call placed to patient. No answer. Detailed voice message left with instructions listed below. Order sent   Principal Discharge DX:	Chest pain  Secondary Diagnosis:	Palpitations   1

## 2024-12-23 DIAGNOSIS — I15.0 RENOVASCULAR HYPERTENSION: ICD-10-CM

## 2024-12-23 DIAGNOSIS — K21.9 ESOPHAGEAL REFLUX: ICD-10-CM

## 2024-12-23 DIAGNOSIS — R80.9 PROTEINURIA, UNSPECIFIED TYPE: ICD-10-CM

## 2024-12-23 DIAGNOSIS — Z94.0 KIDNEY REPLACED BY TRANSPLANT: ICD-10-CM

## 2024-12-23 RX ORDER — LISINOPRIL 20 MG/1
40 TABLET ORAL DAILY
Qty: 180 TABLET | Refills: 3 | Status: SHIPPED | OUTPATIENT
Start: 2024-12-23

## 2024-12-24 DIAGNOSIS — K21.9 ESOPHAGEAL REFLUX: ICD-10-CM

## 2024-12-30 NOTE — TELEPHONE ENCOUNTER
Signed 5 days ago (12/24/2024):   omeprazole (PRILOSEC) 20 MG DR capsule   Sig: Take 1 capsule (20 mg) by mouth 2 times daily.   Disp: 180 capsule    Refills: 0   Signed by: Bahman Harper APRN CNP     Addressed in a different encounter.

## 2025-01-14 ENCOUNTER — LAB (OUTPATIENT)
Dept: LAB | Facility: CLINIC | Age: 65
End: 2025-01-14
Payer: MEDICARE

## 2025-01-14 ENCOUNTER — OFFICE VISIT (OUTPATIENT)
Dept: TRANSPLANT | Facility: CLINIC | Age: 65
End: 2025-01-14
Attending: INTERNAL MEDICINE
Payer: MEDICARE

## 2025-01-14 VITALS
HEART RATE: 79 BPM | DIASTOLIC BLOOD PRESSURE: 86 MMHG | OXYGEN SATURATION: 97 % | SYSTOLIC BLOOD PRESSURE: 157 MMHG | WEIGHT: 177.6 LBS | BODY MASS INDEX: 24.08 KG/M2

## 2025-01-14 DIAGNOSIS — Z98.890 OTHER SPECIFIED POSTPROCEDURAL STATES: Primary | ICD-10-CM

## 2025-01-14 DIAGNOSIS — T86.19 LESION IN TRANSPLANTED KIDNEY: Primary | ICD-10-CM

## 2025-01-14 DIAGNOSIS — Z94.0 KIDNEY REPLACED BY TRANSPLANT: ICD-10-CM

## 2025-01-14 DIAGNOSIS — Z20.828 CONTACT WITH AND (SUSPECTED) EXPOSURE TO OTHER VIRAL COMMUNICABLE DISEASES: ICD-10-CM

## 2025-01-14 DIAGNOSIS — Z94.83 PANCREAS REPLACED BY TRANSPLANT (H): ICD-10-CM

## 2025-01-14 DIAGNOSIS — R80.9 PROTEINURIA, UNSPECIFIED TYPE: ICD-10-CM

## 2025-01-14 DIAGNOSIS — Z48.22 ENCOUNTER FOR AFTERCARE FOLLOWING KIDNEY TRANSPLANT: ICD-10-CM

## 2025-01-14 DIAGNOSIS — N28.9 LESION IN TRANSPLANTED KIDNEY: Primary | ICD-10-CM

## 2025-01-14 LAB
ALBUMIN MFR UR ELPH: 75.4 MG/DL
AMYLASE SERPL-CCNC: 82 U/L (ref 28–100)
ANION GAP SERPL CALCULATED.3IONS-SCNC: 8 MMOL/L (ref 7–15)
BUN SERPL-MCNC: 42.5 MG/DL (ref 8–23)
CALCIUM SERPL-MCNC: 9.2 MG/DL (ref 8.8–10.4)
CHLORIDE SERPL-SCNC: 113 MMOL/L (ref 98–107)
CREAT SERPL-MCNC: 1.28 MG/DL (ref 0.67–1.17)
CREAT UR-MCNC: 31.1 MG/DL
EGFRCR SERPLBLD CKD-EPI 2021: 63 ML/MIN/1.73M2
ERYTHROCYTE [DISTWIDTH] IN BLOOD BY AUTOMATED COUNT: 14 % (ref 10–15)
GLUCOSE SERPL-MCNC: 85 MG/DL (ref 70–99)
HCO3 SERPL-SCNC: 20 MMOL/L (ref 22–29)
HCT VFR BLD AUTO: 34.1 % (ref 40–53)
HGB BLD-MCNC: 10.3 G/DL (ref 13.3–17.7)
LIPASE SERPL-CCNC: 50 U/L (ref 13–60)
MCH RBC QN AUTO: 27.5 PG (ref 26.5–33)
MCHC RBC AUTO-ENTMCNC: 30.2 G/DL (ref 31.5–36.5)
MCV RBC AUTO: 91 FL (ref 78–100)
PLATELET # BLD AUTO: 183 10E3/UL (ref 150–450)
POTASSIUM SERPL-SCNC: 5.1 MMOL/L (ref 3.4–5.3)
PROT/CREAT 24H UR: 2.42 MG/MG CR (ref 0–0.2)
RBC # BLD AUTO: 3.75 10E6/UL (ref 4.4–5.9)
SODIUM SERPL-SCNC: 141 MMOL/L (ref 135–145)
TACROLIMUS BLD-MCNC: 4 UG/L (ref 5–15)
TME LAST DOSE: ABNORMAL H
TME LAST DOSE: ABNORMAL H
WBC # BLD AUTO: 7.5 10E3/UL (ref 4–11)

## 2025-01-14 PROCEDURE — 36415 COLL VENOUS BLD VENIPUNCTURE: CPT | Performed by: PATHOLOGY

## 2025-01-14 PROCEDURE — G0463 HOSPITAL OUTPT CLINIC VISIT: HCPCS | Performed by: NURSE PRACTITIONER

## 2025-01-14 PROCEDURE — 80197 ASSAY OF TACROLIMUS: CPT | Performed by: INTERNAL MEDICINE

## 2025-01-14 PROCEDURE — 83690 ASSAY OF LIPASE: CPT | Performed by: PATHOLOGY

## 2025-01-14 PROCEDURE — 82150 ASSAY OF AMYLASE: CPT | Performed by: PATHOLOGY

## 2025-01-14 PROCEDURE — 87799 DETECT AGENT NOS DNA QUANT: CPT | Performed by: INTERNAL MEDICINE

## 2025-01-14 PROCEDURE — 99000 SPECIMEN HANDLING OFFICE-LAB: CPT | Performed by: PATHOLOGY

## 2025-01-14 PROCEDURE — 85027 COMPLETE CBC AUTOMATED: CPT | Performed by: PATHOLOGY

## 2025-01-14 PROCEDURE — 80048 BASIC METABOLIC PNL TOTAL CA: CPT | Performed by: PATHOLOGY

## 2025-01-14 PROCEDURE — 84156 ASSAY OF PROTEIN URINE: CPT | Performed by: PATHOLOGY

## 2025-01-14 RX ORDER — SODIUM BICARBONATE 650 MG/1
1950 TABLET ORAL 3 TIMES DAILY
Qty: 810 TABLET | Refills: 3 | Status: SHIPPED | OUTPATIENT
Start: 2025-01-14 | End: 2026-01-14

## 2025-01-14 NOTE — LETTER
1/14/2025      Jose Alfredo Tomlinson  [address] [place] [address] [address]  [place] [address]      Dear Colleague,    Thank you for referring your patient, Jose Alfredo Tomlinson, to the [facility]. Please see a copy of my visit note below.        TRANSPLANT NEPHROLOGY CHRONIC POST TRANSPLANT VISIT    Recommendations:  - kidney transplant biopsy  - check blood pressures at home daily x 2 weeks, will have RNCC follow up with him for readings. If not at goal can add SGLT2i.   - Complete additional pending work up for proteinuria: UPEP, urine immunofixation and ROWDY.   - get contrast MRI to follow up on transplant renal lesion (ordered).     Assessment & Plan  # DDKT: Increased, creatinine as high as ~ 1.54 mg/dl on 10/17/24. Patient denies any illness at that time and reports good medication compliance. Creatinine now improved to ~ 1.28 mg/dl. Reports he has not been drinking enough fluids. Proteinuria now increased to ~ 2.4 g. Reviewed with Dr. Irvin and will need kidney transplant biopsy at this time.    - Baseline Creatinine:  ~ 0.9-1.1   - Proteinuria: Moderate (1-3 grams), UPCR 2.42 g/g 1/14/24   - Date DSA Last Checked: Oct/2024      Latest DSA: No, will repeat    - BK Viremia: No   - Kidney Tx Biopsy: No     # Pancreas Tx (SABINE):    - Pancreatic Exocrine Drainage: Enteric drained     - Blood glucose:  Euglycemia      On insulin: No   - HbA1c: Trend up      Latest HbA1c: 5.2%   - Pancreatic enzymes: Stable   - Date DSA Last Checked: Oct/2024  Latest DSA: No   - Pancreas Tx Biopsy: No     # Proteinuria:   -Increasing from 1.39g in 12/2022 to 2.57g in 11/2023 now 2.42 g        -Unremarkable serologies include  SPEP w/ ifx,  K/L, anti PLA2R, C3, C4.  Still needs UPEP w/ ifx and ROWDY.    -Proteinuria present since at least 12/2018 and increasing    - If BP still not controlled can add SGLT2i                   # Immunosuppression: Tacrolimus immediate release (goal 5-8) and Mycophenolate mofetil (dose 500  mg every 12 hours)   - Continue with intensive monitoring of immunosuppression for efficacy and toxicity.   - Changes: Not at this time    # Infection Prophylaxis:   - PJP: None. CD4 420 in 2/2021    # Hypertension: Inadequate control;  Goal BP: < 130/80   - Changes: Not at this time, cont  lisinopril 40 mg daily. Did not take his Lisinopril today and presents with BP ~ 157/86 mmHg. Recommend patient check blood pressureat home daily x 2 weeks. Will have RNCC follow up with him. If still not controlled, then can add SGLT2i.      BPs: not checking at home     # Anemia in Chronic Renal Disease: Hgb: Stable      SJ: No   - Iron studies: Not checked recently    # Mineral Bone Disorder:   - Secondary renal hyperparathyroidism; PTH level: Normal (18-80 pg/ml)        On treatment: None  - Vitamin D; level: Low        On supplement: Yes  - Calcium; level: Normal        On supplement: No  - Phosphorus; level: Normal        On supplement: No    # Electrolytes:   - Potassium; level: Normal        On supplement: No  - Magnesium; level: Not checked recently        On supplement: No  - Bicarbonate; level: Low        On supplement: Yes, bicarb 1950mg TID, + 1/2 tsp baking soda    # Renal lesion of kidney transplant: lesion in the superior pole measuring 1.2 x 1.4 x 1.4 cm on 3/2024 MRI (slow growth dating back to 8/5/2021 when it measured 0.9 x 0.7 x 0.8 cm). Seen by urology in 5/2024 and thought to likely be benign. Urology recommended  annual imaging  either by MRI renal with contrast or renal US. Will get contrast MRI imaging to reassess (Ok'd by Dr. Irvin).     # Skin Cancer: New lesions: none   - Discussed sun protection and recommend regular follow up with Dermatology.   -s/p Mohs 11/2022 for BCC on L nose    # Medical Compliance: No.  Evidence of labs less than recommended and infrequent transplant follow-up.  - Discussed importance of checking labs regularly as recommended, taking medications as prescribed and attending  scheduled medical appointments.    # Transplant History:  Etiology of Kidney Failure: Diabetes mellitus type 1  Tx: DDKT and SABINE  Transplant: 10/29/2007 (Pancreas), 3/28/2007 (Kidney)  Significant changes in immunosuppression: None  Significant transplant-related complications: None    Transplant Office Phone Number: [phone]    Assessment and plan was discussed with the patient and he voiced his understanding and agreement.    Return visit: No follow-ups on file.    RENETTA Parson CNP    Chief Complaint  Mr. Tomlinson is a 64 year old here for routine follow up, kidney transplant, pancreas transplant and immunosuppression management.    History of Present Illness  Since Mr. Tomlinson was last seen by this writer in January 2024, he has been doing fairly well.  He denies any hospitalizations, ED visits or urgent care visits.  No illnesses since he was last seen.  No fevers, sweats, chills.  No nausea, vomiting, diarrhea.  Appetite and energy are good.  He reports poor water intake.  No lower extremity edema.  No dysuria, hematuria.  BP elevated in clinic today 157/86 mmHg but reportedly did not take his lisinopril in the morning as he thought he had to be n.p.o. for labs. Creatinine as high as ~ 1.54 mg/dl on 10/17/24. Patient denies any illness at that time and reports good medication compliance. Creatinine now improved to ~ 1.28 mg/dl.  Random urine protein 2.5 g today which is stable from 1 year ago. Unremarkable serologies include  SPEP w/ ifx,  K/L, anti PLA2R, C3, C4.  Still needs UPEP w/ ifx and ROWDY.  No new skin cancers at last dermatology visit in 4/2024.  He was seen by urology for lesion of his kidney transplant, thought to likely be benign. Annual imaging recommended.       Home BP: Doesn't know         Problem List  Patient Active Problem List   Diagnosis     Hyperlipidemia     Peripheral neuropathy     PAD (peripheral artery disease)     Seborrheic keratosis     Acquired perforating  dermatosis     Atrial bigeminy     Kidney replaced by transplant     Pancreas replaced by transplant (H)     HTN, kidney transplant related     Diabetic neuropathy, type I diabetes mellitus (H)     Tactile anesthesia     Personal history of diabetic foot ulcer     Right foot drop     Wrist drop, bilateral     Immunosuppression       Allergies  No Known Allergies    Medications  Current Outpatient Medications   Medication Sig Dispense Refill     ASPIRIN LOW DOSE 81 MG EC tablet TAKE ONE TABLET BY MOUTH ONCE DAILY 90 tablet 3     atorvastatin (LIPITOR) 10 MG tablet TAKE ONE TABLET BY MOUTH ONCE DAILY 90 tablet 3     lisinopril (ZESTRIL) 20 MG tablet Take 2 tablets (40 mg) by mouth daily. 180 tablet 3     mycophenolate (GENERIC EQUIVALENT) 500 MG tablet Take 1 tablet (500 mg) by mouth 2 times daily (Patient not taking: Reported on 5/22/2024) 60 tablet 11     omeprazole (PRILOSEC) 20 MG DR capsule Take 1 capsule (20 mg) by mouth 2 times daily. 180 capsule 0     order for DME Equipment being ordered: therapeutic shoes and insoles. For Peripheral neuropathy, diabetes type 1 and poor circulation 1 Package 1     tacrolimus (GENERIC EQUIVALENT) 0.5 MG capsule Take 1 capsule (0.5 mg) by mouth 2 times daily Total dose 1.5 mg twice daily (Patient not taking: Reported on 5/22/2024) 60 capsule 11     tacrolimus (GENERIC EQUIVALENT) 1 MG capsule Take 1 capsule (1 mg) by mouth 2 times daily Total dose 1.5 mg twice daily. (Patient not taking: Reported on 5/22/2024) 60 capsule 11     vitamin C (ASCORBIC ACID) 500 MG tablet Take 2 tablets (1,000 mg) by mouth daily 30 tablet 1     vitamin C (ASCORBIC ACID) 500 MG tablet TAKE TWO TABLETS BY MOUTH ONCE DAILY 180 tablet 2     Vitamin D3 (CHOLECALCIFEROL) 25 mcg (1000 units) tablet Take 1 tablet (25 mcg) by mouth daily 30 tablet 2     Current Facility-Administered Medications   Medication Dose Route Frequency Provider Last Rate Last Admin     lidocaine 1% with EPINEPHrine 1:100,000  injection 3 mL  3 mL Intradermal Once Clayton Carvalho MD         There are no discontinued medications.    Physical Exam    There were no vitals taken for this visit.   GENERAL APPEARANCE: alert and no distress  HENT: mouth without ulcers or lesions  LYMPHATICS: no cervical or supraclavicular nodes  RESP: lungs clear to auscultation - no rales, rhonchi or wheezes  CV: regular rhythm, normal rate, no rub, no murmur  EDEMA: no LE edema bilaterally  ABDOMEN: soft, nondistended, nontender, bowel sounds normal  MS: extremities normal - no gross deformities noted, no evidence of inflammation in joints, no muscle tenderness  SKIN: no rash        Data        Latest Ref Rng & Units 10/17/2024     6:55 AM 11/27/2023     6:36 AM 4/12/2023     6:34 AM   Renal   Sodium 135 - 145 mmol/L 136  140  141    K 3.4 - 5.3 mmol/L 5.6  4.0  4.0    Cl 98 - 107 mmol/L 109  113  113    Cl (external) 98 - 107 mmol/L 109  113  113    CO2 22 - 29 mmol/L 16  17  17    Urea Nitrogen 8.0 - 23.0 mg/dL 53.2  28.0  32.4    Creatinine 0.67 - 1.17 mg/dL 1.54  0.97  1.06    Glucose 70 - 99 mg/dL 88  103  105    Calcium 8.8 - 10.4 mg/dL 8.6  8.8  9.2          Latest Ref Rng & Units 11/27/2023     6:36 AM 12/22/2020     8:04 AM 11/14/2010     6:29 AM   Bone Health   Phosphorus 2.5 - 4.5 mg/dL   4.1    Vit D Def 20 - 50 ng/mL 15  18           Latest Ref Rng & Units 10/17/2024     6:55 AM 11/27/2023     6:36 AM 4/12/2023     6:34 AM   Heme   WBC 4.0 - 11.0 10e3/uL 8.5  6.6  6.9    Hgb 13.3 - 17.7 g/dL 10.9  12.7  12.5    Plt 150 - 450 10e3/uL 173  179  153          Latest Ref Rng & Units 10/3/2021     8:43 AM 9/20/2021     8:10 AM 12/22/2020     8:04 AM   Liver   AP 40 - 150 U/L 93  82  88    TBili 0.2 - 1.3 mg/dL 0.4  --  0.3    Bilirubin Direct   --     ALT 0 - 70 U/L 16  22  17    AST 0 - 45 U/L 12  30  10    Tot Protein 6.8 - 8.8 g/dL 6.8  6.7  6.2    Albumin 3.4 - 5.0 g/dL 3.5  3.6  3.6          Latest Ref Rng & Units 10/17/2024     6:55 AM  11/27/2023     6:36 AM 4/12/2023     6:34 AM   Pancreas   A1C <5.7 % 5.2      Amylase 28 - 100 U/L 95  78  167    Lipase (Roche) 13 - 60 U/L 54  42  169          Latest Ref Rng & Units 1/25/2016    10:24 AM 11/16/2007     5:58 AM 3/29/2007    10:15 PM   Iron studies   Iron 35 - 180 ug/dL 85  38  22    Iron Saturation Index 15 - 46 % 27      Ferritin 26 - 388 ng/mL 53  368  726          Latest Ref Rng & Units 12/22/2020     8:05 AM 3/3/2015    11:07 AM 11/12/2010     9:26 PM   [facility]   CVM DNA Quant    Whole blood, EDTA anticoagulant    CMV Quant <100 Copies/mL   <100    CMV QT Log <2.0 Log copies/mL   <2.0    BK Spec  Plasma  Plasma     BK Res BKNEG^BK Virus DNA Not Detected copies/mL BK Virus DNA Not Detected  BK Virus DNA Not Detected     BK Log <2.7 Log copies/mL Not Calculated  Not Calculated   The Real-Time quantitative BK Virus assay was developed and its performance   characteristics determined by the Infectious Diseases Diagnostic Laboratory at   the [facility] in [place], [place]. The   primers and probes for each analyte are Analyte Specific Reagents (ASRs)   manufactured by [employer].   ASRs are used in many laboratory tests necessary for standard medical care and   generally do not require U.S. Food and Drug Administration approval. The FDA   has determined that such clearance or approval is not necessary.   This test is used for clinical purposes. It should not be regarded as   investigational or for research. This laboratory is certified under the   Clinical Laboratory Improvement Amendments of 1988 (CLIA-88) as qualified to   perform high complexity clinical laboratory testing.         Failed to redirect to the Timeline version of the REVFS SmartLink.  Recent Labs   Lab Test 03/30/23  0629 04/12/23  0634 11/27/23  0636 10/17/24  0655   DOSTAC 3/29/2023 4/11/2023  --  10/16/2024   TACROL 5.0 7.4 5.3 6.0             Again, thank you for allowing me to participate  in the care of your patient.        Sincerely,        RENETTA Parson CNP    Electronically signed

## 2025-01-14 NOTE — PROGRESS NOTES
TRANSPLANT NEPHROLOGY CHRONIC POST TRANSPLANT VISIT    Recommendations:  - kidney transplant biopsy  - check blood pressures at home daily x 2 weeks, will have RNCC follow up with him for readings. If not at goal can add SGLT2i.   - Complete additional pending work up for proteinuria: UPEP, urine immunofixation and ROWDY.   - get contrast MRI to follow up on transplant renal lesion (ordered).     Assessment & Plan   # DDKT: Increased, creatinine as high as ~ 1.54 mg/dl on 10/17/24. Patient denies any illness at that time and reports good medication compliance. Creatinine now improved to ~ 1.28 mg/dl. Reports he has not been drinking enough fluids. Proteinuria now increased to ~ 2.4 g. Reviewed with Dr. Irvin and will need kidney transplant biopsy at this time.    - Baseline Creatinine:  ~ 0.9-1.1   - Proteinuria: Moderate (1-3 grams), UPCR 2.42 g/g 1/14/24   - Date DSA Last Checked: Oct/2024      Latest DSA: No, will repeat    - BK Viremia: No   - Kidney Tx Biopsy: No     # Pancreas Tx (SABINE):    - Pancreatic Exocrine Drainage: Enteric drained     - Blood glucose:  Euglycemia      On insulin: No   - HbA1c: Trend up      Latest HbA1c: 5.2%   - Pancreatic enzymes: Stable   - Date DSA Last Checked: Oct/2024  Latest DSA: No   - Pancreas Tx Biopsy: No     # Proteinuria:   -Increasing from 1.39g in 12/2022 to 2.57g in 11/2023 now 2.42 g        -Unremarkable serologies include  SPEP w/ ifx,  K/L, anti PLA2R, C3, C4.  Still needs UPEP w/ ifx and ROWDY.    -Proteinuria present since at least 12/2018 and increasing    - If BP still not controlled can add SGLT2i                   # Immunosuppression: Tacrolimus immediate release (goal 5-8) and Mycophenolate mofetil (dose 500 mg every 12 hours)   - Continue with intensive monitoring of immunosuppression for efficacy and toxicity.   - Changes: Not at this time    # Infection Prophylaxis:   - PJP: None. CD4 420 in 2/2021    # Hypertension: Inadequate control;  Goal BP: <  130/80   - Changes: Not at this time, cont  lisinopril 40 mg daily. Did not take his Lisinopril today and presents with BP ~ 157/86 mmHg. Recommend patient check blood pressureat home daily x 2 weeks. Will have RNCC follow up with him. If still not controlled, then can add SGLT2i.      BPs: not checking at home     # Anemia in Chronic Renal Disease: Hgb: Stable      SJ: No   - Iron studies: Not checked recently    # Mineral Bone Disorder:   - Secondary renal hyperparathyroidism; PTH level: Normal (18-80 pg/ml)        On treatment: None  - Vitamin D; level: Low        On supplement: Yes  - Calcium; level: Normal        On supplement: No  - Phosphorus; level: Normal        On supplement: No    # Electrolytes:   - Potassium; level: Normal        On supplement: No  - Magnesium; level: Not checked recently        On supplement: No  - Bicarbonate; level: Low        On supplement: Yes, bicarb 1950mg TID, + 1/2 tsp baking soda    # Renal lesion of kidney transplant: lesion in the superior pole measuring 1.2 x 1.4 x 1.4 cm on 3/2024 MRI (slow growth dating back to 8/5/2021 when it measured 0.9 x 0.7 x 0.8 cm). Seen by urology in 5/2024 and thought to likely be benign. Urology recommended  annual imaging  either by MRI renal with contrast or renal US. Will get contrast MRI imaging to reassess (Ok'd by Dr. Irvin).     # Skin Cancer: New lesions: none   - Discussed sun protection and recommend regular follow up with Dermatology.   -s/p Mohs 11/2022 for BCC on L nose    # Medical Compliance: No.  Evidence of labs less than recommended and infrequent transplant follow-up.  - Discussed importance of checking labs regularly as recommended, taking medications as prescribed and attending scheduled medical appointments.    # Transplant History:  Etiology of Kidney Failure: Diabetes mellitus type 1  Tx: DDKT and SABINE  Transplant: 10/29/2007 (Pancreas), 3/28/2007 (Kidney)  Significant changes in immunosuppression: None  Significant  transplant-related complications: None    Transplant Office Phone Number: [phone]    Assessment and plan was discussed with the patient and he voiced his understanding and agreement.    Return visit: No follow-ups on file.    RENETTA Parson CNP    Chief Complaint   Mr. Tomlinson is a 64 year old here for routine follow up, kidney transplant, pancreas transplant and immunosuppression management.    History of Present Illness   Since Mr. Tomlinson was last seen by this writer in January 2024, he has been doing fairly well.  He denies any hospitalizations, ED visits or urgent care visits.  No illnesses since he was last seen.  No fevers, sweats, chills.  No nausea, vomiting, diarrhea.  Appetite and energy are good.  He reports poor water intake.  No lower extremity edema.  No dysuria, hematuria.  BP elevated in clinic today 157/86 mmHg but reportedly did not take his lisinopril in the morning as he thought he had to be n.p.o. for labs. Creatinine as high as ~ 1.54 mg/dl on 10/17/24. Patient denies any illness at that time and reports good medication compliance. Creatinine now improved to ~ 1.28 mg/dl.  Random urine protein 2.5 g today which is stable from 1 year ago. Unremarkable serologies include  SPEP w/ ifx,  K/L, anti PLA2R, C3, C4.  Still needs UPEP w/ ifx and ROWDY.  No new skin cancers at last dermatology visit in 4/2024.  He was seen by urology for lesion of his kidney transplant, thought to likely be benign. Annual imaging recommended.       Home BP: Doesn't know         Problem List   Patient Active Problem List   Diagnosis    Hyperlipidemia    Peripheral neuropathy    PAD (peripheral artery disease)    Seborrheic keratosis    Acquired perforating dermatosis    Atrial bigeminy    Kidney replaced by transplant    Pancreas replaced by transplant (H)    HTN, kidney transplant related    Diabetic neuropathy, type I diabetes mellitus (H)    Tactile anesthesia    Personal history of diabetic foot ulcer     Right foot drop    Wrist drop, bilateral    Immunosuppression       Allergies   No Known Allergies    Medications   Current Outpatient Medications   Medication Sig Dispense Refill    ASPIRIN LOW DOSE 81 MG EC tablet TAKE ONE TABLET BY MOUTH ONCE DAILY 90 tablet 3    atorvastatin (LIPITOR) 10 MG tablet TAKE ONE TABLET BY MOUTH ONCE DAILY 90 tablet 3    lisinopril (ZESTRIL) 20 MG tablet Take 2 tablets (40 mg) by mouth daily. 180 tablet 3    mycophenolate (GENERIC EQUIVALENT) 500 MG tablet Take 1 tablet (500 mg) by mouth 2 times daily (Patient not taking: Reported on 5/22/2024) 60 tablet 11    omeprazole (PRILOSEC) 20 MG DR capsule Take 1 capsule (20 mg) by mouth 2 times daily. 180 capsule 0    order for DME Equipment being ordered: therapeutic shoes and insoles. For Peripheral neuropathy, diabetes type 1 and poor circulation 1 Package 1    tacrolimus (GENERIC EQUIVALENT) 0.5 MG capsule Take 1 capsule (0.5 mg) by mouth 2 times daily Total dose 1.5 mg twice daily (Patient not taking: Reported on 5/22/2024) 60 capsule 11    tacrolimus (GENERIC EQUIVALENT) 1 MG capsule Take 1 capsule (1 mg) by mouth 2 times daily Total dose 1.5 mg twice daily. (Patient not taking: Reported on 5/22/2024) 60 capsule 11    vitamin C (ASCORBIC ACID) 500 MG tablet Take 2 tablets (1,000 mg) by mouth daily 30 tablet 1    vitamin C (ASCORBIC ACID) 500 MG tablet TAKE TWO TABLETS BY MOUTH ONCE DAILY 180 tablet 2    Vitamin D3 (CHOLECALCIFEROL) 25 mcg (1000 units) tablet Take 1 tablet (25 mcg) by mouth daily 30 tablet 2     Current Facility-Administered Medications   Medication Dose Route Frequency Provider Last Rate Last Admin    lidocaine 1% with EPINEPHrine 1:100,000 injection 3 mL  3 mL Intradermal Once Clayton Carvalho MD         There are no discontinued medications.    Physical Exam     There were no vitals taken for this visit.   GENERAL APPEARANCE: alert and no distress  HENT: mouth without ulcers or lesions  LYMPHATICS: no cervical or  supraclavicular nodes  RESP: lungs clear to auscultation - no rales, rhonchi or wheezes  CV: regular rhythm, normal rate, no rub, no murmur  EDEMA: no LE edema bilaterally  ABDOMEN: soft, nondistended, nontender, bowel sounds normal  MS: extremities normal - no gross deformities noted, no evidence of inflammation in joints, no muscle tenderness  SKIN: no rash        Data         Latest Ref Rng & Units 10/17/2024     6:55 AM 11/27/2023     6:36 AM 4/12/2023     6:34 AM   Renal   Sodium 135 - 145 mmol/L 136  140  141    K 3.4 - 5.3 mmol/L 5.6  4.0  4.0    Cl 98 - 107 mmol/L 109  113  113    Cl (external) 98 - 107 mmol/L 109  113  113    CO2 22 - 29 mmol/L 16  17  17    Urea Nitrogen 8.0 - 23.0 mg/dL 53.2  28.0  32.4    Creatinine 0.67 - 1.17 mg/dL 1.54  0.97  1.06    Glucose 70 - 99 mg/dL 88  103  105    Calcium 8.8 - 10.4 mg/dL 8.6  8.8  9.2          Latest Ref Rng & Units 11/27/2023     6:36 AM 12/22/2020     8:04 AM 11/14/2010     6:29 AM   Bone Health   Phosphorus 2.5 - 4.5 mg/dL   4.1    Vit D Def 20 - 50 ng/mL 15  18           Latest Ref Rng & Units 10/17/2024     6:55 AM 11/27/2023     6:36 AM 4/12/2023     6:34 AM   Heme   WBC 4.0 - 11.0 10e3/uL 8.5  6.6  6.9    Hgb 13.3 - 17.7 g/dL 10.9  12.7  12.5    Plt 150 - 450 10e3/uL 173  179  153          Latest Ref Rng & Units 10/3/2021     8:43 AM 9/20/2021     8:10 AM 12/22/2020     8:04 AM   Liver   AP 40 - 150 U/L 93  82  88    TBili 0.2 - 1.3 mg/dL 0.4  --  0.3    Bilirubin Direct   --     ALT 0 - 70 U/L 16  22  17    AST 0 - 45 U/L 12  30  10    Tot Protein 6.8 - 8.8 g/dL 6.8  6.7  6.2    Albumin 3.4 - 5.0 g/dL 3.5  3.6  3.6          Latest Ref Rng & Units 10/17/2024     6:55 AM 11/27/2023     6:36 AM 4/12/2023     6:34 AM   Pancreas   A1C <5.7 % 5.2      Amylase 28 - 100 U/L 95  78  167    Lipase (Roche) 13 - 60 U/L 54  42  169          Latest Ref Rng & Units 1/25/2016    10:24 AM 11/16/2007     5:58 AM 3/29/2007    10:15 PM   Iron studies   Iron 35 - 180 ug/dL  85  38  22    Iron Saturation Index 15 - 46 % 27      Ferritin 26 - 388 ng/mL 53  368  726          Latest Ref Rng & Units 12/22/2020     8:05 AM 3/3/2015    11:07 AM 11/12/2010     9:26 PM   [facility]   CVM DNA Quant    Whole blood, EDTA anticoagulant    CMV Quant <100 Copies/mL   <100    CMV QT Log <2.0 Log copies/mL   <2.0    BK Spec  Plasma  Plasma     BK Res BKNEG^BK Virus DNA Not Detected copies/mL BK Virus DNA Not Detected  BK Virus DNA Not Detected     BK Log <2.7 Log copies/mL Not Calculated  Not Calculated   The Real-Time quantitative BK Virus assay was developed and its performance   characteristics determined by the Infectious Diseases Diagnostic Laboratory at   the [facility] in [place], [place]. The   primers and probes for each analyte are Analyte Specific Reagents (ASRs)   manufactured by [employer].   ASRs are used in many laboratory tests necessary for standard medical care and   generally do not require U.S. Food and Drug Administration approval. The FDA   has determined that such clearance or approval is not necessary.   This test is used for clinical purposes. It should not be regarded as   investigational or for research. This laboratory is certified under the   Clinical Laboratory Improvement Amendments of 1988 (CLIA-88) as qualified to   perform high complexity clinical laboratory testing.         Failed to redirect to the Timeline version of the REVFS SmartLink.  Recent Labs   Lab Test 03/30/23  0629 04/12/23  0634 11/27/23  0636 10/17/24  0655   DOSTAC 3/29/2023 4/11/2023  --  10/16/2024   TACROL 5.0 7.4 5.3 6.0

## 2025-01-15 LAB
BK VIRUS SPECIMEN TYPE: NORMAL
BKV DNA # SPEC NAA+PROBE: NOT DETECTED IU/ML

## 2025-01-20 ENCOUNTER — MYC MEDICAL ADVICE (OUTPATIENT)
Dept: INTERVENTIONAL RADIOLOGY/VASCULAR | Facility: CLINIC | Age: 65
End: 2025-01-20
Payer: MEDICARE

## 2025-01-20 ENCOUNTER — TELEPHONE (OUTPATIENT)
Dept: TRANSPLANT | Facility: CLINIC | Age: 65
End: 2025-01-20
Payer: MEDICARE

## 2025-01-20 NOTE — TELEPHONE ENCOUNTER
Received a call from Jose Alfredo. He states he received a text message confirming his biopsy on Thursday, wondering if he missed communication.  Confirmed he is aware of biopsy on Thursday, check in at 12:00, no food after 4 am, clear liquids until 10:00 am, knows he is to take his meds with a sip of water except his aspirin.   No further questions.

## 2025-01-21 DIAGNOSIS — K21.9 ESOPHAGEAL REFLUX: ICD-10-CM

## 2025-01-23 ENCOUNTER — APPOINTMENT (OUTPATIENT)
Dept: MEDSURG UNIT | Facility: CLINIC | Age: 65
End: 2025-01-23
Attending: RADIOLOGY
Payer: MEDICARE

## 2025-01-23 ENCOUNTER — HOSPITAL ENCOUNTER (OUTPATIENT)
Facility: CLINIC | Age: 65
Discharge: HOME | End: 2025-01-23
Attending: RADIOLOGY | Admitting: RADIOLOGY
Payer: MEDICARE

## 2025-01-23 ENCOUNTER — APPOINTMENT (OUTPATIENT)
Dept: INTERVENTIONAL RADIOLOGY/VASCULAR | Facility: CLINIC | Age: 65
End: 2025-01-23
Attending: INTERNAL MEDICINE
Payer: MEDICARE

## 2025-01-23 VITALS
HEART RATE: 73 BPM | BODY MASS INDEX: 23.23 KG/M2 | WEIGHT: 171.3 LBS | DIASTOLIC BLOOD PRESSURE: 70 MMHG | SYSTOLIC BLOOD PRESSURE: 140 MMHG | OXYGEN SATURATION: 98 % | TEMPERATURE: 97.4 F | RESPIRATION RATE: 16 BRPM

## 2025-01-23 DIAGNOSIS — Z48.22 ENCOUNTER FOR AFTERCARE FOLLOWING KIDNEY TRANSPLANT: ICD-10-CM

## 2025-01-23 DIAGNOSIS — R79.89 ELEVATED SERUM CREATININE: ICD-10-CM

## 2025-01-23 DIAGNOSIS — R80.9 PROTEINURIA, UNSPECIFIED TYPE: ICD-10-CM

## 2025-01-23 DIAGNOSIS — Z94.0 KIDNEY REPLACED BY TRANSPLANT: ICD-10-CM

## 2025-01-23 LAB
AMYLASE SERPL-CCNC: 77 U/L (ref 28–100)
ANION GAP SERPL CALCULATED.3IONS-SCNC: 11 MMOL/L (ref 7–15)
ATRIAL RATE - MUSE: 72 BPM
BASOPHILS # BLD AUTO: 0.1 10E3/UL (ref 0–0.2)
BASOPHILS NFR BLD AUTO: 1 %
BUN SERPL-MCNC: 57.5 MG/DL (ref 8–23)
CALCIUM SERPL-MCNC: 9 MG/DL (ref 8.8–10.4)
CHLORIDE SERPL-SCNC: 113 MMOL/L (ref 98–107)
CREAT SERPL-MCNC: 1.34 MG/DL (ref 0.67–1.17)
DIASTOLIC BLOOD PRESSURE - MUSE: NORMAL MMHG
EGFRCR SERPLBLD CKD-EPI 2021: 60 ML/MIN/1.73M2
EOSINOPHIL # BLD AUTO: 0.2 10E3/UL (ref 0–0.7)
EOSINOPHIL NFR BLD AUTO: 3 %
ERYTHROCYTE [DISTWIDTH] IN BLOOD BY AUTOMATED COUNT: 14 % (ref 10–15)
GLUCOSE SERPL-MCNC: 85 MG/DL (ref 70–99)
HCO3 SERPL-SCNC: 14 MMOL/L (ref 22–29)
HCT VFR BLD AUTO: 34 % (ref 40–53)
HGB BLD-MCNC: 10.4 G/DL (ref 13.3–17.7)
IMM GRANULOCYTES # BLD: 0 10E3/UL
IMM GRANULOCYTES NFR BLD: 1 %
INR PPP: 1.11 (ref 0.85–1.15)
INTERPRETATION ECG - MUSE: NORMAL
LIPASE SERPL-CCNC: 49 U/L (ref 13–60)
LYMPHOCYTES # BLD AUTO: 1 10E3/UL (ref 0.8–5.3)
LYMPHOCYTES NFR BLD AUTO: 15 %
MCH RBC QN AUTO: 27.7 PG (ref 26.5–33)
MCHC RBC AUTO-ENTMCNC: 30.6 G/DL (ref 31.5–36.5)
MCV RBC AUTO: 91 FL (ref 78–100)
MONOCYTES # BLD AUTO: 0.5 10E3/UL (ref 0–1.3)
MONOCYTES NFR BLD AUTO: 7 %
NEUTROPHILS # BLD AUTO: 4.7 10E3/UL (ref 1.6–8.3)
NEUTROPHILS NFR BLD AUTO: 73 %
NRBC # BLD AUTO: 0 10E3/UL
NRBC BLD AUTO-RTO: 0 /100
P AXIS - MUSE: 17 DEGREES
PLATELET # BLD AUTO: 197 10E3/UL (ref 150–450)
POTASSIUM SERPL-SCNC: 5.8 MMOL/L (ref 3.4–5.3)
PR INTERVAL - MUSE: 182 MS
QRS DURATION - MUSE: 72 MS
QT - MUSE: 378 MS
QTC - MUSE: 413 MS
R AXIS - MUSE: -13 DEGREES
RBC # BLD AUTO: 3.75 10E6/UL (ref 4.4–5.9)
SODIUM SERPL-SCNC: 138 MMOL/L (ref 135–145)
SYSTOLIC BLOOD PRESSURE - MUSE: NORMAL MMHG
T AXIS - MUSE: 14 DEGREES
VENTRICULAR RATE- MUSE: 72 BPM
WBC # BLD AUTO: 6.4 10E3/UL (ref 4–11)

## 2025-01-23 PROCEDURE — 83690 ASSAY OF LIPASE: CPT | Performed by: INTERNAL MEDICINE

## 2025-01-23 PROCEDURE — 85025 COMPLETE CBC W/AUTO DIFF WBC: CPT | Performed by: INTERNAL MEDICINE

## 2025-01-23 PROCEDURE — 88305 TISSUE EXAM BY PATHOLOGIST: CPT | Mod: TC | Performed by: INTERNAL MEDICINE

## 2025-01-23 PROCEDURE — 99152 MOD SED SAME PHYS/QHP 5/>YRS: CPT

## 2025-01-23 PROCEDURE — 87799 DETECT AGENT NOS DNA QUANT: CPT | Performed by: INTERNAL MEDICINE

## 2025-01-23 PROCEDURE — 50200 RENAL BIOPSY PERQ: CPT | Mod: LT | Performed by: RADIOLOGY

## 2025-01-23 PROCEDURE — 88346 IMFLUOR 1ST 1ANTB STAIN PX: CPT | Performed by: PATHOLOGY

## 2025-01-23 PROCEDURE — 76942 ECHO GUIDE FOR BIOPSY: CPT | Mod: 26 | Performed by: RADIOLOGY

## 2025-01-23 PROCEDURE — 36415 COLL VENOUS BLD VENIPUNCTURE: CPT | Performed by: INTERNAL MEDICINE

## 2025-01-23 PROCEDURE — 999N000142 HC STATISTIC PROCEDURE PREP ONLY

## 2025-01-23 PROCEDURE — 86833 HLA CLASS II HIGH DEFIN QUAL: CPT | Performed by: INTERNAL MEDICINE

## 2025-01-23 PROCEDURE — 999N000054 HC STATISTIC EKG NON-CHARGEABLE

## 2025-01-23 PROCEDURE — 82150 ASSAY OF AMYLASE: CPT | Performed by: INTERNAL MEDICINE

## 2025-01-23 PROCEDURE — 80048 BASIC METABOLIC PNL TOTAL CA: CPT | Performed by: INTERNAL MEDICINE

## 2025-01-23 PROCEDURE — 250N000009 HC RX 250: Performed by: PHYSICIAN ASSISTANT

## 2025-01-23 PROCEDURE — 88313 SPECIAL STAINS GROUP 2: CPT | Mod: 26 | Performed by: PATHOLOGY

## 2025-01-23 PROCEDURE — 88348 ELECTRON MICROSCOPY DX: CPT | Mod: 26 | Performed by: PATHOLOGY

## 2025-01-23 PROCEDURE — 88346 IMFLUOR 1ST 1ANTB STAIN PX: CPT | Mod: 26 | Performed by: PATHOLOGY

## 2025-01-23 PROCEDURE — 88350 IMFLUOR EA ADDL 1ANTB STN PX: CPT | Mod: 26 | Performed by: PATHOLOGY

## 2025-01-23 PROCEDURE — 85610 PROTHROMBIN TIME: CPT | Performed by: PHYSICIAN ASSISTANT

## 2025-01-23 PROCEDURE — 88313 SPECIAL STAINS GROUP 2: CPT | Mod: TC | Performed by: INTERNAL MEDICINE

## 2025-01-23 PROCEDURE — 99152 MOD SED SAME PHYS/QHP 5/>YRS: CPT | Performed by: RADIOLOGY

## 2025-01-23 PROCEDURE — 88341 IMHCHEM/IMCYTCHM EA ADD ANTB: CPT | Mod: TC | Performed by: INTERNAL MEDICINE

## 2025-01-23 PROCEDURE — 86832 HLA CLASS I HIGH DEFIN QUAL: CPT | Performed by: INTERNAL MEDICINE

## 2025-01-23 PROCEDURE — 999N000132 HC STATISTIC PP CARE STAGE 1

## 2025-01-23 PROCEDURE — 82374 ASSAY BLOOD CARBON DIOXIDE: CPT | Performed by: INTERNAL MEDICINE

## 2025-01-23 PROCEDURE — 250N000011 HC RX IP 250 OP 636: Performed by: PHYSICIAN ASSISTANT

## 2025-01-23 PROCEDURE — 93005 ELECTROCARDIOGRAM TRACING: CPT

## 2025-01-23 PROCEDURE — 88305 TISSUE EXAM BY PATHOLOGIST: CPT | Mod: 26 | Performed by: PATHOLOGY

## 2025-01-23 PROCEDURE — 88350 IMFLUOR EA ADDL 1ANTB STN PX: CPT | Performed by: PATHOLOGY

## 2025-01-23 RX ORDER — FENTANYL CITRATE 50 UG/ML
25-50 INJECTION, SOLUTION INTRAMUSCULAR; INTRAVENOUS EVERY 5 MIN PRN
Status: DISCONTINUED | OUTPATIENT
Start: 2025-01-23 | End: 2025-01-23 | Stop reason: HOSPADM

## 2025-01-23 RX ORDER — LIDOCAINE 40 MG/G
CREAM TOPICAL
Status: DISCONTINUED | OUTPATIENT
Start: 2025-01-23 | End: 2025-01-23 | Stop reason: HOSPADM

## 2025-01-23 RX ORDER — ONDANSETRON 2 MG/ML
4 INJECTION INTRAMUSCULAR; INTRAVENOUS
Status: COMPLETED | OUTPATIENT
Start: 2025-01-23 | End: 2025-01-23

## 2025-01-23 RX ORDER — NALOXONE HYDROCHLORIDE 0.4 MG/ML
0.2 INJECTION, SOLUTION INTRAMUSCULAR; INTRAVENOUS; SUBCUTANEOUS
Status: DISCONTINUED | OUTPATIENT
Start: 2025-01-23 | End: 2025-01-23 | Stop reason: HOSPADM

## 2025-01-23 RX ORDER — FLUMAZENIL 0.1 MG/ML
0.2 INJECTION, SOLUTION INTRAVENOUS
Status: DISCONTINUED | OUTPATIENT
Start: 2025-01-23 | End: 2025-01-23 | Stop reason: HOSPADM

## 2025-01-23 RX ORDER — NALOXONE HYDROCHLORIDE 0.4 MG/ML
0.4 INJECTION, SOLUTION INTRAMUSCULAR; INTRAVENOUS; SUBCUTANEOUS
Status: DISCONTINUED | OUTPATIENT
Start: 2025-01-23 | End: 2025-01-23 | Stop reason: HOSPADM

## 2025-01-23 RX ADMIN — FENTANYL CITRATE 25 MCG: 50 INJECTION, SOLUTION INTRAMUSCULAR; INTRAVENOUS at 14:31

## 2025-01-23 RX ADMIN — MIDAZOLAM 0.5 MG: 1 INJECTION INTRAMUSCULAR; INTRAVENOUS at 14:31

## 2025-01-23 RX ADMIN — FENTANYL CITRATE 50 MCG: 50 INJECTION, SOLUTION INTRAMUSCULAR; INTRAVENOUS at 14:21

## 2025-01-23 RX ADMIN — ONDANSETRON 4 MG: 2 INJECTION INTRAMUSCULAR; INTRAVENOUS at 14:12

## 2025-01-23 RX ADMIN — MIDAZOLAM 1 MG: 1 INJECTION INTRAMUSCULAR; INTRAVENOUS at 14:21

## 2025-01-23 RX ADMIN — FENTANYL CITRATE 25 MCG: 50 INJECTION, SOLUTION INTRAMUSCULAR; INTRAVENOUS at 14:38

## 2025-01-23 RX ADMIN — MIDAZOLAM 0.5 MG: 1 INJECTION INTRAMUSCULAR; INTRAVENOUS at 14:27

## 2025-01-23 RX ADMIN — FENTANYL CITRATE 25 MCG: 50 INJECTION, SOLUTION INTRAMUSCULAR; INTRAVENOUS at 14:26

## 2025-01-23 RX ADMIN — LIDOCAINE HYDROCHLORIDE 5 ML: 10 INJECTION, SOLUTION EPIDURAL; INFILTRATION; INTRACAUDAL; PERINEURAL at 14:38

## 2025-01-23 RX ADMIN — MIDAZOLAM 0.5 MG: 1 INJECTION INTRAMUSCULAR; INTRAVENOUS at 14:39

## 2025-01-23 ASSESSMENT — ACTIVITIES OF DAILY LIVING (ADL)
ADLS_ACUITY_SCORE: 41

## 2025-01-23 NOTE — PROGRESS NOTES
Prep for transplanted kidney biopsy. Pt alert and oriented. VSS. Uses walker and leg braces. Left arm AV fistula. Left hand piv. Appropriately NPO. Held ASA x 2 days. Pt sister will provide transportation home.

## 2025-01-23 NOTE — PROCEDURES
[facility]    Procedure: IR Procedure Note    Date/Time: 1/23/2025 2:56 PM    Performed by: Forrest Jones MD  Authorized by: James Jacinto MD  IR Fellow Physician:  Radiology Resident Physician: Forrest Jones MD    Pre Procedure Diagnosis: Proteinuria  Post Procedure Diagnosis: Same    UNIVERSAL PROTOCOL   Site Marked: Yes  Prior Images Obtained and Reviewed:  Yes  Required items: Required blood products, implants, devices and special equipment available    Patient identity confirmed:  Verbally with patient, arm band and hospital-assigned identification number  NA - No sedation, light sedation, or local anesthesia  Confirmation Checklist:  Patient's identity using two indicators, procedure was appropriate and matched the consent or emergent situation and correct equipment/implants were available  Time out: Immediately prior to the procedure a time out was called    Universal Protocol: the Joint Commission Universal Protocol was followed    Preparation: Patient was prepped and draped in usual sterile fashion    ESBL (mL):  0.5     ANESTHESIA    Local Anesthetic:  Lidocaine 1% without epinephrine  Anesthetic Total (mL):  7      SEDATION  Patient Sedated: Yes    Sedation Type:  Moderate (conscious) sedation  Sedation:  Fentanyl and midazolam  Vital signs: Vital signs monitored during sedation    Fluoroscopy Time: 0 minute(s)  Findings: No significant findings.    Specimens: core needle biopsy specimens sent for pathological analysis    Procedural Complications: None    Condition: Stable    Plan: Bedrest for 1 hour, before return to pre procedure activities.      PROCEDURE  Describe Procedure: A total of three passes were made, and three 18-gauge core needle biopsy samples were obtained and sent to the lab for analysis.  Patient Tolerance:  Patient tolerated the procedure well with no immediate complications  Length of time physician/provider present for 1:1  monitoring during sedation:  0-22 min

## 2025-01-23 NOTE — PRE-PROCEDURE
GENERAL PRE-PROCEDURE:     Verbal consent obtained?: Yes    Written consent obtained?: Yes    Risks and benefits: Risks, benefits and alternatives were discussed    Consent given by:  Patient  Patient states understanding of procedure being performed: Yes    Patient's understanding of procedure matches consent: Yes    Procedure consent matches procedure scheduled: Yes    Expected level of sedation:  Moderate  Appropriately NPO:  Yes  Mallampati  :  Grade 2- soft palate, base of uvula, tonsillar pillars, and portion of posterior pharyngeal wall visible  Lungs:  Lungs clear with good breath sounds bilaterally (Posterior auscultation not performed. Lungs otherwise clear.)  Heart:  Normal heart sounds and rate  History & Physical reviewed:  History and physical reviewed and no updates needed  Statement of review:  I have reviewed the lab findings, diagnostic data, medications, and the plan for sedation

## 2025-01-23 NOTE — PROGRESS NOTES
S/p transplanted kidney biopsy, LLQ. LLQ site intact. Pt alert and oriented. VSS. Denies pain/nausea. Eating and drinking.

## 2025-01-23 NOTE — IR NOTE
Patient Name: Jose Alfredo Tomlinson  Medical Record Number: [id number]  Today's Date: 1/23/2025    Procedure: Left transplant renal biopsy   Proceduralist: Dr. Robert Stephenson  Pathology present: Yes    Procedure Start: 1433  Procedure end: 1452  Sedation medications administered: Midazolam 2.5 mg, Fentanyl 125 mcg     Report given to: FRANK Rendon 2A  : N/A    Other Notes: Pt arrived to IR room 6 from . Consent reviewed. Pt denies any questions or concerns regarding procedure. Pt positioned supine and monitored per protocol.    3 cores obtained and sent to lab as ordered.    GelFoam used to close biopsy needle track.  Hemostasis achieved.    Patient to be on bedrest for 1 hour per Dr. Jacinto .    Zofran 4 mg IV given pre-procedure for nausea. No emesis.     Pt tolerated procedure without any noted complications. Pt transferred back to .

## 2025-01-23 NOTE — DISCHARGE INSTRUCTIONS
[facility]    Interventional Radiology  Patient Instructions Following Transplanted Kidney Biopsy    AFTER YOU GO HOME  If you were given sedation, for the first 24 hours: we recommend that an adult stay with you, DO NOT drive or operate machinery at home or at work, DO NOT smoke, DO NOT make any important or legal decisions.  DO NOT drink alcoholic beverages the day of your procedure  Drink plenty of fluids   Resume your regular diet, unless otherwise instructed by your Primary Physician  Relax and take it easy for 48 hours  DO NOT do any strenuous exercise or lifting (> 10 lbs) for at least 3 days following your procedure  Keep the dressing dry and in place for 24 hours. Replace with Band aid for 2 days.  Never leave a wet dressing in place.  Do not take a shower for at least 12 hours following your procedure. No tub bath, hot tub, or swimming for 5 days.  There should be minimum drainage from the biopsy site    CALL THE PHYSICIAN IF:  You start bleeding from the procedure site.  If you do start to bleed from that site, lie down flat and hold pressure on the site for a minimum of 10 minutes.  Your physician will tell you if you need to return to the hospital  You develop nausea or vomiting  You have excessive swelling, redness, or tenderness at the site  You have drainage that looks like it is infected.  You experience severe pain  You develop hives or a rash or unexplained itching  You develop shortness of breath  You develop a temperature of 101 degrees F or greater  You develop bloody clots or red urine after you are discharged      [facility] INTERVENTIONAL RADIOLOGY DEPARTMENT  Procedure Physician: ***                                     Date of procedure: January 23, 2025  Telephone Numbers: [phone]      Monday-Friday 7:30 am to 4:00 pm  [phone] After 4:00 pm Monday-Friday, Weekends & Holidays.   Ask for the Interventional Radiologist on call.  Someone is on call 24 hrs/day  [facility] toll  free number: [phone] Monday-Friday 8:00 am to 4:30 pm  [facility] Emergency Dept: [phone]

## 2025-01-23 NOTE — PROGRESS NOTES
Discharge criteria met. Pt able to eat, ambulate and void without difficulty. Urine clear. Discharge instructions reviewed with pt. Pt verbalized understanding of discharge instructions. PIV removed.       Was unable to collect Tac trough 2/2 to patient already taken med this morning.     Did not collect urine specimen (ordered by Dr. Irvin).

## 2025-01-24 ENCOUNTER — LAB REQUISITION (OUTPATIENT)
Dept: LAB | Facility: CLINIC | Age: 65
End: 2025-01-24
Payer: MEDICARE

## 2025-01-24 LAB
BK VIRUS SPECIMEN TYPE: NORMAL
BKV DNA # SPEC NAA+PROBE: NOT DETECTED IU/ML
DONOR IDENTIFICATION: NORMAL
DONOR IDENTIFICATION: NORMAL
DSA COMMENTS: NORMAL
DSA COMMENTS: NORMAL
DSA PRESENT: NO
DSA PRESENT: NO
DSA TEST METHOD: NORMAL
DSA TEST METHOD: NORMAL
ORGAN: NORMAL
ORGAN: NORMAL
SA 1  COMMENTS: NORMAL
SA 1 CELL: NORMAL
SA 1 TEST METHOD: NORMAL
SA 2 CELL: NORMAL
SA 2 COMMENTS: NORMAL
SA 2 TEST METHOD: NORMAL
SA1 HI RISK ABY: NORMAL
SA1 MOD RISK ABY: NORMAL
SA2 HI RISK ABY: NORMAL
SA2 MOD RISK ABY: NORMAL
UNACCEPTABLE ANTIGENS: NORMAL
UNOS CPRA: 0

## 2025-01-29 LAB
PATH REPORT.COMMENTS IMP SPEC: NORMAL
PATH REPORT.FINAL DX SPEC: NORMAL
PATH REPORT.GROSS SPEC: NORMAL
PATH REPORT.MICROSCOPIC SPEC OTHER STN: NORMAL
PATH REPORT.RELEVANT HX SPEC: NORMAL
PHOTO IMAGE: NORMAL

## 2025-01-30 ENCOUNTER — TELEPHONE (OUTPATIENT)
Dept: TRANSPLANT | Facility: CLINIC | Age: 65
End: 2025-01-30
Payer: MEDICARE

## 2025-01-30 DIAGNOSIS — Z94.0 KIDNEY REPLACED BY TRANSPLANT: Primary | ICD-10-CM

## 2025-01-30 DIAGNOSIS — I10 HYPERTENSION: ICD-10-CM

## 2025-01-30 NOTE — TELEPHONE ENCOUNTER
ISSUE  Recent kidney transplant biopsy for increasing proteinuria    From: Sandeep Mejia MD   Sent: 1/24/2025   8:35 AM CST   To: Draek Ruiz MD; *   Subject: prelimbiopsy result                              g2 ptc3 cg1, C4d pending     No significant signs of TcMR     20-30% IFTA, cv3 ah3, 10/19 global glomerulosclerosis       PLAN  Naif Irvin MD Harris, Kathleen, RN  No, proteinuria is likely secondary to chronic changes.  Would maximize blood pressure control and continue on ACEI.    Should have a general Nephrologist that is following him too.    John      OUTCOME  Call placed to Jose Alfredo. Discussed biopsy results. Discussed monitoring BP's  closely. Jose Alfredo states he needs to get a new BP cuff.   Discussed general nephrology referral and Jose Alfredo is agreeable.   Recommended lab compliance for close monitoring of kidney and pancreas function.

## 2025-01-31 ENCOUNTER — LAB REQUISITION (OUTPATIENT)
Dept: LAB | Facility: CLINIC | Age: 65
End: 2025-01-31
Payer: MEDICARE

## 2025-01-31 LAB
Lab: NORMAL
PERFORMING LABORATORY: NORMAL
SPECIMEN STATUS: NORMAL
TEST NAME: NORMAL

## 2025-02-03 LAB — MAYO MISC RESULT: NORMAL

## 2025-02-06 LAB
PATH REPORT.ADDENDUM SPEC: NORMAL
PATH REPORT.COMMENTS IMP SPEC: NORMAL
PATH REPORT.FINAL DX SPEC: NORMAL
PATH REPORT.GROSS SPEC: NORMAL
PATH REPORT.MICROSCOPIC SPEC OTHER STN: NORMAL
PATH REPORT.RELEVANT HX SPEC: NORMAL
PHOTO IMAGE: NORMAL

## 2025-04-01 DIAGNOSIS — K21.9 ESOPHAGEAL REFLUX: ICD-10-CM

## 2025-04-04 NOTE — TELEPHONE ENCOUNTER
**Overdue for visit, needed for further refills. Please schedule via [email or web address] or call the clinic**        Last Written Prescription:  omeprazole (PRILOSEC) 20 MG DR capsule 180 capsule 0 1/22/2025 -- No   Sig - Route: Take 1 capsule (20 mg) by mouth 2 times daily. Future refill requests should be sent to primary care, not transplant. - Oral   Sent to pharmacy as: Omeprazole 20 MG Oral Capsule Delayed Release (PriLOSEC)     ----------------------  Last Visit Date: 7/5/2023  Future Visit Date: 0    ----------------------    Prior ihsan refill was already given: patient has not been seen since 2023 by Health system PCP.  omeprazole (PRILOSEC) 20 MG DR capsule       Last Written Prescription Date:  9/21/23  Last Fill Quantity: 180,   # refills: 3  Last Office Visit : 7/5/23  Future Office visit:  None     Ihsan refill given with note to call for return visit.      Maria Alejandra AHUJA RN  [facility]/Red Flag Triage & Med Refill Team              Refill decision: Medication unable to be refilled by RN due to: Pt not seen within past 12 months, No FOV or FOV exceeds timeframe per protocol        Request from pharmacy:  Requested Prescriptions   Pending Prescriptions Disp Refills    omeprazole (PRILOSEC) 20 MG DR capsule 180 capsule 0     Sig: Take 1 capsule (20 mg) by mouth 2 times daily. Future refill requests should be sent to primary care, not transplant.       PPI Protocol Failed - 4/4/2025 12:13 PM        Failed - Recent (12 mo) or future (90 days) visit within the authorizing provider's specialty     The patient must have completed an in-person or virtual visit within the past 12 months or has a future visit scheduled within the next 90 days with the authorizing provider s specialty.  Urgent care and e-visits do not qualify as an office visit for this protocol.          Passed - Medication is active on med list and the sig matches. RN to manually verify dose and sig if red X/fail.     If the protocol passes (green check), you  do not need to verify med dose and sig.    A prescription matches if they are the same clinical intention.    For Example: once daily and every morning are the same.    The protocol can not identify upper and lower case letters as matching and will fail.     For Example: Take 1 tablet (50 mg) by mouth daily     TAKE 1 TABLET (50 MG) BY MOUTH DAILY    For all fails (red x), verify dose and sig.    If the refill does match what is on file, the RN can still proceed to approve the refill request.       If they do not match, route to the appropriate provider.             Passed - Medication indicated for associated diagnosis     The medication is prescribed for one or more of the following conditions:     Erosive esophagitis    Gastritis   Gastric hypersecretion   Gastric ulcer   Gastroesophageal reflux disease   Helicobacter pylori gastrointestinal tract infection   Ulcer of duodenum   Drug-induced peptic ulcer   Esophageal stricture   Gastrointestinal hemorrhage   Indigestion   Stress ulcer   Zollinger-Davies syndrome   Jon s esophagus   Laryngopharyngeal reflux   Epigastric Pain   Morbid Obesity   Cough   History of Peptic Ulcer   Esophageal Atresia, Stenosis and Fistula   Cystic Fibrosis  Bronchiectasis          Passed - Patient is age 18 or older         -FRANK Saez

## 2025-04-07 RX ORDER — OMEPRAZOLE 20 MG/1
20 CAPSULE, DELAYED RELEASE ORAL 2 TIMES DAILY
Qty: 60 CAPSULE | Refills: 0 | Status: SHIPPED | OUTPATIENT
Start: 2025-04-07

## 2025-04-10 ENCOUNTER — TELEPHONE (OUTPATIENT)
Dept: NEPHROLOGY | Facility: CLINIC | Age: 65
End: 2025-04-10
Payer: MEDICARE

## 2025-04-10 NOTE — TELEPHONE ENCOUNTER
Patient confirmed scheduled appointment:  Date: 10/10/25  Time: 8:00AM  Visit type: RETURN NEPH  Provider: SOURAV  Location: [facility] [place], [place]  Testing/imaging: LABS  Additional notes: N/A

## 2025-05-04 ENCOUNTER — HEALTH MAINTENANCE LETTER (OUTPATIENT)
Age: 65
End: 2025-05-04

## 2025-05-06 DIAGNOSIS — K21.9 ESOPHAGEAL REFLUX: ICD-10-CM

## 2025-05-07 RX ORDER — OMEPRAZOLE 20 MG/1
20 CAPSULE, DELAYED RELEASE ORAL 2 TIMES DAILY
Qty: 60 CAPSULE | Refills: 0 | Status: SHIPPED | OUTPATIENT
Start: 2025-05-07

## 2025-05-07 NOTE — TELEPHONE ENCOUNTER
Last Written Prescription:  omeprazole (PRILOSEC) 20 MG DR capsule 60 capsule 0 4/7/2025 -- No   Sig - Route: Take 1 capsule (20 mg) by mouth 2 times daily. See primary provider for further refills - Oral   Sent to pharmacy as: Omeprazole 20 MG Oral Capsule Delayed Release (PriLOSEC)   Class: E-Prescribe     ----------------------  Last Visit Date: 7/5/23  Future Visit Date: 5/23/25  ----------------------      Refill decision: Medication refilled per  Medication Refill in Ambulatory Care  policy.  Request from pharmacy:  Requested Prescriptions   Pending Prescriptions Disp Refills    omeprazole (PRILOSEC) 20 MG DR capsule [Pharmacy Med Name: OMEPRAZOLE 20MG CPDR] 60 capsule 0     Sig: TAKE ONE CAPSULE BY MOUTH TWICE A DAY       PPI Protocol Failed - 5/7/2025  4:22 PM        Failed - Recent (12 mo) or future (90 days) visit within the authorizing provider's specialty     The patient must have completed an in-person or virtual visit within the past 12 months or has a future visit scheduled within the next 90 days with the authorizing provider s specialty.  Urgent care and e-visits do not qualify as an office visit for this protocol.          Passed - Medication is active on med list and the sig matches. RN to manually verify dose and sig if red X/fail.     If the protocol passes (green check), you do not need to verify med dose and sig.    A prescription matches if they are the same clinical intention.    For Example: once daily and every morning are the same.    The protocol can not identify upper and lower case letters as matching and will fail.     For Example: Take 1 tablet (50 mg) by mouth daily     TAKE 1 TABLET (50 MG) BY MOUTH DAILY    For all fails (red x), verify dose and sig.    If the refill does match what is on file, the RN can still proceed to approve the refill request.       If they do not match, route to the appropriate provider.             Passed - Medication indicated for associated diagnosis      The medication is prescribed for one or more of the following conditions:     Erosive esophagitis    Gastritis   Gastric hypersecretion   Gastric ulcer   Gastroesophageal reflux disease   Helicobacter pylori gastrointestinal tract infection   Ulcer of duodenum   Drug-induced peptic ulcer   Esophageal stricture   Gastrointestinal hemorrhage   Indigestion   Stress ulcer   Zollinger-Davies syndrome   Jon s esophagus   Laryngopharyngeal reflux   Epigastric Pain   Morbid Obesity   Cough   History of Peptic Ulcer   Esophageal Atresia, Stenosis and Fistula   Cystic Fibrosis  Bronchiectasis          Passed - Patient is age 18 or older

## 2025-05-26 ENCOUNTER — PATIENT OUTREACH (OUTPATIENT)
Dept: CARE COORDINATION | Facility: CLINIC | Age: 65
End: 2025-05-26
Payer: MEDICARE

## 2025-05-28 ENCOUNTER — PATIENT OUTREACH (OUTPATIENT)
Dept: CARE COORDINATION | Facility: CLINIC | Age: 65
End: 2025-05-28
Payer: MEDICARE

## 2025-06-05 ENCOUNTER — OFFICE VISIT (OUTPATIENT)
Dept: DERMATOLOGY | Facility: CLINIC | Age: 65
End: 2025-06-05
Attending: PHYSICIAN ASSISTANT
Payer: MEDICARE

## 2025-06-05 DIAGNOSIS — D22.9 MULTIPLE NEVI: ICD-10-CM

## 2025-06-05 DIAGNOSIS — L81.4 LENTIGINES: ICD-10-CM

## 2025-06-05 DIAGNOSIS — D18.01 CHERRY ANGIOMA: ICD-10-CM

## 2025-06-05 DIAGNOSIS — B35.3 TINEA PEDIS OF BOTH FEET: ICD-10-CM

## 2025-06-05 DIAGNOSIS — Z12.83 ENCOUNTER FOR SCREENING FOR MALIGNANT NEOPLASM OF SKIN: Primary | ICD-10-CM

## 2025-06-05 DIAGNOSIS — L82.1 SEBORRHEIC KERATOSES: ICD-10-CM

## 2025-06-05 RX ORDER — PRENATAL VIT 91/IRON/FOLIC/DHA 28-975-200
COMBINATION PACKAGE (EA) ORAL 2 TIMES DAILY
Qty: 42 G | Refills: 1 | Status: SHIPPED | OUTPATIENT
Start: 2025-06-05

## 2025-06-05 NOTE — PATIENT INSTRUCTIONS
Proper skin care from [facility]:    -Eliminate harsh soaps as they strip the natural oils from the skin, often resulting in dry itchy skin ( i.e. Dial, Zest, [place])  -Use mild soaps such as Cetaphil or Dove Sensitive Skin in the shower. You do not need to use soap on arms, legs, and trunk every time you shower unless visibly soiled.   -Avoid hot or cold showers.  -After showering, lightly dry off and apply moisturizing within 2-3 minutes. This will help trap moisture in the skin.   -Aggressive use of a moisturizer at least 1-2 times a day to the entire body (including -Vanicream, Cetaphil, Aquaphor or Cerave) and moisturize hands after every washing.  -We recommend using moisturizers that come in a tub that needs to be scooped out, not a pump. This has more of an oil base. It will hold moisture in your skin much better than a water base moisturizer. The above recommended are non-pore clogging.      Wear a sunscreen with at least SPF 30 on your face, ears, neck and V of the chest daily. Wear sunscreen on other areas of the body if those areas are exposed to the sun throughout the day. Sunscreens can contain physical and/or chemical blockers. Physical blockers are less likely to clog pores, these include zinc oxide and titanium dioxide. Reapply every two hour and after swimming.     Sunscreen examples: https://www.ewg.org/sunscreen/    UV radiation  UVA radiation remains constant throughout the day and throughout the year. It is a longer wavelength than UVB and therefore penetrates deeper into the skin leading to immediate and delayed tanning, photoaging, and skin cancer. 70-80% of UVA and UVB radiation occurs between the hours of 10am-2pm.  UVB radiation  UVB radiation causes the most harmful effects and is more significant during the summer months. However, snow and ice can reflect UVB radiation leading to skin damage during the winter months as well. UVB radiation is responsible for tanning,  burning, inflammation, delayed erythema (pinkness), pigmentation (brown spots), and skin cancer.     I recommend self monthly full body exams and yearly full body exams with a dermatology provider. If you develop a new or changing lesion please follow up for examination. Most skin cancers are pink and scaly or pink and pearly. However, we do see blue/brown/black skin cancers.  Consider the ABCDEs of melanoma when giving yourself your monthly full body exam ( don't forget the groin, buttocks, feet, toes, etc). A-asymmetry, B-borders, C-color, D-diameter, E-elevation or evolving. If you see any of these changes please follow up in clinic. If you cannot see your back I recommend purchasing a hand held mirror to use with a larger wall mirror.       Checking for Skin Cancer  You can find cancer early by checking your skin each month. There are 3 kinds of skin cancer. They are melanoma, basal cell carcinoma, and squamous cell carcinoma. Doing monthly skin checks is the best way to find new marks or skin changes. Follow the instructions below for checking your skin.   The ABCDEs of checking moles for melanoma   Check your moles or growths for signs of melanoma using ABCDE:   Asymmetry: the sides of the mole or growth don t match  Border: the edges are ragged, notched, or blurred  Color: the color within the mole or growth varies  Diameter: the mole or growth is larger than 6 mm (size of a pencil eraser)  Evolving: the size, shape, or color of the mole or growth is changing (evolving is not shown in the images below)    Checking for other types of skin cancer  Basal cell carcinoma or squamous cell carcinoma have symptoms such as:     A spot or mole that looks different from all other marks on your skin  Changes in how an area feels, such as itching, tenderness, or pain  Changes in the skin's surface, such as oozing, bleeding, or scaliness  A sore that does not heal  New swelling or redness beyond the border of a  mole    Who s at risk?  Anyone can get skin cancer. But you are at greater risk if you have:   Fair skin, light-colored hair, or light-colored eyes  Many moles or abnormal moles on your skin  A history of sunburns from sunlight or tanning beds  A family history of skin cancer  A history of exposure to radiation or chemicals  A weakened immune system  If you have had skin cancer in the past, you are at risk for recurring skin cancer.   How to check your skin  Do your monthly skin checkups in front of a full-length mirror. Check all parts of your body, including your:   Head (ears, face, neck, and scalp)  Torso (front, back, and sides)  Arms (tops, undersides, upper, and lower armpits)  Hands (palms, backs, and fingers, including under the nails)  Buttocks and genitals  Legs (front, back, and sides)  Feet (tops, soles, toes, including under the nails, and between toes)  If you have a lot of moles, take digital photos of them each month. Make sure to take photos both up close and from a distance. These can help you see if any moles change over time.   Most skin changes are not cancer. But if you see any changes in your skin, call your doctor right away. Only he or she can diagnose a problem. If you have skin cancer, seeing your doctor can be the first step toward getting the treatment that could save your life.   [employer] last reviewed this educational content on 4/1/2019 2000-2020 [employer]. [address] [place] [address], [place] [address]. All rights reserved. This information is not intended as a substitute for professional medical care. Always follow your healthcare professional's instructions.       When should I call my doctor?  If you are worsening or not improving, please, contact us or seek urgent care as noted below.     Who should I call with questions (adults)?    [facility] [phone]  For urgent needs outside of business hours call the [facility] at  [phone] and ask for the dermatology resident on call to be paged  If this is a medical emergency and you are unable to reach an ER, Call 911      If you need a prescription refill, please contact your pharmacy. Refills are approved or denied by our Physicians during normal business hours, Monday through Friday.  Per office policy, refills will not be granted if you have not been seen within the past year (or sooner depending on the condition).

## 2025-06-05 NOTE — LETTER
6/5/2025      Jose Alfredo Tomlinson  [address] [place] [address] [address]  [place] [address]      Dear Colleague,    Thank you for referring your patient, Jose Alfredo Tomlinson, to the [facility]. Please see a copy of my visit note below.    [facility] Dermatology Note  Encounter Date: Jun 5, 2025  Office Visit     Reviewed patient's past medical history and pertinent chart review prior to patient's visit today.     Dermatology Problem List:  # Kidney and pancreas transplant, 2007, chronic immunosuppression  # BCC, L nose, s/p Mohs 11/15/2022  ____________________________________________    Assessment & Plan:     # Tinea pedis  - Start terbinafine 1% cream twice daily to the plantar and interdigital spaces until resolution. Recommend keeping the feet as dry as possible.     # Chronic immunosuppression  # Multiple nevi, trunk and extremities  # Solar lentigines  - No concerning features on dermoscopy. We discussed the importance of self exams at home.   - ABCDEs: Counseled ABCDEs of melanoma: Asymmetry, Border (irregularity), Color (not uniform, changes in color), Diameter (greater than 6 mm which is about the size of a pencil eraser), and Evolving (any changes in preexisting moles).  - Sun protection: Counseled SPF 30+ sunscreen, UPF clothing, sun avoidance, tanning bed avoidance.    # Cherry angiomas  # Seborrheic keratoses  - We discussed the benign nature of the skin lesions. No treatment required. Continued observation recommended. Follow up with any concerns.      Follow-up:  Annual for follow up full body skin exam, as needed for new or changing lesions or new concerns    All risks, benefits and alternatives were discussed with patient.  Patient is in agreement and understands the assessment and plan.  All questions were answered.  Pauline Dey PA-C  [facility] Dermatology    ____________________________________________    CC: Skin Check ([facility] no concerns  )    HPI:  Mr. Jose Alfredo Tomlinson is a(n) 64 year old male who presents today as a return patient for a full body skin cancer screening. The patient has a history of nonmelanoma skin cancer. The patient has a history of a kidney and pancreas transplant, chronically immunosuppressed with prograf and cellcept. No specific cutaneous concerns today. The patient reports trying to be diligent with photoprotection.      Physical Exam:  Vitals: There were no vitals taken for this visit.  SKIN: Total skin excluding the genitalia areas was performed. The exam included the scalp, face, neck, bilateral arms, chest, back, abdomen, bilateral legs, digits, mons pubis, buttocks, and nails.   - Balderas II.  - Interdigital maceration and scale involving the bilateral feet, consistent with tinea pedis.   - Multiple tan/brown macules and papules scattered throughout exam, consistent with benign nevi. No concerning features on dermoscopy.   - Scattered tan, homogenous macules scattered on sun exposed skin, consistent with solar lentigines.   - Scattered waxy, stuck on appearing papules and patches, consistent with seborrheic keratoses.    - Several 1-2 mm red dome shaped symmetric papules, consistent with cherry angiomas.     Medications:  Current Outpatient Medications   Medication Sig Dispense Refill     aspirin (ASPIRIN LOW DOSE) 81 MG EC tablet Take 1 tablet (81 mg) by mouth daily. 90 tablet 4     atorvastatin (LIPITOR) 10 MG tablet Take 1 tablet (10 mg) by mouth daily. 90 tablet 4     lisinopril (ZESTRIL) 20 MG tablet Take 2 tablets (40 mg) by mouth daily. 180 tablet 4     mycophenolate (GENERIC EQUIVALENT) 500 MG tablet Take 1 tablet (500 mg) by mouth 2 times daily. 60 tablet 2     omeprazole (PRILOSEC) 20 MG DR capsule Take 1 capsule (20 mg) by mouth 2 times daily. 180 capsule 4     order for DME Equipment being ordered: therapeutic shoes and insoles. For Peripheral neuropathy, diabetes type 1 and poor circulation 1 Package 1      sodium bicarbonate 650 MG tablet Take 3 tablets (1,950 mg) by mouth 3 times daily. 810 tablet 3     tacrolimus (GENERIC EQUIVALENT) 0.5 MG capsule Take 1 capsule (0.5 mg) by mouth 2 times daily. Total dose 1.5 mg twice daily 60 capsule 2     tacrolimus (GENERIC EQUIVALENT) 1 MG capsule Take 1 capsule (1 mg) by mouth 2 times daily. Total dose 1.5 mg twice daily. 60 capsule 2     No current facility-administered medications for this visit.      Past Medical History:   Patient Active Problem List   Diagnosis     Hyperlipidemia     Peripheral neuropathy     PAD (peripheral artery disease)     Seborrheic keratosis     Acquired perforating dermatosis     Atrial bigeminy     Kidney replaced by transplant     Pancreas replaced by transplant (H)     HTN, kidney transplant related     Diabetic neuropathy, type I diabetes mellitus (H)     Tactile anesthesia     Personal history of diabetic foot ulcer     Right foot drop     Wrist drop, bilateral     Immunosuppression     Past Medical History:   Diagnosis Date     Basal cell carcinoma      Cataracts      Foot drop      Gastroparesis      GERD (gastroesophageal reflux disease)      History of diabetes mellitus, type I      Hyperlipidemia      Hypertension      Pancreas transplanted (H) 2007    hx of rejection in past      Peripheral neuropathy      S/P kidney transplant 2007       CC Pauline Dey PA-C   Dermatology  [address] [place] [phone] on close of this encounter.    Again, thank you for allowing me to participate in the care of your patient.        Sincerely,        Pauline Dey PA-C    Electronically signed

## 2025-06-05 NOTE — PROGRESS NOTES
[facility] Dermatology Note  Encounter Date: Jun 5, 2025  Office Visit     Reviewed patient's past medical history and pertinent chart review prior to patient's visit today.     Dermatology Problem List:  # Kidney and pancreas transplant, 2007, chronic immunosuppression  # BCC, L nose, s/p Mohs 11/15/2022  ____________________________________________    Assessment & Plan:     # Tinea pedis  - Start terbinafine 1% cream twice daily to the plantar and interdigital spaces until resolution. Recommend keeping the feet as dry as possible.     # Chronic immunosuppression  # Multiple nevi, trunk and extremities  # Solar lentigines  - No concerning features on dermoscopy. We discussed the importance of self exams at home.   - ABCDEs: Counseled ABCDEs of melanoma: Asymmetry, Border (irregularity), Color (not uniform, changes in color), Diameter (greater than 6 mm which is about the size of a pencil eraser), and Evolving (any changes in preexisting moles).  - Sun protection: Counseled SPF 30+ sunscreen, UPF clothing, sun avoidance, tanning bed avoidance.    # Cherry angiomas  # Seborrheic keratoses  - We discussed the benign nature of the skin lesions. No treatment required. Continued observation recommended. Follow up with any concerns.      Follow-up:  Annual for follow up full body skin exam, as needed for new or changing lesions or new concerns    All risks, benefits and alternatives were discussed with patient.  Patient is in agreement and understands the assessment and plan.  All questions were answered.  Pauline Dey PA-C  [facility] Dermatology    ____________________________________________    CC: Skin Check ([facility] [place] [place] no concerns )    HPI:  Mr. Jose Alfredo Tomlinson is a(n) 64 year old male who presents today as a return patient for a full body skin cancer screening. The patient has a history of nonmelanoma skin cancer. The patient has a history of a kidney and pancreas transplant, chronically  immunosuppressed with prograf and cellcept. No specific cutaneous concerns today. The patient reports trying to be diligent with photoprotection.      Physical Exam:  Vitals: There were no vitals taken for this visit.  SKIN: Total skin excluding the genitalia areas was performed. The exam included the scalp, face, neck, bilateral arms, chest, back, abdomen, bilateral legs, digits, mons pubis, buttocks, and nails.   - Balderas II.  - Interdigital maceration and scale involving the bilateral feet, consistent with tinea pedis.   - Multiple tan/brown macules and papules scattered throughout exam, consistent with benign nevi. No concerning features on dermoscopy.   - Scattered tan, homogenous macules scattered on sun exposed skin, consistent with solar lentigines.   - Scattered waxy, stuck on appearing papules and patches, consistent with seborrheic keratoses.    - Several 1-2 mm red dome shaped symmetric papules, consistent with cherry angiomas.     Medications:  Current Outpatient Medications   Medication Sig Dispense Refill    aspirin (ASPIRIN LOW DOSE) 81 MG EC tablet Take 1 tablet (81 mg) by mouth daily. 90 tablet 4    atorvastatin (LIPITOR) 10 MG tablet Take 1 tablet (10 mg) by mouth daily. 90 tablet 4    lisinopril (ZESTRIL) 20 MG tablet Take 2 tablets (40 mg) by mouth daily. 180 tablet 4    mycophenolate (GENERIC EQUIVALENT) 500 MG tablet Take 1 tablet (500 mg) by mouth 2 times daily. 60 tablet 2    omeprazole (PRILOSEC) 20 MG DR capsule Take 1 capsule (20 mg) by mouth 2 times daily. 180 capsule 4    order for DME Equipment being ordered: therapeutic shoes and insoles. For Peripheral neuropathy, diabetes type 1 and poor circulation 1 Package 1    sodium bicarbonate 650 MG tablet Take 3 tablets (1,950 mg) by mouth 3 times daily. 810 tablet 3    tacrolimus (GENERIC EQUIVALENT) 0.5 MG capsule Take 1 capsule (0.5 mg) by mouth 2 times daily. Total dose 1.5 mg twice daily 60 capsule 2    tacrolimus (GENERIC  EQUIVALENT) 1 MG capsule Take 1 capsule (1 mg) by mouth 2 times daily. Total dose 1.5 mg twice daily. 60 capsule 2     No current facility-administered medications for this visit.      Past Medical History:   Patient Active Problem List   Diagnosis    Hyperlipidemia    Peripheral neuropathy    PAD (peripheral artery disease)    Seborrheic keratosis    Acquired perforating dermatosis    Atrial bigeminy    Kidney replaced by transplant    Pancreas replaced by transplant (H)    HTN, kidney transplant related    Diabetic neuropathy, type I diabetes mellitus (H)    Tactile anesthesia    Personal history of diabetic foot ulcer    Right foot drop    Wrist drop, bilateral    Immunosuppression     Past Medical History:   Diagnosis Date    Basal cell carcinoma     Cataracts     Foot drop     Gastroparesis     GERD (gastroesophageal reflux disease)     History of diabetes mellitus, type I     Hyperlipidemia     Hypertension     Pancreas transplanted (H) 2007    hx of rejection in past     Peripheral neuropathy     S/P kidney transplant 2007       CC Pauline Dey PA-C   Dermatology  [address] [place] [phone] on close of this encounter.

## 2025-06-18 DIAGNOSIS — E11.319 DIABETIC RETINOPATHY OF BOTH EYES (H): Primary | ICD-10-CM

## 2025-06-18 PROCEDURE — 2022F DILAT RTA XM EVC RTNOPTHY: CPT

## 2025-06-30 ENCOUNTER — OFFICE VISIT (OUTPATIENT)
Dept: OPHTHALMOLOGY | Facility: CLINIC | Age: 65
End: 2025-06-30
Payer: MEDICARE

## 2025-06-30 DIAGNOSIS — E11.319 DIABETIC RETINOPATHY OF BOTH EYES (H): ICD-10-CM

## 2025-06-30 DIAGNOSIS — E10.3553 STABLE PROLIFERATIVE DIABETIC RETINOPATHY OF BOTH EYES ASSOCIATED WITH TYPE 1 DIABETES MELLITUS (H): Primary | ICD-10-CM

## 2025-06-30 DIAGNOSIS — E10.40 DIABETIC NEUROPATHY, TYPE I DIABETES MELLITUS (H): ICD-10-CM

## 2025-06-30 PROCEDURE — G0463 HOSPITAL OUTPT CLINIC VISIT: HCPCS

## 2025-06-30 PROCEDURE — 92134 CPTRZ OPH DX IMG PST SGM RTA: CPT

## 2025-06-30 PROCEDURE — 99203 OFFICE O/P NEW LOW 30 MIN: CPT

## 2025-06-30 PROCEDURE — 2022F DILAT RTA XM EVC RTNOPTHY: CPT

## 2025-06-30 PROCEDURE — 92250 FUNDUS PHOTOGRAPHY W/I&R: CPT

## 2025-06-30 PROCEDURE — 99207 FUNDUS PHOTOS OU (BOTH EYES): CPT | Mod: 26

## 2025-06-30 ASSESSMENT — CONF VISUAL FIELD
OS_NORMAL: 1
OS_SUPERIOR_TEMPORAL_RESTRICTION: 0
OD_SUPERIOR_NASAL_RESTRICTION: 0
OD_SUPERIOR_TEMPORAL_RESTRICTION: 0
OD_INFERIOR_NASAL_RESTRICTION: 3
OS_SUPERIOR_NASAL_RESTRICTION: 0
OD_INFERIOR_TEMPORAL_RESTRICTION: 0
OS_INFERIOR_NASAL_RESTRICTION: 0
OS_INFERIOR_TEMPORAL_RESTRICTION: 0

## 2025-06-30 ASSESSMENT — SLIT LAMP EXAM - LIDS: COMMENTS: NORMAL

## 2025-06-30 ASSESSMENT — TONOMETRY
OD_IOP_MMHG: 19
OS_IOP_MMHG: 21
IOP_METHOD: TONOPEN

## 2025-06-30 ASSESSMENT — REFRACTION_MANIFEST
OS_ADD: +2.75
OS_AXIS: 150
OD_CYLINDER: +0.25
OS_CYLINDER: +0.25
OD_SPHERE: -0.50
OD_ADD: +2.75
OD_AXIS: 140
OS_SPHERE: +0.25

## 2025-06-30 ASSESSMENT — EXTERNAL EXAM - LEFT EYE: OS_EXAM: NORMAL

## 2025-06-30 ASSESSMENT — VISUAL ACUITY
OD_PH_SC: 20/20
OD_SC+: -1
OD_PH_SC+: -2
OS_SC: 20/40
OS_SC+: +1
METHOD: SNELLEN - LINEAR
OD_SC: 20/30

## 2025-06-30 ASSESSMENT — EXTERNAL EXAM - RIGHT EYE: OD_EXAM: NORMAL

## 2025-06-30 NOTE — PROGRESS NOTES
CC: Diabetic retinopathy exam     HPI: blurred vision left eye     PMHx:   DM type I s/p pancrease transplant   HTN   DL       Imaging:  OCT: 06/30/2025  Right eye: good foveal contour, no IRF/SRF   Left eye: blunted foveal contour, no IRF/SRF     Optos: 06/30/2025  C/w clinical exam     Assessment/ Plan: 06/30/2025       # DM type I s/p pancrease transplant  # proliferative diabetic retinopathy both eyes   S/p PRP both eyes    Oct shows no diabetic macular edema both eyes   Recommend yearly follows up     # Pseudophakia both eyes  Trace PCO   Observe     # Hx of Involutional ptosis, acquired, left    S/p CESAR MMCR 3/15/2019       Denis Nieto MD     [facility]  [facility]     Attending Physician Attestation:  Complete documentation of historical and exam elements from today's encounter can be found in the full encounter summary report (not reduplicated in this progress note).  I personally obtained the chief complaint(s) and history of present illness.  I confirmed and edited as necessary the review of systems, past medical/surgical history, family history, social history, and examination findings as documented by others; and I examined the patient myself.  I personally reviewed the relevant tests, images, and reports as documented above.  I formulated and edited as necessary the assessment and plan and discussed the findings and management plan with the patient and family. Denis Nieto MD

## 2025-07-15 ENCOUNTER — TELEPHONE (OUTPATIENT)
Dept: NEPHROLOGY | Facility: CLINIC | Age: 65
End: 2025-07-15
Payer: MEDICARE

## 2025-07-15 NOTE — TELEPHONE ENCOUNTER
Spoke with the patient to reschedule 9/12 appt with Crystal Shannon, pt requested a MyChart with rescheduling info, and agreed to cancel appt on 10/10. Pt will call back to reschedule next avail with Crystal Shannon with a lab prior

## 2025-08-12 DIAGNOSIS — Z94.0 KIDNEY REPLACED BY TRANSPLANT: Primary | ICD-10-CM

## 2025-08-12 DIAGNOSIS — B35.3 TINEA PEDIS OF BOTH FEET: ICD-10-CM

## 2025-08-12 DIAGNOSIS — Z48.298 AFTERCARE FOLLOWING ORGAN TRANSPLANT: ICD-10-CM

## 2025-08-12 DIAGNOSIS — Z94.83 PANCREAS REPLACED BY TRANSPLANT (H): ICD-10-CM

## 2025-08-12 RX ORDER — MYCOPHENOLATE MOFETIL 500 MG/1
TABLET ORAL
Qty: 60 TABLET | Refills: 0 | Status: SHIPPED | OUTPATIENT
Start: 2025-08-12 | End: 2025-08-13 | Stop reason: DRUGHIGH

## 2025-08-12 RX ORDER — TACROLIMUS 0.5 MG/1
0.5 CAPSULE ORAL 2 TIMES DAILY
Qty: 60 CAPSULE | Refills: 0 | Status: SHIPPED | OUTPATIENT
Start: 2025-08-12

## 2025-08-12 RX ORDER — TACROLIMUS 1 MG/1
1 CAPSULE ORAL 2 TIMES DAILY
Qty: 60 CAPSULE | Refills: 0 | Status: SHIPPED | OUTPATIENT
Start: 2025-08-12

## 2025-08-13 DIAGNOSIS — Z94.0 KIDNEY REPLACED BY TRANSPLANT: ICD-10-CM

## 2025-08-13 DIAGNOSIS — Z94.83 PANCREAS REPLACED BY TRANSPLANT (H): ICD-10-CM

## 2025-08-13 DIAGNOSIS — Z48.298 AFTERCARE FOLLOWING ORGAN TRANSPLANT: ICD-10-CM

## 2025-08-13 RX ORDER — MYCOPHENOLATE MOFETIL 500 MG/1
500 TABLET ORAL 2 TIMES DAILY
Qty: 60 TABLET | Refills: 3 | Status: SHIPPED | OUTPATIENT
Start: 2025-08-13
